# Patient Record
Sex: FEMALE | Race: WHITE | Employment: FULL TIME | URBAN - METROPOLITAN AREA
[De-identification: names, ages, dates, MRNs, and addresses within clinical notes are randomized per-mention and may not be internally consistent; named-entity substitution may affect disease eponyms.]

---

## 2021-12-01 DIAGNOSIS — M79.671 RIGHT FOOT PAIN: Primary | ICD-10-CM

## 2021-12-02 ENCOUNTER — OFFICE VISIT (OUTPATIENT)
Dept: ORTHOPEDIC SURGERY | Age: 46
End: 2021-12-02
Payer: COMMERCIAL

## 2021-12-02 VITALS — WEIGHT: 250 LBS | HEIGHT: 65 IN | BODY MASS INDEX: 41.65 KG/M2 | RESPIRATION RATE: 16 BRPM

## 2021-12-02 DIAGNOSIS — M19.079 ARTHRITIS OF MIDFOOT: ICD-10-CM

## 2021-12-02 DIAGNOSIS — M76.821 POSTERIOR TIBIAL TENDINITIS OF RIGHT LOWER EXTREMITY: Primary | ICD-10-CM

## 2021-12-02 DIAGNOSIS — M19.079 ARTHRITIS OF SUBTALAR JOINT: ICD-10-CM

## 2021-12-02 DIAGNOSIS — M24.573 EQUINUS CONTRACTURE OF ANKLE: ICD-10-CM

## 2021-12-02 DIAGNOSIS — M72.2 PLANTAR FASCIITIS: ICD-10-CM

## 2021-12-02 PROCEDURE — 99204 OFFICE O/P NEW MOD 45 MIN: CPT | Performed by: ORTHOPAEDIC SURGERY

## 2021-12-02 RX ORDER — NAPROXEN 500 MG/1
500 TABLET ORAL 2 TIMES DAILY PRN
Qty: 60 TABLET | Refills: 0 | Status: ON HOLD
Start: 2021-12-02 | End: 2022-03-18 | Stop reason: HOSPADM

## 2021-12-02 NOTE — PROGRESS NOTES
[facility] [address] AND SPORTS MEDICINE  [place] [address]  Dept: [phone]    Ambulatory Orthopedic Consult      CHIEF COMPLAINT:    Chief Complaint   Patient presents with    Foot Pain     Right       HISTORY OF PRESENT ILLNESS:      The patient is a 42 y.o. female who is being seen for evaluation of the above, which began around 7/2/21 atraumatically. At today's visit, she is using no brace/assistive device. History is obtained today from:   [x]  the patient     [x]  EMR     []  one family member/friend    []  multiple family members/friends    []  other:      She reports that she has previously seen a podiatrist for this problem Lissette Bowers). She localizes her pain to her right plantar heel and medial hindfoot. REVIEW OF SYSTEMS:  Musculoskeletal: See HPI for pertinent positives     Past Medical History:    She  has no past medical history on file. Past Surgical History:    She  has no past surgical history on file. Current Medications:   No current outpatient medications on file. Allergies:    Patient has no known allergies. Family History:  family history is not on file. Social History:   Social History     Occupational History    Not on file   Tobacco Use    Smoking status: Current Every Day Smoker    Smokeless tobacco: Never Used   Substance and Sexual Activity    Alcohol use: Not on file    Drug use: Not on file    Sexual activity: Not on file     Customer service full-time    OBJECTIVE:  Resp 16   Ht 5' 5\" (1.651 m)   Wt 250 lb (113.4 kg)   BMI 41.60 kg/m²    Psych: awake, alert  Cardio:  well perfused extremities, no cyanosis  Resp:  normal respiratory effort  Musculoskeletal:    RLE:  Vascular: Limb well perfused, compartments soft/compressible. Skin: No erythema/ulcers. Intact.    Neurovascular Status:  Grossly neurovascularly intact throughout   Tenderness to Palpation: Plantar heel, posterior tibial tendon greater than sinus Tarsi  -Pain with resisted inversion  -Pes planus, equinus      LLE:  Vascular: Limb well perfused, compartments soft/compressible. Skin: No erythema/ulcers. Intact. Neurovascular Status:  Grossly neurovascularly intact throughout   Tenderness to Palpation:   -Pes planus, equinus      RADIOLOGY:   12/2/2021 FINDINGS:  Three weightbearing views (AP, Mortise, and Lateral) of the right ankle and three weightbearing views (AP, Oblique, Lateral) of the right foot were obtained in the office today and reviewed, revealing no acute fracture, dislocation, or radioopaque foreign body/tumor. The ankle mortise is maintained with no widening of the clear spaces. Meary's angle is apex plantar, with widened talocalcaneal angle and talar head uncoverage. Degenerative changes diffusely throughout the midfoot, as well as at the subtalar joint, with joint space narrowing, sclerosis, and osteophytes. IMPRESSION: No acute fracture/dislocation. Pes planus. Electronically signed by Marcelino Ling MD    Relevant previous imaging reviewed, both imaging and report(s) as below:    No results found. ASSESSMENT AND PLAN:  Body mass index is 41.6 kg/m². She has right foot pain, secondary to posterior tibial tendinitis with pes planus and an equinus contracture, as well as a component of plantar fasciitis, along with degenerative changes of the subtalar joint and diffusely throughout the midfoot. Notably, she has the past medical history as above. She has a history of fibromyalgia, and tobacco use (reports she smokes 1/4 pack of cigarettes per day). We had a discussion today about the likely diagnosis and its natural history, physical exam and imaging findings, as well as various treatment options in detail. Surgically, we discussed a possible future flatfoot reconstruction, depending on the patient's clinical course.  At today's visit, we did decide to proceed with conservative management. Prior to being seen here today, she did receive an injection of her plantar heel (she reports that this helped for 2 days), and was in a cam boot, and doing physical therapy. She reports that she has not been able to work for the past 2 to 3 months, due to the pain. We did discuss her tobacco use in the context of a possible future surgery, I recommended tobacco cessation, and verbal understanding was expressed. Orders/referrals were placed as below at today's visit. The patient was provided information on an over the counter flatfoot style orthotic, including how to obtain it. I referred the patient to physical therapy for my flatfoot protocol as well as for her plantar fasciitis. At today's visit, she was ordered oral NSAIDs as below to be used as needed, and we discussed the appropriate risks. The patient was provided teaching material on this today, and will avoid using multiple NSAIDs at the same time. The patient was also provided a night splint, which she will use at night when sleeping for the next 12 weeks. All questions were answered and the above plan was agreed upon. The patient will return to clinic in 3 months PRN without x-rays. At her next visit, depending how she is doing, may consider a right subtalar joint injection and/or an [address], as well as topical anesthetics.             At the patient's next visit, depending on how the patient is doing and/or new imaging/labs results, we may consider the following options:    []  Lace up ankle     []  CAM boot         []  removable wrist brace     []  PT:        []  Wean out immobilization         []  Adv activity      []  Footmind        []  Spenco       []  Custom Orthotic:               []  AZ brace                    []  Rocker Bottom      []  Night splint    []  Heel cups        []  Strap        []  Toe gizmos    []  Topl        []  NSAIDs         []  Mary        []  Ref:         []  Stress Xray    []  CT []  MRI  []  Inj:          []  Consider OR      []  Pick OR date    No follow-ups on file. No orders of the defined types were placed in this encounter. Orders Placed This Encounter   Procedures    Ambulatory referral to Physical Therapy     Referral Priority:   Routine     Referral Type:   Eval and Treat     Referral Reason:   Specialty Services Required     Requested Specialty:   Physical Therapy     Number of Visits Requested:   1    Ambulatory referral to Physical Therapy     Referral Priority:   Routine     Referral Type:   Eval and Treat     Referral Reason:   Specialty Services Required     Requested Specialty:   Physical Therapy     Number of Visits Requested:   1         Yeison Emmanuel MD  Orthopedic Surgery        Please excuse any typos/errors, as this note was created with the assistance of voice recognition software. While intending to generate a document that actually reflects the content of the visit, the document can still have some errors including those of syntax and sound-a-like substitutions which may escape proof reading. In such instances, actual meaning can be extrapolated by context.

## 2021-12-02 NOTE — LETTER
[address] [place] [address] and Sports Medicine  [address] [address]  Phone: [phone]  Fax: [phone]    Michael Montiel MD    December 2, 2021     CAYETANO Ferrer - CNP  [facility] [address] [address]    Patient: Aylin Bueno   MR Number: [id number]   YOB: 1975   Date of Visit: 12/2/2021       Dear Araseli Valentin: Thank you for referring Aylin Bueno to me for evaluation/treatment. Below are the relevant portions of my assessment and plan of care. She has right foot pain, secondary to posterior tibial tendinitis with pes planus and an equinus contracture, as well as a component of plantar fasciitis, along with degenerative changes of the subtalar joint and diffusely throughout the midfoot. Notably, she has the past medical history as above. She has a history of fibromyalgia, and tobacco use (reports she smokes 1/4 pack of cigarettes per day). We had a discussion today about the likely diagnosis and its natural history, physical exam and imaging findings, as well as various treatment options in detail. Surgically, we discussed a possible future flatfoot reconstruction, depending on the patient's clinical course. At today's visit, we did decide to proceed with conservative management. Prior to being seen here today, she did receive an injection of her plantar heel (she reports that this helped for 2 days), and was in a cam boot, and doing physical therapy. She reports that she has not been able to work for the past 2 to 3 months, due to the pain. We did discuss her tobacco use in the context of a possible future surgery, I recommended tobacco cessation, and verbal understanding was expressed. Orders/referrals were placed as below at today's visit. The patient was provided information on an over the counter flatfoot style orthotic, including how to obtain it.  I referred the patient to

## 2021-12-13 ENCOUNTER — HOSPITAL ENCOUNTER (OUTPATIENT)
Dept: PHYSICAL THERAPY | Facility: CLINIC | Age: 46
Setting detail: THERAPIES SERIES
Discharge: HOME | End: 2021-12-13
Payer: COMMERCIAL

## 2021-12-13 PROCEDURE — 97161 PT EVAL LOW COMPLEX 20 MIN: CPT

## 2021-12-13 PROCEDURE — 97110 THERAPEUTIC EXERCISES: CPT

## 2021-12-13 NOTE — CONSULTS
[] [facility]) - [facility]  [address]  [phone]  F: [phone] [] [address]  Kl\[facility] [place] [facility]\"" [address]   [address]  P: [phone]  F: [phone] [] [address] &  Therapy  [address] [facility]  P: [phone]  F: [phone] [x] [address]  P: [phone]  F: [phone] [] [address]  [facility]   [address]   [place]: [phone]  F: [phone]      Physical Therapy Lower Extremity Evaluation    Date:  2021  Patient: Sondra Sanchez  : 1975  MRN: [id number]  Physician: Jm Brennan MD   Insurance: Alveta Fairchild  Medical Diagnosis:   E22.528 (ICD-10-CM) - Posterior tibial tendinitis of right lower [id number] (ICD-10-CM) - Equinus contracture of ankle  M19.079 (ICD-10-CM) - Arthritis of midfoot  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 571   Onset date: 2021    Next 's appt. : 3/3/2022    Subjective:     CC: Right foot pain    HPI: The patient states that she has been experiencing right foot pain since 2021. 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 The patient describes her pain as sharp and is located over the medial calcaneal tubercle. Her pain will occasionally travel proximally to the knee along the gastroc musculature. She currently wears hoka shoes with motion controled shoe inserts (medial arch support). She is wearing night splints that seem to slightly help but makes her foot stiff in the morning. The patient had been previously been completing physical therapy at [facility] that consisted of hip strengthening, gastroc strengthening and STM.  He last physical therapy attempt had not helped her pain but had actually made is worse. The patient is anticipating a return to [facility] date on January 2nd. PMHx: [x] Unremarkable [] Diabetes [] HTN  [] Pacemaker   [] MI/Heart Problems [] Cancer [] Arthritis [] Other:              [] Refer to full medical chart  In EPIC        Comorbidities:   [] Obesity [] Dialysis  [] N/A   [] Asthma/COPD [] Dementia [] Other:   [] Stroke [] Sleep apnea [] Other:   [] Vascular disease [] Rheumatic disease [] Other:     Tests: [x] X-Ray: 12/1/2021 [] MRI:  [] Other:  Impression    No acute fracture/dislocation. Pes planus.       Electronically signed by Kimberly Singh MD       Medications: [x] Refer to full medical record [] None [] Other:  Allergies:      [x] Refer to full medical record [] None [] Other:    Function:    Patient lives with: In what type of home []  One story   [] Two story   [] Split level   Number of stairs to enter 2   With handrail on the []  Right to enter   [] Left to enter   Bathroom has a []  Tub only  [] Tub/shower combo   [] Walk in shower    []  Grab bars   Washing machine is on []  Main level   [] Second level   [] Basement   Employer [employer]    Job Status []  Normal duty   [] Light duty   [x] Off due to condition    []  Retired   [] Not employed   [] Disability  [] Other:  []  Return to work:    Work activities Customer Service        ADL/IADL Previous level of function Current level of function Modifications made  Who currently assists the patient with task   Bathing  [x] Independent  [] Assist [x] Independent  [] Assist     Dress/grooming [x] Independent  [] Assist [x] Independent  [] Assist     Transfer/mobility [x] Independent  [] Assist [x] Independent  [] Assist     Feeding [x] Independent  [] Assist [x] Independent  [] Assist     Toileting [x] Independent  [] Assist [x] Independent  [] Assist     Driving [x] Independent  [] Assist [x] Independent  [] Assist     Housekeeping [x] Independent  [] Assist [x] Independent  [] Assist Hurts if on her feet for too long     Grocery shop/meal prep [x] Independent  [] Assist [x] Independent  [] Assist       Gait Prior level of function Current level of function    [x] Independent  [] Assist [x] Independent  [] Assist   Device: [] Independent [] Independent    [] Straight Cane [] Quad cane [] Straight Cane [] Quad cane    [] Standard walker [] Rolling walker   [] 4 wheeled walker [] Standard walker [] Rolling walker   [] 4 wheeled walker    [] Wheelchair [] Wheelchair     Pain:  [x] Yes  [] No Location: Right foot Pain Rating: (0-10 scale) 4/10; 10/10 standing in the kitchen   Pain altered Tx:  [] Yes  [] No  Action:    Symptoms:  [] Improving [] Worsening [x] Same  Better:  [] AM    [] PM    [x] Sit    [] Rise/Sit    []Stand    [] Walk    [] Lying    [] Other:  Worse: [] AM    [] PM    [] Sit    [] Rise/Sit    [x]Stand    [x] Walk    [] Lying    [] Bend                      [] Valsalva    [] Other:  Sleep: [] OK    [x] Disturbed: wakes her up around 2 - 3 o'clock in the morning     Objective:     OBSERVATION No Deficit Deficit Not Tested Comments   Posture       Lordosis [] [] [x]    Lateral Shift [] [] [x]    Scoliosis [] [] [x]    Iliac Crest [] [] [x]    PSIS [] [] [x]    ASIS [] [] [x]    Genu Valgus [] [] []    Genu Varus [] [] []    Genu Recurvatum [] [] []    Pronation [] [x] [] Over pronation with standing; positive navicular drop    Supination [] [] [x]    Leg Length Discrp [] [] [x]    Slumped Sitting [] [] [x]    Palpation [] [x] [] TTP over the posterior tibialis    Sensation [x] [] []    Edema [x] [] []    Neurological [x] [] []    Patellar Mobility [] [] [x]    Patellar Orientation [] [] [x] Rearfoot varus bilaterally    Joint mobility [] [x] [] Mid foot hypermobility    Gait [] [x] [] Analysis: mild right antalgic gait, stiff knee gait             ROM  ° A/P STRENGTH TESTS (+/-) Left Right Not Tested    Left Right Left Right    []   Hip     Anterior Drawer    [] treatments)  1. ? Pain: Patient will decrease right foot pain to < or = to 2/10 with weight bearing activity   2. ? ROM: Patient will increase right ankle ROM to > or = to 10 degrees. 3. ? Strength: Patient will demonstrate good muscle coordination during toe yoga exercises. 4. ? Function: Patient will be able to tolerate > 1 hour of standing. 5. Patient to be independent with home exercise program as demonstrated by performance with correct form without cues. 6. Demonstrate Knowledge of fall prevention  LTG: (to be met in 16 treatments)  1. Patient will increase bilateral hip strength to > or = to 4+/5.  2. Patient will be able to work 1 work shift without increased right foot pain. 3. Patient will decrease LEFS score to < or = to 25% impairment. Patient goals: Decrease pain and walk better     Rehab Potential:  [] Good  [x] Fair  [] Poor  Fair due to poor response to past therapy      Suggested Professional Referral:  [x] No  [] Yes:  Barriers to Goal Achievement:  [x] No  [] Yes:  Domestic Concerns:  [x] No  [] Yes:    Pt. Education:  [x] Plans/Goals, Risks/Benefits discussed  [x] Home exercise program    Method of Education: [x] Verbal  [x] Demo  [x] Written    [facility]  URL: [email or web address]  Date: 12/13/2021  Prepared by: Luis Fernando Mad    Exercises  Seated Great Toe Extension - 2 x daily - 7 x weekly - 3 sets - 10 reps  Seated Lesser Toes Extension - 2 x daily - 7 x weekly - 3 sets - 10 reps  Toe Spreading - 1 x daily - 7 x weekly - 3 sets - 10 reps  Arch Lifting - 1 x daily - 7 x weekly - 3 sets - 10 reps      Comprehension of Education:  [x] Verbalizes understanding. [x] Demonstrates understanding. [] Needs Review. [x] Demonstrates/verbalizes understanding of HEP/Ed previously given.     Treatment Plan:  [x] Therapeutic Exercise   44082  [] Iontophoresis: 4 mg/mL Dexamethasone Sodium Phosphate  mAmin  82874   [x] Therapeutic Activity  27509 [x] Vasopneumatic cold with compression  [id number]    [] Gait Training   [phone] [] Ultrasound   [id number]   [x] Neuromuscular Re-education  [phone] [] Electrical Stimulation Unattended  45185   [x] Manual Therapy  54125 [] Electrical Stimulation Attended  [id number]   [x] Instruction in HEP  [] Lumbar/Cervical Traction  [id number]   [] Aquatic Therapy   [id number] [] Cold/hotpack    [] Massage   42511      [] Dry Needling, 1 or 2 muscles  80706   [] Biofeedback, first 15 minutes   92527  [] Biofeedback, additional 15 minutes   72529 [] Dry Needling, 3 or more muscles  27468     []  Medication allergies reviewed for use of    Dexamethasone Sodium Phosphate 4mg/ml     with iontophoresis treatments. Pt is not allergic. Frequency:  2 x/week for 16 visits        Todays Treatment:  Modalities:   Precautions:  Exercises:  Exercise Reps/ Time Weight/ Level Comments   Seated Great Toe Extension      Seated Lesser Toes Extension        Toe Spreading      Arch Lifting      Patient education 5 min  Educated the patient on the anatomy and physiology associated with their dysfunction, reviewed plan of care, HEP and answered all the patient's questions. HEP:   Seated Great Toe Extension - 2 x daily - 7 x weekly - 3 sets - 10 reps  Seated Lesser Toes Extension - 2 x daily - 7 x weekly - 3 sets - 10 reps  Toe Spreading - 1 x daily - 7 x weekly - 3 sets - 10 reps  Arch Lifting - 1 x daily - 7 x weekly - 3 sets - 10 reps    Other:    Specific Instructions for next treatment: Foot intrinsic training (Great toe extension, Lesser toe extension, Toe splaying, foot doming), Posterior tibialis resisted TB exercises, TC joint mobilizations, hip strengthening. Progress to standing / WB foot intrinsic exercises.         Evaluation Complexity:  History (Personal factors, comorbidities) [] 0 [] 1-2 [x] 3+   Exam (limitations, restrictions) [] 1-2 [] 3 [x] 4+   Clinical presentation (progression) [x] Stable [] Evolving  [] Unstable   Decision Making [x] Low [] Moderate [] High    [x] Low Complexity [] Moderate Complexity [] High Complexity       Treatment Charges: Mins Units   [] Evaluation       [x]  Low       []  Moderate       []  High 35 1   []  Modalities     [x]  Ther Exercise 15 1   []  Manual Therapy     []  Ther Activities     []  Aquatics     []  Vasocompression     []  Other       TOTAL TREATMENT TIME: 50    Time in: 4:00 pm    Time Out: 4:50 pm    Electronically signed by: Davis Becker PT        Physician Signature:________________________________Date:__________________  By signing above or cosigning this note, I have reviewed this plan of care and certify a need for medically necessary rehabilitation services.      *PLEASE SIGN ABOVE AND FAX BACK ALL PAGES*

## 2021-12-17 ENCOUNTER — HOSPITAL ENCOUNTER (OUTPATIENT)
Dept: PHYSICAL THERAPY | Facility: CLINIC | Age: 46
Setting detail: THERAPIES SERIES
Discharge: HOME | End: 2021-12-17
Payer: COMMERCIAL

## 2021-12-17 PROCEDURE — 97016 VASOPNEUMATIC DEVICE THERAPY: CPT

## 2021-12-17 PROCEDURE — 97110 THERAPEUTIC EXERCISES: CPT

## 2021-12-17 PROCEDURE — 97140 MANUAL THERAPY 1/> REGIONS: CPT

## 2021-12-17 NOTE — FLOWSHEET NOTE
[] [facility]) - [facility] &  Therapy  [address]  [phone]  F: [phone] [] [address]  [employer] [employer] [address]   [address]  P: [phone]  F: [phone] [] [address] &  Therapy  [address]  P: [phone]  F: [phone] [x] [address]  P: [phone]  F: [phone] [] [address]  [facility]   [address]   [place]: [phone]  F: [phone]      Physical Therapy Daily Treatment Note    Date:  2021  Patient Name:  Aylin Bueno    :  1975  MRN: [id number]  Physician: Michael Montiel MD    Insurance: [facility]  Medical Diagnosis:   L19.435 (ICD-10-CM) - Posterior tibial tendinitis of right lower borpxksegW92.573 (ICD-10-CM) - Equinus contracture of ankle  M19.079 (ICD-10-CM) - Arthritis of midfoot  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 571   Onset date: 2021               Next 's appt. : 3/3/2022  Visit# / total visits:      Cancels/No Shows: 0/2    Subjective:    Pain:  [x] Yes  [] No Location: Right ankle / foot Pain Rating: (0-10 scale) 5/10  Pain altered Tx:  [] No  [] Yes  Action:  Comments: Patient states that she has increased soreness this date after a busy weekend of being on her feet / activity. The soreness is located over the posterior calf region and directly over the medial calcaneal tubercle. The patient states that she feels that the orthotics have not helped and actually put more pressure on the heel.       Objective:  Modalities:   Precautions:  Exercises:  Exercise Reps/ Time Weight/ Level Comments               Seated       Seated Great Toe Extension 2 x 10       Seated Lesser Toes Extension    2 x 10       Toe Spreading 2 x 10       Arch Lifting 2 x 10       Ankle plantar flexion 2 x 10     Ankle dorsiflexion 2 x 10     Ankle inversion 2 x 10     Ankle eversion 2 x 10  2 sets out and up  2 sets out and down             HEP:   Seated Great Toe Extension - 2 x daily - 7 x weekly - 3 sets - 10 reps  Seated Lesser Toes Extension - 2 x daily - 7 x weekly - 3 sets - 10 reps  Toe Spreading - 1 x daily - 7 x weekly - 3 sets - 10 reps  Arch Lifting - 1 x daily - 7 x weekly - 3 sets - 10 reps  Long Sitting Ankle Plantar Flexion with Resistance - 1 x daily - 7 x weekly - 3 sets - 10 reps  Long Sitting Ankle Dorsiflexion with Anchored Resistance - 1 x daily - 7 x weekly - 3 sets - 10 reps  Ankle Inversion with Resistance - 1 x daily - 7 x weekly - 3 sets - 10 reps  Ankle Eversion with Resistance - 1 x daily - 7 x weekly - 3 sets - 10 reps        Treatment Charges: Mins Units   []  Modalities     [x]  Ther Exercise 20 2   [x]  Manual Therapy 20 1   []  Ther Activities     []  Aquatics     [x]  Vasocompression 15 1   []  Other     Total Treatment time 55 4       Assessment: [x] Progressing toward goals. Initiated treatment with STM over the right gastroc and posterior tibialis. Provided STM via nestor  with greatest restrictions noted over the achillis tendon and posterior tibialis muscle belly. Completed foot intrinsic muscle exercises with good technique but fatigued through the second set. Initiated 4 way ankle exercises with emphasis on muscular control through the entire ROM. Will progress next session as tolerated with stabilization exercises. [] No change. [] Other:  [x] Patient would continue to benefit from skilled physical therapy services in order to: The patient is a 41 y/o female that presents with right foot pain. The patient presents with impairments of increased pain, decreased foot control, decreased DF ROM and poor tolerance to work related activities.  The patient signs and symptoms may be consistent with plantar fasciitis and posterior tibials insufficiency. The patient will benefit from therapy services to address the above impairments  to return to previous level of     STG: (to be met in 8 treatments)  1. ? Pain: Patient will decrease right foot pain to < or = to 2/10 with weight bearing activity   2. ? ROM: Patient will increase right ankle ROM to > or = to 10 degrees. 3. ? Strength: Patient will demonstrate good muscle coordination during toe yoga exercises. 4. ? Function: Patient will be able to tolerate > 1 hour of standing. 5. Patient to be independent with home exercise program as demonstrated by performance with correct form without cues. 6. Demonstrate Knowledge of fall prevention  LTG: (to be met in 16 treatments)  1. Patient will increase bilateral hip strength to > or = to 4+/5.  2. Patient will be able to work 1 work shift without increased right foot pain. 3. Patient will decrease LEFS score to < or = to 25% impairment. Pt. Education:  [x] Yes  [] No  [x] Reviewed Prior HEP/Ed  Method of Education: [x] Verbal  [] Demo  [x] Written    Access Code: [email or web address]  URL: [email or web address]  Date: 12/17/2021  Prepared by: Win Johns    Exercises  Long Sitting Ankle Plantar Flexion with Resistance - 1 x daily - 7 x weekly - 3 sets - 10 reps  Long Sitting Ankle Dorsiflexion with Anchored Resistance - 1 x daily - 7 x weekly - 3 sets - 10 reps  Ankle Inversion with Resistance - 1 x daily - 7 x weekly - 3 sets - 10 reps  Ankle Eversion with Resistance - 1 x daily - 7 x weekly - 3 sets - 10 reps      Comprehension of Education:  [x] Verbalizes understanding. [x] Demonstrates understanding. [] Needs review. [x] Demonstrates/verbalizes HEP/Ed previously given. Plan: [x] Continue current frequency toward long and short term goals.     [x] Specific Instructions for subsequent treatments: Specific Instructions for next treatment: Foot intrinsic training (Great toe extension, Lesser toe extension, Toe splaying, foot doming), Posterior tibialis resisted TB exercises, TC joint mobilizations, hip strengthening. Progress to standing / WB foot intrinsic exercises. Time In: 9:30 am            Time Out: 10:32 am    Electronically signed by:   Akila Monique PT

## 2021-12-21 ENCOUNTER — HOSPITAL ENCOUNTER (OUTPATIENT)
Dept: PHYSICAL THERAPY | Facility: CLINIC | Age: 46
Setting detail: THERAPIES SERIES
Discharge: HOME | End: 2021-12-21
Payer: COMMERCIAL

## 2021-12-21 PROCEDURE — 97110 THERAPEUTIC EXERCISES: CPT

## 2021-12-21 PROCEDURE — 97016 VASOPNEUMATIC DEVICE THERAPY: CPT

## 2021-12-21 PROCEDURE — 97140 MANUAL THERAPY 1/> REGIONS: CPT

## 2021-12-21 NOTE — FLOWSHEET NOTE
[] [phone]  - [facility] TWELVEST[facility] &  Therapy  [address]  [phone]  F: [phone] [] [address]  [facility]   [address]  P: [phone]  F: [phone] [] [address] &  Therapy  [address]  P: [phone]  F: [phone] [x] [address]  P: [phone]  F: [phone] [] [address]  [facility]   [address]   [place]: [phone]  F: [phone]      Physical Therapy Daily Treatment Note    Date:  2021  Patient Name:  Rosanna Sahu    :  1975  MRN: [id number]  Physician: Pamela Teixeira MD    Insurance: [facility] [employer]  Medical Diagnosis:   S16.616 (ICD-10-CM) - Posterior tibial tendinitis of right lower endjxqfveL43.573 (ICD-10-CM) - Equinus contracture of ankle  M19.079 (ICD-10-CM) - Arthritis of midfoot  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 571   Onset date: 2021               Next 's appt. : 3/3/2022  Visit# / total visits: 3/16     Cancels/No Shows: 0/2    Subjective:    Pain:  [x] Yes  [] No Location: Right ankle / foot Pain Rating: (0-10 scale) 7/10  Pain altered Tx:  [] No  [] Yes  Action:  Comments: Patient states that she is experiencing increased pain this date. Reports that the most of her pain is in the top of the foot, states she was wearing her night brace which made her pain worse.       Objective:  Modalities:   Precautions:  Exercises:  Exercise Reps/ Time Weight/ Level Comments               Seated       Seated Great Toe Extension 2 x 10       Seated Lesser Toes Extension    2 x 10       Toe Spreading 2 x 10       Arch Lifting 2 x 10       Ankle plantar flexion 2 x 10     Ankle dorsiflexion 2 x 10     Ankle inversion 2 x 10     Ankle eversion 2 x 10  2 sets out and up  2 sets out and down    Bosu circles  2 x 10           Standing      Tandem stance on foam pad 3 x 30\"              HEP:   Seated Great Toe Extension - 2 x daily - 7 x weekly - 3 sets - 10 reps  Seated Lesser Toes Extension - 2 x daily - 7 x weekly - 3 sets - 10 reps  Toe Spreading - 1 x daily - 7 x weekly - 3 sets - 10 reps  Arch Lifting - 1 x daily - 7 x weekly - 3 sets - 10 reps  Long Sitting Ankle Plantar Flexion with Resistance - 1 x daily - 7 x weekly - 3 sets - 10 reps  Long Sitting Ankle Dorsiflexion with Anchored Resistance - 1 x daily - 7 x weekly - 3 sets - 10 reps  Ankle Inversion with Resistance - 1 x daily - 7 x weekly - 3 sets - 10 reps  Ankle Eversion with Resistance - 1 x daily - 7 x weekly - 3 sets - 10 reps        Treatment Charges: Mins Units   []  Modalities     [x]  Ther Exercise 25 2   [x]  Manual Therapy 20 1   []  Ther Activities     []  Aquatics     [x]  Vasocompression 10 1   []  Other     Total Treatment time 55 4       Assessment: [x] Progressing toward goals. Initiated treatment with STM over the right gastroc and posterior tibialis. Provided STM via nestor  with greatest restrictions noted over the achillis tendon and posterior tibialis muscle belly. Completed 4 way ankle exercises with emphasis on muscular control through the entire ROM. Initiated standing ankle / foot stabilization with tandem stance on foam pad without shoe support. Will consider taping to provide additional stability next treatment with exercise progressions. [] No change. [] Other:  [x] Patient would continue to benefit from skilled physical therapy services in order to: The patient is a 41 y/o female that presents with right foot pain. The patient presents with impairments of increased pain, decreased foot control, decreased DF ROM and poor tolerance to work related activities. The patient signs and symptoms may be consistent with plantar fasciitis and posterior tibials insufficiency.  The patient will benefit from therapy services to address the above impairments  to return to previous level of     STG: (to be met in 8 treatments)  1. ? Pain: Patient will decrease right foot pain to < or = to 2/10 with weight bearing activity   2. ? ROM: Patient will increase right ankle ROM to > or = to 10 degrees. 3. ? Strength: Patient will demonstrate good muscle coordination during toe yoga exercises. 4. ? Function: Patient will be able to tolerate > 1 hour of standing. 5. Patient to be independent with home exercise program as demonstrated by performance with correct form without cues. 6. Demonstrate Knowledge of fall prevention  LTG: (to be met in 16 treatments)  1. Patient will increase bilateral hip strength to > or = to 4+/5.  2. Patient will be able to work 1 work shift without increased right foot pain. 3. Patient will decrease LEFS score to < or = to 25% impairment. Pt. Education:  [x] Yes  [] No  [x] Reviewed Prior HEP/Ed  Method of Education: [x] Verbal  [] Demo  [x] Written    Access Code: [email or web address]  URL: [email or web address]  Date: 12/17/2021  Prepared by: Chino Serum    Exercises  Long Sitting Ankle Plantar Flexion with Resistance - 1 x daily - 7 x weekly - 3 sets - 10 reps  Long Sitting Ankle Dorsiflexion with Anchored Resistance - 1 x daily - 7 x weekly - 3 sets - 10 reps  Ankle Inversion with Resistance - 1 x daily - 7 x weekly - 3 sets - 10 reps  Ankle Eversion with Resistance - 1 x daily - 7 x weekly - 3 sets - 10 reps      Comprehension of Education:  [x] Verbalizes understanding. [x] Demonstrates understanding. [] Needs review. [x] Demonstrates/verbalizes HEP/Ed previously given. Plan: [x] Continue current frequency toward long and short term goals.     [x] Specific Instructions for subsequent treatments: Specific Instructions for next treatment: Foot intrinsic training (Great toe extension, Lesser toe extension, Toe splaying, foot doming), Posterior tibialis resisted TB exercises, TC joint mobilizations, hip strengthening. Progress to standing / WB foot intrinsic exercises. Time In: 2:00 pm            Time Out: 3:10 pm    Electronically signed by:   Misbah Lora PT

## 2021-12-23 ENCOUNTER — HOSPITAL ENCOUNTER (OUTPATIENT)
Dept: PHYSICAL THERAPY | Facility: CLINIC | Age: 46
Setting detail: THERAPIES SERIES
Discharge: HOME | End: 2021-12-23
Payer: COMMERCIAL

## 2021-12-23 PROCEDURE — 97110 THERAPEUTIC EXERCISES: CPT

## 2021-12-23 PROCEDURE — 97140 MANUAL THERAPY 1/> REGIONS: CPT

## 2021-12-23 NOTE — FLOWSHEET NOTE
Standing      Tandem stance on foam pad 3 x 30\"              HEP:   Seated Great Toe Extension - 2 x daily - 7 x weekly - 3 sets - 10 reps  Seated Lesser Toes Extension - 2 x daily - 7 x weekly - 3 sets - 10 reps  Toe Spreading - 1 x daily - 7 x weekly - 3 sets - 10 reps  Arch Lifting - 1 x daily - 7 x weekly - 3 sets - 10 reps  Long Sitting Ankle Plantar Flexion with Resistance - 1 x daily - 7 x weekly - 3 sets - 10 reps  Long Sitting Ankle Dorsiflexion with Anchored Resistance - 1 x daily - 7 x weekly - 3 sets - 10 reps  Ankle Inversion with Resistance - 1 x daily - 7 x weekly - 3 sets - 10 reps  Ankle Eversion with Resistance - 1 x daily - 7 x weekly - 3 sets - 10 reps    Other:   The patient was educated in the benefits of dry needling treatment and potential risks, including but not limited to drowsiness, vasovagal responses, pain / soreness, minor bruising or bleeding and pneumothorax. Following patient education of the risks and benefits, the patient has supplied both verbal and written consent of treatment. Prior to needle placement the patients chart was thoroughly reviewed examining for potential precautions and contraindications. After reviewing the patients chart and obtaining written consent to treat the patient was considered a good candidate for dry needling services. Dry needling was performed in conjunction with Manual Therapy to promote soft tissue extensibility, improve tissue quality, improve ROM, normalize joint biomechanics and reduce pain symptoms. No charge submitted for the time the needles were inserted.    Dry Needle Dosage    Body region  (Homeostatic region, Symptomatic region, Paraspinal region)  Size and Number of Needles Technique  (Static, Tenting, Pistoning)   Homeostatic: Calf, Fibular   Symptomatic: Medial / lateral gastroc head  Paraspinal: Size: 3\"  Number: 6 Mild tenting in the calf homeostatic point and medial gastroc head       Treatment Charges: Mins Units to 25% impairment. Pt. Education:  [x] Yes  [] No  [x] Reviewed Prior HEP/Ed  Method of Education: [x] Verbal  [] Demo  [x] Written    Access Code: [email or web address]  URL: [email or web address]  Date: 12/17/2021  Prepared by: Georgie Cifuentes    Exercises  Long Sitting Ankle Plantar Flexion with Resistance - 1 x daily - 7 x weekly - 3 sets - 10 reps  Long Sitting Ankle Dorsiflexion with Anchored Resistance - 1 x daily - 7 x weekly - 3 sets - 10 reps  Ankle Inversion with Resistance - 1 x daily - 7 x weekly - 3 sets - 10 reps  Ankle Eversion with Resistance - 1 x daily - 7 x weekly - 3 sets - 10 reps      Comprehension of Education:  [x] Verbalizes understanding. [x] Demonstrates understanding. [] Needs review. [x] Demonstrates/verbalizes HEP/Ed previously given. Plan: [x] Continue current frequency toward long and short term goals. [x] Specific Instructions for subsequent treatments: Specific Instructions for next treatment: Foot intrinsic training (Great toe extension, Lesser toe extension, Toe splaying, foot doming), Posterior tibialis resisted TB exercises, TC joint mobilizations, hip strengthening. Progress to standing / WB foot intrinsic exercises. Time In: 10:30 pm            Time Out: 11:40 pm    Electronically signed by:   Georgie Cifuentes, PT

## 2021-12-28 ENCOUNTER — OFFICE VISIT (OUTPATIENT)
Dept: ORTHOPEDIC SURGERY | Age: 46
End: 2021-12-28
Payer: COMMERCIAL

## 2021-12-28 VITALS — WEIGHT: 250 LBS | HEIGHT: 65 IN | BODY MASS INDEX: 41.65 KG/M2 | RESPIRATION RATE: 12 BRPM

## 2021-12-28 DIAGNOSIS — M19.079 ARTHRITIS OF MIDFOOT: ICD-10-CM

## 2021-12-28 DIAGNOSIS — M24.573 EQUINUS CONTRACTURE OF ANKLE: ICD-10-CM

## 2021-12-28 DIAGNOSIS — M72.2 PLANTAR FASCIITIS: ICD-10-CM

## 2021-12-28 DIAGNOSIS — M19.079 ARTHRITIS OF SUBTALAR JOINT: ICD-10-CM

## 2021-12-28 DIAGNOSIS — M76.821 POSTERIOR TIBIAL TENDINITIS OF RIGHT LOWER EXTREMITY: Primary | ICD-10-CM

## 2021-12-28 PROCEDURE — 99212 OFFICE O/P EST SF 10 MIN: CPT | Performed by: ORTHOPAEDIC SURGERY

## 2021-12-28 PROCEDURE — 20605 DRAIN/INJ JOINT/BURSA W/O US: CPT | Performed by: ORTHOPAEDIC SURGERY

## 2021-12-28 RX ORDER — TRIAMCINOLONE ACETONIDE 40 MG/ML
40 INJECTION, SUSPENSION INTRA-ARTICULAR; INTRAMUSCULAR ONCE
Status: COMPLETED | OUTPATIENT
Start: 2021-12-28 | End: 2021-12-28

## 2021-12-28 RX ORDER — LIDOCAINE HYDROCHLORIDE 10 MG/ML
2 INJECTION, SOLUTION INFILTRATION; PERINEURAL ONCE
Status: COMPLETED | OUTPATIENT
Start: 2021-12-28 | End: 2021-12-28

## 2021-12-28 RX ADMIN — TRIAMCINOLONE ACETONIDE 40 MG: 40 INJECTION, SUSPENSION INTRA-ARTICULAR; INTRAMUSCULAR at 13:39

## 2021-12-28 RX ADMIN — LIDOCAINE HYDROCHLORIDE 2 ML: 10 INJECTION, SOLUTION INFILTRATION; PERINEURAL at 13:39

## 2021-12-28 NOTE — PROGRESS NOTES
 Drug use: Not on file    Sexual activity: Not on file     Customer service full-time    OBJECTIVE:  Resp 12   Ht 5' 5\" (1.651 m)   Wt 250 lb (113.4 kg)   BMI 41.60 kg/m²    Psych: awake, alert  Cardio:  well perfused extremities, no cyanosis  Resp:  normal respiratory effort  Musculoskeletal:    RLE:  Vascular: Limb well perfused, compartments soft/compressible. Skin: No erythema/ulcers. Intact. Neurovascular Status:  Grossly neurovascularly intact throughout   Tenderness to Palpation: Plantar heel, posterior tibial tendon greater than sinus Tarsi  -Pain with resisted inversion  -Pes planus, equinus      LLE:  Vascular: Limb well perfused, compartments soft/compressible. Skin: No erythema/ulcers. Intact. Neurovascular Status:  Grossly neurovascularly intact throughout   Tenderness to Palpation:   -Pes planus, equinus      RADIOLOGY:   12/28/2021 No new radiology images today. Prior images reviewed for reference. FINDINGS:  Three weightbearing views (AP, Mortise, and Lateral) of the right ankle and three weightbearing views (AP, Oblique, Lateral) of the right foot were obtained in the office today and reviewed, revealing no acute fracture, dislocation, or radioopaque foreign body/tumor. The ankle mortise is maintained with no widening of the clear spaces. Meary's angle is apex plantar, with widened talocalcaneal angle and talar head uncoverage. Degenerative changes diffusely throughout the midfoot, as well as at the subtalar joint, with joint space narrowing, sclerosis, and osteophytes. IMPRESSION: No acute fracture/dislocation. Pes planus. Electronically signed by Isael Herrera MD    Relevant previous imaging reviewed, both imaging and report(s) as below:    No results found. ASSESSMENT AND PLAN:  Body mass index is 41.6 kg/m².        She has right foot pain, secondary to posterior tibial tendinitis with pes planus and an equinus contracture, as well as a component of plantar fasciitis, along with degenerative changes of the subtalar joint and diffusely throughout the midfoot. Notably, she has the past medical history as above. She has a history of fibromyalgia, and tobacco use (reports she smokes 1/4 pack of cigarettes per day). We had a discussion today about the likely diagnosis and its natural history, physical exam and imaging findings, as well as various treatment options in detail. Surgically, we have discussed a possible future flatfoot reconstruction, depending on the patient's clinical course. We have decided to continue with conservative management. Prior to being seen here today, she did receive an injection of her plantar heel (she reports that this helped for 2 days), and was in a cam boot, and doing physical therapy. She reports that she has not been able to work for the past 2 to 3 months, due to the pain. We did discuss her tobacco use in the context of a possible future surgery, I recommended tobacco cessation, and verbal understanding was expressed. Orders/referrals were placed as below at today's visit. She will continue to use the over-the-counter flatfoot style orthotic, continue to use her night splint, and continue physical therapy for her posterior tibial tendinitis as well as for her plantar fasciitis. She may continue to take her oral NSAIDs as needed. We discussed an [place] brace, however she is not interested in pursuing this at this time.    -After discussing her treatment options, she wished to proceed with a right subtalar joint injection as below    All questions were answered and the above plan was agreed upon. The patient will return to clinic in the future as needed without x-rays. At her next visit, depending on how she is doing, we may consider an [place] brace, topical anesthetics and/or repeat right subtalar joint injection.           SUBTALAR INJECTION PROCEDURE NOTE: After discussing the risks/benefits/alternatives to injection, an informed consent was obtained. The right subtalar joint was verified as the correct location and allergies were reviewed. The skin overlying the injection site was cleaned with an alcohol swab followed by a local sterile prep. A 25 gauge needle was introduced into the above location under sterile conditions. A mixture of 40 mg of Kenalog and 2 mL of 1% Lidocaine without epinephrine was injected. The patient was noted to tolerate the procedure well without immediate complication. A dressing was applied and verbal instruction/education was provided. At the patient's next visit, depending on how the patient is doing and/or new imaging/labs results, we may consider the following options:    []  Lace up ankle     []  CAM boot         []  removable wrist brace     []  PT:        []  Wean out immobilization         []  Adv activity      []  Footmind        []  Spenco       []  Custom Orthotic:               []  AZ brace                    []  Rocker Bottom      []  Night splint    []  Heel cups        []  Strap        []  Toe gizmos    []  Topl        []  NSAIDs         []  Mary        []  Ref:         []  Stress Xray    []  CT        []  MRI  []  Inj:          []  Consider OR      []  Pick OR date    No follow-ups on file. No orders of the defined types were placed in this encounter. No orders of the defined types were placed in this encounter. Norris Kerr MD  Orthopedic Surgery        Please excuse any typos/errors, as this note was created with the assistance of voice recognition software. While intending to generate a document that actually reflects the content of the visit, the document can still have some errors including those of syntax and sound-a-like substitutions which may escape proof reading. In such instances, actual meaning can be extrapolated by context.

## 2021-12-29 ENCOUNTER — HOSPITAL ENCOUNTER (OUTPATIENT)
Dept: PHYSICAL THERAPY | Facility: CLINIC | Age: 46
Setting detail: THERAPIES SERIES
Discharge: HOME | End: 2021-12-29
Payer: COMMERCIAL

## 2021-12-29 PROCEDURE — 97140 MANUAL THERAPY 1/> REGIONS: CPT

## 2021-12-29 PROCEDURE — 97110 THERAPEUTIC EXERCISES: CPT

## 2021-12-29 NOTE — FLOWSHEET NOTE
[] [facility]) - [facility] &  Therapy  [address]  [phone]  F: [phone] [] [address]  [address]   [address]  P: [phone]  F: [phone] [] [address] &  Therapy  [address] [facility]  P: [phone]  F: [phone] [x] [address]  P: [phone]  F: [phone] [] [address]  [facility]   [address]   [place]: [phone]  F: [phone]      Physical Therapy Daily Treatment Note    Date:  2021  Patient Name:  Jesse Cooks    :  1975  MRN: [id number]  Physician: Suzie Valdes MD    Insurance: [employer]  Medical Diagnosis:   E92.690 (ICD-10-CM) - Posterior tibial tendinitis of right lower ewmxtfsqlV22.573 (ICD-10-CM) - Equinus contracture of ankle  M19.079 (ICD-10-CM) - Arthritis of midfoot  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 571   Onset date: 2021               Next 's appt. : 3/3/2022  Visit# / total visits:      Cancels/No Shows: 0/2    Subjective:    Pain:  [x] Yes  [] No Location: Right ankle / foot Pain Rating: (0-10 scale) 0/10  Pain altered Tx:  [] No  [] Yes  Action:  Comments: Patient states that she felt relief after IDN last visit. The patient had received an injection into the TC joint which has provided her much relief. Patient reports that Dr. Amanda Sutton will not sign off on her short term disability and will be seeing podiatric associates today to discuss short term disability. Reports [employer] will not allow her to return to work with limitations.       Objective:  Modalities:   Precautions:  Exercises:  Exercise Reps/ Time Weight/ Level Comments         Side lying       Clamshell 2 x 10 lime    Hip abduction 2 x 10                 Seated       Seated Great Toe Extension 2 x 10       Seated Lesser Toes Extension    2 x 10       Toe Spreading 2 x 10       Arch Lifting 2 x 10       Ankle plantar flexion 2 x 15 Blue    Ankle dorsiflexion 2 x 15 Blue    Ankle inversion 2 x 15 Blue    Ankle eversion 2 x 15 Blue 2 sets out and up  2 sets out and down    Baps circles 2 x 10 L2          Standing      Tandem stance on foam pad 3 x 30\"     SLS 1 x 15\"  Pain   Heel raises  2 x 10  With ball between heels       HEP:   Long Sitting Ankle Plantar Flexion with Resistance - 1 x daily - 7 x weekly - 3 sets - 10 reps  Long Sitting Ankle Dorsiflexion with Anchored Resistance - 1 x daily - 7 x weekly - 3 sets - 10 reps  Ankle Inversion with Resistance - 1 x daily - 7 x weekly - 3 sets - 10 reps  Ankle Eversion with Resistance - 1 x daily - 7 x weekly - 3 sets - 10 reps  Clamshell with Resistance - 1 x daily - 7 x weekly - 3 sets - 10 reps  Sidelying Hip Abduction - 1 x daily - 7 x weekly - 3 sets - 10 reps    Other:  Dry needling was performed in conjunction with Manual Therapy to promote soft tissue extensibility, improve tissue quality, improve ROM, normalize joint biomechanics and reduce pain symptoms. No charge submitted for the time the needles were inserted. Dry Needle Dosage    Body region  (Homeostatic region, Symptomatic region, Paraspinal region)  Size and Number of Needles Technique  (Static, Tenting, Pistoning)   Homeostatic: Calf, Fibular, Saphenous    Symptomatic: Medial / lateral gastroc head  Paraspinal: Size: 3\"  Number: 7 Mild tenting in the calf homeostatic point and medial gastroc head, tenting in gastroc        Treatment Charges: Mins Units   [x]  Modalities: HP 10 -   [x]  Ther Exercise 25 2   [x]  Manual Therapy 15 1   []  Ther Activities     []  Aquatics     [x]  Vasocompression     [x]  Other: IDN 5 -   Total Treatment time 50 3       Assessment: [x] Progressing toward goals.  Due to patient positive response to IDN last treatment continued with IDN at the start of session with the addition of one homeostatic insertion (Saphenous); Followed IDn with manual via hypervolt. Continued with exercises as listed above with increased resistance during 4 way ankle exercises. Applied motion control taping of the subtalar joint into inversion to minimize subtalar eversion compensation. Completed all standing exercises with tape applied with decreased symptoms. Patient reports minor pain symptoms over site of the steroid injection. [] No change. [] Other:  [x] Patient would continue to benefit from skilled physical therapy services in order to: The patient is a 43 y/o female that presents with right foot pain. The patient presents with impairments of increased pain, decreased foot control, decreased DF ROM and poor tolerance to work related activities. The patient signs and symptoms may be consistent with plantar fasciitis and posterior tibials insufficiency. The patient will benefit from therapy services to address the above impairments  to return to previous level of     STG: (to be met in 8 treatments)  1. ? Pain: Patient will decrease right foot pain to < or = to 2/10 with weight bearing activity   2. ? ROM: Patient will increase right ankle ROM to > or = to 10 degrees. 3. ? Strength: Patient will demonstrate good muscle coordination during toe yoga exercises. 4. ? Function: Patient will be able to tolerate > 1 hour of standing. 5. Patient to be independent with home exercise program as demonstrated by performance with correct form without cues. 6. Demonstrate Knowledge of fall prevention  LTG: (to be met in 16 treatments)  1. Patient will increase bilateral hip strength to > or = to 4+/5.  2. Patient will be able to work 1 work shift without increased right foot pain. 3. Patient will decrease LEFS score to < or = to 25% impairment.        Pt. Education:  [x] Yes  [] No  [x] Reviewed Prior HEP/Ed  Method of Education: [x] Verbal  [] Demo  [x] Written    Access Code: [email or web address]  URL: [email or web address]  Date: 12/17/2021  Prepared by: Levi Mishra    Exercises  Long Sitting Ankle Plantar Flexion with Resistance - 1 x daily - 7 x weekly - 3 sets - 10 reps  Long Sitting Ankle Dorsiflexion with Anchored Resistance - 1 x daily - 7 x weekly - 3 sets - 10 reps  Ankle Inversion with Resistance - 1 x daily - 7 x weekly - 3 sets - 10 reps  Ankle Eversion with Resistance - 1 x daily - 7 x weekly - 3 sets - 10 reps    Access Code: [email or web address]  URL: [email or web address]  Date: 12/29/2021  Prepared by: Levi Mishra    Exercises  Clamshell with Resistance - 1 x daily - 7 x weekly - 3 sets - 10 reps  Sidelying Hip Abduction - 1 x daily - 7 x weekly - 3 sets - 10 reps        Comprehension of Education:  [x] Verbalizes understanding. [x] Demonstrates understanding. [] Needs review. [x] Demonstrates/verbalizes HEP/Ed previously given. Plan: [x] Continue current frequency toward long and short term goals. [x] Specific Instructions for subsequent treatments: Specific Instructions for next treatment: Foot intrinsic training (Great toe extension, Lesser toe extension, Toe splaying, foot doming), Posterior tibialis resisted TB exercises, TC joint mobilizations, hip strengthening. Progress to standing / WB foot intrinsic exercises. Time In: 9:30 pm            Time Out: 10:30 pm    Electronically signed by:   Levi Mishra PT

## 2021-12-30 ENCOUNTER — TELEPHONE (OUTPATIENT)
Dept: ORTHOPEDIC SURGERY | Age: 46
End: 2021-12-30

## 2021-12-30 NOTE — TELEPHONE ENCOUNTER
Spoke with patient regarding Tuesday's visit. Patient is not ready to go back to work and her work will not take restrictions. After she talked to her STD, if our office types up the restrictions as discussed, her short term disability will continue if her work cannot accommodate. On Tuesday we had discussed restrictions for 6 weeks to ease her back to working full time and to allow her to continue PT. I typed up a letter for seated work for the next 6 weeks then to progress activity at that time. Patient has a follow up appointment on 3/3/2022 where work status will be reevaluated with goal of RTW without restrictions.

## 2022-01-03 ENCOUNTER — APPOINTMENT (OUTPATIENT)
Dept: PHYSICAL THERAPY | Facility: CLINIC | Age: 47
End: 2022-01-03
Payer: COMMERCIAL

## 2022-01-05 ENCOUNTER — HOSPITAL ENCOUNTER (OUTPATIENT)
Dept: PHYSICAL THERAPY | Facility: CLINIC | Age: 47
Setting detail: THERAPIES SERIES
Discharge: HOME | End: 2022-01-05
Payer: COMMERCIAL

## 2022-01-05 PROCEDURE — 97140 MANUAL THERAPY 1/> REGIONS: CPT

## 2022-01-05 PROCEDURE — 97110 THERAPEUTIC EXERCISES: CPT

## 2022-01-05 NOTE — FLOWSHEET NOTE
[] [facility]) - [facility] &  Therapy  [address]  [phone]  F: [phone] [] [address]  [employer] [address]   [address]  P: [phone]  F: [phone] [] [address] &  Therapy  [address]  P: [phone]  F: [phone] [x] [address]  P: [phone]  F: [phone] [] [address]  [facility]   [address]   [place]: [phone]  F: [phone]      Physical Therapy Daily Treatment Note    Date:  2022  Patient Name:  Juline Ganser    :  1975  MRN: [id number]  Physician: Dorie Morales MD    Insurance: [employer]  Medical Diagnosis:   B65.813 (ICD-10-CM) - Posterior tibial tendinitis of right lower gbxtbjncoP79.573 (ICD-10-CM) - Equinus contracture of ankle  M19.079 (ICD-10-CM) - Arthritis of midfoot  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 571   Onset date: 2021               Next 's appt. : 3/3/2022  Visit# / total visits:      Cancels/No Shows: 0/2    Subjective:    Pain:  [x] Yes  [] No Location: Right ankle / foot Pain Rating: (0-10 scale) 5/10  Pain altered Tx:  [] No  [] Yes  Action:  Comments: Patient states that after last session she had relief of symptoms until Saturday (4 days). States that she is going to be on medical leave until March.       Objective:  Modalities:   Precautions:  Exercises:  Exercise Reps/ Time Weight/ Level Comments Completed           Side lying        Clamshell 2 x 15 lime  x   Hip abduction 2 x 15   x                 Seated        Seated Great Toe Extension 2 x 10     -   Seated Lesser Toes Extension    2 x 10     -   Toe Spreading 2 x 10     -   Arch Lifting 2 x 10     -   Ankle plantar flexion 2 x 15 Blue  -   Ankle dorsiflexion 2 x 15 Blue -   Ankle inversion 2 x 15 Blue  -   Ankle eversion 2 x 15 Blue 2 sets out and up  2 sets out and down  -   Baps circles 2 x 10 L2  -          Standing       Gastroc and soleus stretch 3 x 30\"  On wedge  x   Tandem stance on foam pad 3 x 30\"   x   SLS 2 x 30\"   x   Heel raises  2 x 10  With ball between heels and 3\" eccentric x   Lateral heel taps    Increased pain x   Star drill   Increased pain x                    HEP:   Long Sitting Ankle Plantar Flexion with Resistance - 1 x daily - 7 x weekly - 3 sets - 10 reps  Long Sitting Ankle Dorsiflexion with Anchored Resistance - 1 x daily - 7 x weekly - 3 sets - 10 reps  Ankle Inversion with Resistance - 1 x daily - 7 x weekly - 3 sets - 10 reps  Ankle Eversion with Resistance - 1 x daily - 7 x weekly - 3 sets - 10 reps  Clamshell with Resistance - 1 x daily - 7 x weekly - 3 sets - 20 reps  Sidelying Hip Abduction - 1 x daily - 7 x weekly - 3 sets - 10 reps  Single leg stance x 30 sec    Other:  Dry needling was performed in conjunction with Manual Therapy to promote soft tissue extensibility, improve tissue quality, improve ROM, normalize joint biomechanics and reduce pain symptoms. No charge submitted for the time the needles were inserted. Dry Needle Dosage    Body region  (Homeostatic region, Symptomatic region, Paraspinal region)  Size and Number of Needles Technique  (Static, Tenting, Pistoning)   Homeostatic: Calf, Fibular, Saphenous    Symptomatic: Medial / lateral gastroc head  Paraspinal: Size: 3\"  Number: 7 Mild tenting in the calf homeostatic point and medial gastroc head, tenting in gastroc        Treatment Charges: Mins Units   [x]  Modalities: HP     [x]  Ther Exercise 30 2   [x]  Manual Therapy 15 1   []  Ther Activities     []  Aquatics     [x]  Vasocompression     [x]  Other: IDN 5 -   Total Treatment time 50 3       Assessment: [x] Progressing toward goals.  Due to patient positive response to IDN last treatment continued with IDN at the start of 3. Patient will decrease LEFS score to < or = to 25% impairment. Pt. Education:  [x] Yes  [] No  [x] Reviewed Prior HEP/Ed  Method of Education: [x] Verbal  [] Demo  [x] Written    Access Code: [email or web address]  URL: [email or web address]  Date: 12/17/2021  Prepared by: Malia Foy    Exercises  Long Sitting Ankle Plantar Flexion with Resistance - 1 x daily - 7 x weekly - 3 sets - 10 reps  Long Sitting Ankle Dorsiflexion with Anchored Resistance - 1 x daily - 7 x weekly - 3 sets - 10 reps  Ankle Inversion with Resistance - 1 x daily - 7 x weekly - 3 sets - 10 reps  Ankle Eversion with Resistance - 1 x daily - 7 x weekly - 3 sets - 10 reps    Access Code: [email or web address]  URL: [email or web address]  Date: 12/29/2021  Prepared by: Malia Foy    Exercises  Clamshell with Resistance - 1 x daily - 7 x weekly - 3 sets - 10 reps  Sidelying Hip Abduction - 1 x daily - 7 x weekly - 3 sets - 10 reps        Comprehension of Education:  [x] Verbalizes understanding. [x] Demonstrates understanding. [] Needs review. [x] Demonstrates/verbalizes HEP/Ed previously given. Plan: [x] Continue current frequency toward long and short term goals. [x] Specific Instructions for subsequent treatments: Specific Instructions for next treatment: Foot intrinsic training (Great toe extension, Lesser toe extension, Toe splaying, foot doming), Posterior tibialis resisted TB exercises, TC joint mobilizations, hip strengthening. Progress to standing / WB foot intrinsic exercises. Time In: 9:30 pm            Time Out: 10:30 pm    Electronically signed by:   Malia Foy, PT

## 2022-01-07 ENCOUNTER — HOSPITAL ENCOUNTER (OUTPATIENT)
Dept: PHYSICAL THERAPY | Facility: CLINIC | Age: 47
Setting detail: THERAPIES SERIES
Discharge: HOME | End: 2022-01-07
Payer: COMMERCIAL

## 2022-01-07 PROCEDURE — 97016 VASOPNEUMATIC DEVICE THERAPY: CPT

## 2022-01-07 PROCEDURE — 97110 THERAPEUTIC EXERCISES: CPT

## 2022-01-07 NOTE — FLOWSHEET NOTE
[] [facility]) - [facility] &  Therapy  [address]  [phone]  F: [phone] [] [address]  [address]   [address]  P: [phone]  F: [phone] [] [address] &  Therapy  [address]  P: [phone]  F: [phone] [x] [address]  P: [phone]  F: [phone] [] [address]  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 571   Onset date: 2021               Next 's appt. : 3/3/2022  Visit# / total visits:      Cancels/No Shows: 0/2    Subjective:    Pain:  [x] Yes  [] No Location: Right ankle / foot Pain Rating: (0-10 scale) 5/10  Pain altered Tx:  [] No  [] Yes  Action:  Comments: Patient states that she did not have much relief from last session due to \"running around\". States she did have improvement in the fact that she was able to complete 2 hours of running before having symptoms.       Objective:  Modalities:   Precautions:  Exercises:  Exercise Reps/ Time Weight/ Level Comments Completed    Nu-Step 10' 5  x   Side lying        Clamshell 2 x 15 lime  x   Hip abduction 2 x 15   x                 Seated        Seated Great Toe Extension 2 x 10    standing x   Seated Lesser Toes Extension    2 x 10    standing x   Toe Spreading 2 x 10    standing x   Arch Lifting 2 x 10    standing w/ 5 sec hold x   Ankle plantar flexion 2 x 15 Purple  x   Ankle dorsiflexion 2 x 15 Purple  x   Ankle inversion 2 x 15 Purple  x   Ankle eversion 2 x 15 Purple 2 sets out and up  2 sets out and down  x   Baps circles 2 x 10 L2  -          Standing       Gastroc and soleus stretch 3 x 30\"  On wedge  x   Tandem stance on foam pad 3 x 30\"   x   SLS 2 x 30\"   x   Heel raises  2 x 15  With ball between heels and 3\" eccentric x   Lateral heel taps    Increased pain x   Star drill   Increased pain -                    HEP:   Long Sitting Ankle Plantar Flexion with Resistance - 1 x daily - 7 x weekly - 3 sets - 10 reps  Long Sitting Ankle Dorsiflexion with Anchored Resistance - 1 x daily - 7 x weekly - 3 sets - 10 reps  Ankle Inversion with Resistance - 1 x daily - 7 x weekly - 3 sets - 10 reps  Ankle Eversion with Resistance - 1 x daily - 7 x weekly - 3 sets - 10 reps  Clamshell with Resistance - 1 x daily - 7 x weekly - 3 sets - 20 reps  Sidelying Hip Abduction - 1 x daily - 7 x weekly - 3 sets - 10 reps  Single leg stance x 30 sec    Other:  Dry needling was performed in conjunction with Manual Therapy to promote soft tissue extensibility, improve tissue quality, improve ROM, normalize joint biomechanics and reduce pain symptoms. No charge submitted for the time the needles were inserted.    Dry Needle Dosage    Body region  (Homeostatic region, Symptomatic region, Paraspinal region)  Size and Number of Needles Technique  (Static, Tenting, Pistoning)   Homeostatic: Calf, Fibular, Saphenous    Symptomatic: Medial / lateral gastroc head  Paraspinal: Size: 3\"  Number: 7 Mild tenting in the calf homeostatic point and medial gastroc head, tenting in gastroc        Treatment Charges: Mins Units   [x]  Modalities: HP     [x]  Ther Exercise 40 3   [x]  Manual Therapy     []  Ther Activities     []  Aquatics     [x]  Vasocompression 15 1   [x]  Other: IDN     Total Treatment time 55 3 Assessment: [x] Progressing toward goals. Patient reports increased soreness this date therefore did not complete IDN or manual this date. Completed exercises as listed above with progression in foot intrinsic exercises into standing. Patient able to tolerate anterior and lateral step ups this date requiring mod verbal cues for hip flexion during lateral step downs; fair carryover. Will continue manual therapy as needed and progress foot intrinsic and LE strengthening. [] No change. [] Other:  [x] Patient would continue to benefit from skilled physical therapy services in order to: The patient is a 41 y/o female that presents with right foot pain. The patient presents with impairments of increased pain, decreased foot control, decreased DF ROM and poor tolerance to work related activities. The patient signs and symptoms may be consistent with plantar fasciitis and posterior tibials insufficiency. The patient will benefit from therapy services to address the above impairments  to return to previous level of     STG: (to be met in 8 treatments)  1. ? Pain: Patient will decrease right foot pain to < or = to 2/10 with weight bearing activity   2. ? ROM: Patient will increase right ankle ROM to > or = to 10 degrees. 3. ? Strength: Patient will demonstrate good muscle coordination during toe yoga exercises. 4. ? Function: Patient will be able to tolerate > 1 hour of standing. 5. Patient to be independent with home exercise program as demonstrated by performance with correct form without cues. 6. Demonstrate Knowledge of fall prevention  LTG: (to be met in 16 treatments)  1. Patient will increase bilateral hip strength to > or = to 4+/5.  2. Patient will be able to work 1 work shift without increased right foot pain. 3. Patient will decrease LEFS score to < or = to 25% impairment.        Pt. Education:  [x] Yes  [] No  [x] Reviewed Prior HEP/Ed  Method of Education: [x] Verbal  [] Demo  [x] Written    Access Code: [email or web address]  URL: [email or web address]  Date: 12/17/2021  Prepared by: Mei Marr    Exercises  Long Sitting Ankle Plantar Flexion with Resistance - 1 x daily - 7 x weekly - 3 sets - 10 reps  Long Sitting Ankle Dorsiflexion with Anchored Resistance - 1 x daily - 7 x weekly - 3 sets - 10 reps  Ankle Inversion with Resistance - 1 x daily - 7 x weekly - 3 sets - 10 reps  Ankle Eversion with Resistance - 1 x daily - 7 x weekly - 3 sets - 10 reps    Access Code: [email or web address]  URL: [email or web address]  Date: 12/29/2021  Prepared by: Mei Marr    Exercises  Clamshell with Resistance - 1 x daily - 7 x weekly - 3 sets - 10 reps  Sidelying Hip Abduction - 1 x daily - 7 x weekly - 3 sets - 10 reps        Comprehension of Education:  [x] Verbalizes understanding. [x] Demonstrates understanding. [] Needs review. [x] Demonstrates/verbalizes HEP/Ed previously given. Plan: [x] Continue current frequency toward long and short term goals. [x] Specific Instructions for subsequent treatments: Specific Instructions for next treatment: Foot intrinsic training (Great toe extension, Lesser toe extension, Toe splaying, foot doming), Posterior tibialis resisted TB exercises, TC joint mobilizations, hip strengthening. Progress to standing / WB foot intrinsic exercises. Time In: 1:55 pm            Time Out: 3:00 pm    Electronically signed by:   Mei Marr PT

## 2022-01-10 ENCOUNTER — HOSPITAL ENCOUNTER (OUTPATIENT)
Dept: PHYSICAL THERAPY | Facility: CLINIC | Age: 47
Setting detail: THERAPIES SERIES
Discharge: HOME | End: 2022-01-10
Payer: COMMERCIAL

## 2022-01-10 PROCEDURE — 97140 MANUAL THERAPY 1/> REGIONS: CPT

## 2022-01-10 PROCEDURE — 97110 THERAPEUTIC EXERCISES: CPT

## 2022-01-10 NOTE — FLOWSHEET NOTE
[] [facility]) - [facility] &  Therapy  [address]  [phone]  F: [phone] [] [address]  [facility]   [address]  P: [phone]  F: [phone] [] [address] &  Therapy  [address]  P: [phone]  F: [phone] [x] [address]  P: [phone]  F: [phone] [] [address]  [facility]   [address]   [place]: [phone]  F: [phone]      Physical Therapy Daily Treatment Note    Date:  1/10/2022  Patient Name:  Conor Herrera    :  1975  MRN: [id number]  Physician: Josue Hathaway MD    Insurance: [employer]  Medical Diagnosis:   L87.109 (ICD-10-CM) - Posterior tibial tendinitis of right lower gjfreoiavK57.573 (ICD-10-CM) - Equinus contracture of ankle  M19.079 (ICD-10-CM) - Arthritis of midfoot  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 571   Onset date: 2021               Next 's appt. : 3/3/2022  Visit# / total visits:      Cancels/No Shows: 0/2    Subjective:    Pain:  [x] Yes  [] No Location: Right ankle / foot Pain Rating: (0-10 scale) 5/10  Pain altered Tx:  [] No  [] Yes  Action:  Comments: Patient states that pain remains consistent. No changes in symptoms.      Objective:  Modalities:   Precautions:  Exercises:  Exercise Reps/ Time Weight/ Level Comments Completed    Nu-Step 10' 5  x   Side lying        Clamshell 2 x 15 lime     Hip abduction 2 x 15                    Seated        Seated Great Toe Extension 2 x 10    standing x   Seated Lesser Toes Extension    2 x 10    standing x   Toe Spreading 2 x 10    standing x   Arch Lifting 2 x 10    standing w/ 5 sec hold x   Ankle plantar flexion 2 x 15 Purple  -   Ankle dorsiflexion 2 x 15 Purple  -   Ankle inversion 2 x orthotic to minimize rearfoot valgus and over pronation. Patient able to complete exercise progressions of star drill and SLS on foam pad with new posterior post without increased pain symptoms. No changes to HEP this date in order to assess response to new post next session. [] No change. [] Other:  [x] Patient would continue to benefit from skilled physical therapy services in order to: The patient is a 43 y/o female that presents with right foot pain. The patient presents with impairments of increased pain, decreased foot control, decreased DF ROM and poor tolerance to work related activities. The patient signs and symptoms may be consistent with plantar fasciitis and posterior tibials insufficiency. The patient will benefit from therapy services to address the above impairments  to return to previous level of     STG: (to be met in 8 treatments)  1. ? Pain: Patient will decrease right foot pain to < or = to 2/10 with weight bearing activity   2. ? ROM: Patient will increase right ankle ROM to > or = to 10 degrees. 3. ? Strength: Patient will demonstrate good muscle coordination during toe yoga exercises. 4. ? Function: Patient will be able to tolerate > 1 hour of standing. 5. Patient to be independent with home exercise program as demonstrated by performance with correct form without cues. 6. Demonstrate Knowledge of fall prevention  LTG: (to be met in 16 treatments)  1. Patient will increase bilateral hip strength to > or = to 4+/5.  2. Patient will be able to work 1 work shift without increased right foot pain. 3. Patient will decrease LEFS score to < or = to 25% impairment. Pt. Education:  [x] Yes  [] No  [x] Reviewed Prior HEP/Ed  Method of Education: [x] Verbal  [] Demo  [x] Written    Access Code: [email or web address]  URL: [email or web address]  Date: 12/17/2021  Prepared by:  Shi Suárez    Exercises  Long Sitting Ankle Plantar Flexion with Resistance - 1 x daily - 7 x weekly - 3 sets - 10 reps  Long Sitting Ankle Dorsiflexion with Anchored Resistance - 1 x daily - 7 x weekly - 3 sets - 10 reps  Ankle Inversion with Resistance - 1 x daily - 7 x weekly - 3 sets - 10 reps  Ankle Eversion with Resistance - 1 x daily - 7 x weekly - 3 sets - 10 reps    Access Code: [email or web address]  URL: [email or web address]  Date: 12/29/2021  Prepared by: Brannon Velazquez    Exercises  Clamshell with Resistance - 1 x daily - 7 x weekly - 3 sets - 10 reps  Sidelying Hip Abduction - 1 x daily - 7 x weekly - 3 sets - 10 reps        Comprehension of Education:  [x] Verbalizes understanding. [x] Demonstrates understanding. [] Needs review. [x] Demonstrates/verbalizes HEP/Ed previously given. Plan: [x] Continue current frequency toward long and short term goals. [x] Specific Instructions for subsequent treatments: Specific Instructions for next treatment: Foot intrinsic training (Great toe extension, Lesser toe extension, Toe splaying, foot doming), Posterior tibialis resisted TB exercises, TC joint mobilizations, hip strengthening. Progress to standing / WB foot intrinsic exercises. Time In: 3:00 pm            Time Out: 3:57 pm    Electronically signed by:   Brannon Velazquez PT

## 2022-01-12 ENCOUNTER — HOSPITAL ENCOUNTER (OUTPATIENT)
Dept: PHYSICAL THERAPY | Facility: CLINIC | Age: 47
Setting detail: THERAPIES SERIES
Discharge: HOME | End: 2022-01-12
Payer: COMMERCIAL

## 2022-01-12 PROCEDURE — 97016 VASOPNEUMATIC DEVICE THERAPY: CPT

## 2022-01-12 PROCEDURE — 97110 THERAPEUTIC EXERCISES: CPT

## 2022-01-12 NOTE — FLOWSHEET NOTE
[] [facility]) - [facility] &  Therapy  [address]  [phone]  F: [phone] [] [address]  [place] [address]   [address]  P: [phone]  F: [phone] [] [address] &  Therapy  [address]  P: [phone]  F: [phone] [x] [address]  P: [phone]  F: [phone] [] [address]  [facility]   [address]   [place]: [phone]  F: [phone]      Physical Therapy Daily Treatment Note    Date:  2022  Patient Name:  Veleta Brittle    :  1975  MRN: [id number]  Physician: Keely Hannon MD    Insurance: [facility] Diagnosis:   N85.398 (ICD-10-CM) - Posterior tibial tendinitis of right lower hbymoxulyE83.573 (ICD-10-CM) - Equinus contracture of ankle  M19.079 (ICD-10-CM) - Arthritis of midfoot  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 571   Onset date: 2021               Next 's appt. : 3/3/2022  Visit# / total visits:      Cancels/No Shows: 0/2    Subjective:    Pain:  [x] Yes  [] No Location: Right ankle / foot Pain Rating: (0-10 scale) 6/10  Pain altered Tx:  [] No  [] Yes  Action:  Comments: Patient states that she is having increased pain this date. Reports that she did not do much walking, mostly relaxed during the day.      Objective:  Modalities:   Precautions:  Exercises:  Exercise Reps/ Time Weight/ Level Comments Completed    Nu-Step 10' 5  x          Supine       SLR 2 x 15  bilateral x          Prone       Hip extension 2 x 15  bilateral x          Side lying        Clamshell 2 x 15 lime  x   Hip abduction 2 x 15  bilateral x                 Seated        Great Toe Extension 2 x 15    standing x   Lesser Toes Extension    2 x 15    standing x   Toe Spreading 2 x 15    standing x   Arch Lifting 2 x 15    standing w/ 5 sec hold x   Ankle plantar flexion 2 x 15 Purple  -   Ankle dorsiflexion 2 x 15 Purple  -   Ankle inversion 2 x 15 Purple  -   Ankle eversion 2 x 15 Purple 2 sets out and up  2 sets out and down  -   Baps circles 2 x 10 L2  -          Standing       Gastroc and soleus stretch 3 x 30\"  On wedge  x   Tandem stance on foam pad 3 x 30\"   -   SLS 3 x 30\"  Foam pad  x   Heel raises  2 x 10  Up with two weight shift over to one x   Lateral heel taps    Increased pain -   Star drill 1 x 5   x   Rocker board 2 x 30\" L2  x             HEP:   Single Leg Stance on Foam Pad - 1 x daily - 7 x weekly - 3 sets - 1 reps - 30 sec hold  Standing Heel Raise with Support - 1 x daily - 7 x weekly - 2 sets - 10 reps  Arch Lifting - 1 x daily - 7 x weekly - 3 sets - 10 reps  Toe Spreading - 1 x daily - 7 x weekly - 3 sets - 10 reps  Seated Self Great Toe Stretch - 1 x daily - 7 x weekly - 3 sets - 1 reps - 30 sec hold      Other:  Dry needling was performed in conjunction with Manual Therapy to promote soft tissue extensibility, improve tissue quality, improve ROM, normalize joint biomechanics and reduce pain symptoms. No charge submitted for the time the needles were inserted. Dry Needle Dosage    Body region  (Homeostatic region, Symptomatic region, Paraspinal region)  Size and Number of Needles Technique  (Static, Tenting, Pistoning)   Homeostatic: Calf, Fibular, Saphenous    Symptomatic: Medial / lateral gastroc head  Paraspinal: Size: 3\"  Number: 7 Mild tenting in the calf homeostatic point and medial gastroc head, tenting in gastroc        Treatment Charges: Mins Units   []  Modalities:      [x]  Ther Exercise 45 3   []  Manual Therapy     []  Ther Activities     []  Aquatics     [x]  Vasocompression 15 1   []  Other: IDN     Total Treatment time 60 4       Assessment: [x] Progressing toward goals.  Do to minimal improvement in pain symptoms following IDN and manual the past two treatment sessions IDN and manual therapy was not completed. Treatment focused on LE strengthening and foot intrinsic strengthening. The patient demonstrates greatest difficulty with standing foot doming and toe splaying therefore continued these in the HEP. Added Resistance to heel raises by having the patient transfer weight to the affected LE during the eccentric phase. Will continue to progress LE strengthening and foot intrinsic strengthening as tolerated. Will complete a progress report next visit to document patient progress. [] No change. [] Other:  [x] Patient would continue to benefit from skilled physical therapy services in order to: The patient is a 43 y/o female that presents with right foot pain. The patient presents with impairments of increased pain, decreased foot control, decreased DF ROM and poor tolerance to work related activities. The patient signs and symptoms may be consistent with plantar fasciitis and posterior tibials insufficiency. The patient will benefit from therapy services to address the above impairments  to return to previous level of     STG: (to be met in 8 treatments)  1. ? Pain: Patient will decrease right foot pain to < or = to 2/10 with weight bearing activity   2. ? ROM: Patient will increase right ankle ROM to > or = to 10 degrees. 3. ? Strength: Patient will demonstrate good muscle coordination during toe yoga exercises. 4. ? Function: Patient will be able to tolerate > 1 hour of standing. 5. Patient to be independent with home exercise program as demonstrated by performance with correct form without cues. 6. Demonstrate Knowledge of fall prevention  LTG: (to be met in 16 treatments)  1. Patient will increase bilateral hip strength to > or = to 4+/5.  2. Patient will be able to work 1 work shift without increased right foot pain. 3. Patient will decrease LEFS score to < or = to 25% impairment.        Pt. Education:  [x] Yes  [] No  [x] Reviewed Prior HEP/Ed  Method of Education: [x] Verbal  [] Demo  [x] Written    Access Code: [email or web address]  URL: [email or web address]  Date: 01/12/2022  Prepared by: Darrell Hunter    Exercises  Single Leg Stance on Foam Pad - 1 x daily - 7 x weekly - 3 sets - 1 reps - 30 sec hold  Standing Heel Raise with Support - 1 x daily - 7 x weekly - 2 sets - 10 reps  Arch Lifting - 1 x daily - 7 x weekly - 3 sets - 10 reps  Toe Spreading - 1 x daily - 7 x weekly - 3 sets - 10 reps  Seated Self Great Toe Stretch - 1 x daily - 7 x weekly - 3 sets - 1 reps - 30 sec hold          Comprehension of Education:  [x] Verbalizes understanding. [x] Demonstrates understanding. [] Needs review. [x] Demonstrates/verbalizes HEP/Ed previously given. Plan: [x] Continue current frequency toward long and short term goals. [x] Specific Instructions for subsequent treatments: Specific Instructions for next treatment: Foot intrinsic training (Great toe extension, Lesser toe extension, Toe splaying, foot doming), Posterior tibialis resisted TB exercises, TC joint mobilizations, hip strengthening. Progress to standing / WB foot intrinsic exercises. Time In: 9:25 am            Time Out: 10:40 am    Electronically signed by:   Darrell Hunter PT

## 2022-01-17 ENCOUNTER — HOSPITAL ENCOUNTER (OUTPATIENT)
Dept: PHYSICAL THERAPY | Facility: CLINIC | Age: 47
Setting detail: THERAPIES SERIES
Discharge: HOME | End: 2022-01-17
Payer: COMMERCIAL

## 2022-01-17 PROCEDURE — 97530 THERAPEUTIC ACTIVITIES: CPT

## 2022-01-17 PROCEDURE — 97110 THERAPEUTIC EXERCISES: CPT

## 2022-01-17 PROCEDURE — 97016 VASOPNEUMATIC DEVICE THERAPY: CPT

## 2022-01-17 NOTE — PROGRESS NOTES
[] [facility]) - [facility] &  Therapy  [address]  [phone]  F: [phone] [] [address]  [place] [address]   [address]  P: [phone]  F: [phone] [] [address] &  Therapy  [address]  P: [phone]  F: [phone] [x] [address]  P: [phone]  F: [phone] [] [address]  [facility]   [address]   [place]: [phone]  F: [phone]      Physical Therapy Progress Note    Date: 2022      Patient: Ember Hunt  : 1975  MRN: [id number]    Physician: Justen Pollard MD    Insurance: [facility]  Medical Diagnosis:   V46.267 (ICD-10-CM) - Posterior tibial tendinitis of right lower [id number] (ICD-10-CM) - Equinus contracture of ankle  M19.079 (ICD-10-CM) - Arthritis of midfoot  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 571   Onset date: 2021               Next 's appt. : 3/3/2022  Visit# / total visits:                                   Cancels/No Shows: 0/2      Subjective:  Pain:  [x] Yes  [] No  Location: Right foot / ankle Pain Rating: (0-10 scale) 4-5/10  Pain altered Tx:  [] No  [] Yes  Action:  Comments: Patient states that she was walking in [employer] for 30 minutes this past weekend. She reports most of the pain is located in the front of the ankle, near the calcaneal tubercle. In the bottom of the foot feels like a stretch.      Objective:   ROM  ° A/P STRENGTH     Left Right Left Right   Hip           Flexion     4 4+   Extension     4 4   Abduction     4 4   IR           ER           Knee           Flexion     5 5   Extension     5 5   Ankle           Plantarflexion 45 45 5 5   Dorsiflexion 5 10 5 5   Inversion 30 40 5 5   Eversion 25 25 5 5 Assessment:  The patient is a 43 y/o female that has been seen by physical therapy staff two times per week for four weeks. The patient presents with improvements in foot intrinsic muscle coordination, hip strength and tolerance to standing / ambulation. Although the patient demonstrates improvements in standing tolerance she is unable to stand for > 30 minutes without increased symptoms. The patient still demonstrates impairments of pain, decreased standing / ambulation tolerance and hip strength. The patient will benefit from continued therapy services to address the above impairments to return to previous level of activity. STG: (to be met in 8 treatments)  1. ? Pain: Patient will decrease right foot pain to < or = to 2/10 with weight bearing activity    Not Met: continued 4-5/10  2. ? ROM: Patient will increase right ankle ROM to > or = to 10 degrees.    Not Met: ~5 degrees   3. ? Strength: Patient will demonstrate good muscle coordination during toe yoga exercises.    Met  4. ? Function: Patient will be able to tolerate > 1 hour of standing.    Not Met: reports can stand for ~ 20 minutes to 30 minutes before pain starts   5. Patient to be independent with home exercise program as demonstrated by performance with correct form without cues. Met  6. Demonstrate Knowledge of fall prevention   Met  LTG: (to be met in 16 treatments)  1.  Patient will increase bilateral hip strength to > or = to 4+/5.  2. Patient will be able to work 1 work shift without increased right foot pain.   3. Patient will decrease LEFS score to < or = to 25% impairment.        Treatment:   Modalities:   Precautions:  Exercises:  Exercise Reps/ Time Weight/ Level Comments Completed    Nu-Step 10' 5   x               Supine           SLR 2 x 15   bilateral x               Prone           Hip extension 2 x 15   bilateral x               Side lying            Clamshell 2 x 15 lime      Hip abduction 2 x 15   bilateral x                         Seated            Great Toe Extension 2 x 15    standing -   Lesser Toes Extension    2 x 15    standing -   Toe Spreading 2 x 15    standing x   Arch Lifting 2 x 15    standing w/ 5 sec hold x   Ankle plantar flexion 2 x 15 Purple   -   Ankle dorsiflexion 2 x 15 Purple   -   Ankle inversion 2 x 15 Purple   -   Ankle eversion 2 x 15 Purple 2 sets out and up  2 sets out and down  -   Baps circles 2 x 10 L2   -               Standing           Sit to stand  2 x 10  With arch lifting  x   Gastroc and soleus stretch 3 x 30\"   On wedge  x   Tandem stance on foam pad 3 x 30\"     -   SLS 3 x 30\"   Foam pad  x   Heel raises  2 x 15   Up with two weight shift over to one x   Lateral heel taps      Increased pain -   Star drill 1 x 5     -   Rocker board 2 x 30\" L2   -                  HEP:   Single Leg Stance on Foam Pad - 1 x daily - 7 x weekly - 3 sets - 1 reps - 30 sec hold  Standing Heel Raise with Support - 1 x daily - 7 x weekly - 2 sets - 10 reps  Arch Lifting - 1 x daily - 7 x weekly - 3 sets - 10 reps  Toe Spreading - 1 x daily - 7 x weekly - 3 sets - 10 reps  Seated Self Great Toe Stretch - 1 x daily - 7 x weekly - 3 sets - 1 reps - 30 sec hold      Other:       Treatment Charges: Mins Units   []  Modalities     [x]  Ther Exercise 25 2   []  Manual Therapy     [x]  Ther Activities 20 1   []  Aquatics     [x]  Vasocompression 15 1   []  Other     Total Treatment time 60 4      Pt. Education:  [x] Yes  [] No  [x] Reviewed Prior HEP/Ed  Method of Education: [x] Verbal  [x] Demo  [x] Written     Access Code: [email or web address]  URL: [email or web address]  Date: 01/17/2022  Prepared by:  Marco Antonio Lynn    Exercises  Sit to Stand without Arm Support - 1 x daily - 7 x weekly - 3 sets - 10 reps  Sidelying Hip Abduction - 1 x daily - 7 x weekly - 3 sets - 15 reps  Prone Hip Extension - 1 x daily - 7 x weekly - 3 sets - 15 reps  Gastroc Stretch on Wall - 2 x daily - 7 x weekly - 3 sets - 1 reps - 30 sec hold  Soleus Stretch on Wall - 2 x daily - 7 x weekly - 3 sets - 1 reps - 30 sec hold  Standing Heel Raise - 1 x daily - 7 x weekly - 3 sets - 15 reps      Comprehension of Education:  [x] Verbalizes understanding. [x] Demonstrates understanding. [] Needs review. [x] Demonstrates/verbalizes HEP/Ed previously given. Plan: [x] Continue current frequency toward long and short term goals. [x] Specific Instructions for subsequent treatments: Progress foot intrinsic strengthening to functional activities. Progress hip abduction and extension strengthening. Focus on single leg stance / ankle stability. Time In: 1:00 pm            Time Out: 2:05 pm      Treatment Plan:  [x] Therapeutic Exercise   15370  [] Iontophoresis: 4 mg/mL Dexamethasone Sodium Phosphate  mAmin  00795   [x] Therapeutic Activity  85380 [x] Vasopneumatic cold with compression  30986    [] Gait Training   55012 [] Ultrasound   55470   [x] Neuromuscular Re-education  58967 [] Electrical Stimulation Unattended  48859   [x] Manual Therapy  16050 [] Electrical Stimulation Attended  04787   [x] Instruction in HEP  [] Lumbar/Cervical Traction  68168   [] Aquatic Therapy   52804 [] Cold/hotpack    [] Massage   60855      [] Dry Needling, 1 or 2 muscles  25546   [] Biofeedback, first 15 minutes   87510  [] Biofeedback, additional 15 minutes   95960 [] Dry Needling, 3 or more muscles  87053       Patient Status:     [x] Continue per initial plan of care. [] Additional visits necessary. [] Other:     Requested Frequency/Duration: 2 times per week for 16 treatments. Electronically signed by Jer Mayes PT on 1/17/2022 at 1:00 PM      If you have any questions or concerns, please don't hesitate to call.   Thank you for your referral.    Physician Signature:________________________________Date:__________________  By signing above or cosigning this note, I have reviewed this plan of care and certify a need for medically necessary rehabilitation services.      *PLEASE SIGN ABOVE AND FAX BACK ALL PAGES*

## 2022-01-18 ENCOUNTER — TELEPHONE (OUTPATIENT)
Dept: ORTHOPEDIC SURGERY | Age: 47
End: 2022-01-18

## 2022-01-19 ENCOUNTER — HOSPITAL ENCOUNTER (OUTPATIENT)
Dept: PHYSICAL THERAPY | Facility: CLINIC | Age: 47
Setting detail: THERAPIES SERIES
Discharge: HOME | End: 2022-01-19
Payer: COMMERCIAL

## 2022-01-19 PROCEDURE — 97110 THERAPEUTIC EXERCISES: CPT

## 2022-01-19 NOTE — FLOWSHEET NOTE
[] [facility] &  Therapy  [address]  [phone]  F: [phone] [] [address]  [facility]   [address]  P: [phone]  F: [phone] [] 96 [facility] &  Therapy  [address] [facility]  P: [phone]  F: [phone] [x] [address]  P: [phone]  F: [phone] [] [address]  [facility]   [address]   [place]ri Melony: [phone]  F: [phone]      Physical Therapy Daily Treatment Note    Date:  2022  Patient Name:  Chandrika Li    :  1975  MRN: [id number]  Physician: Mau Kuo MD    Insurance: [facility] Diagnosis:   X84.986 (ICD-10-CM) - Posterior tibial tendinitis of right lower ltbqlwfcxC91.573 (ICD-10-CM) - Equinus contracture of ankle  M19.079 (ICD-10-CM) - Arthritis of midfoot  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 571   Onset date: 2021               Next 's appt. : 3/3/2022  Visit# / total visits:      Cancels/No Shows: 0/2    Subjective:    Pain:  [x] Yes  [] No Location: Right ankle / foot Pain Rating: (0-10 scale) 3/10  Pain altered Tx:  [] No  [] Yes  Action:  Comments: Patient states that she is having less pain this date as she has not been walking much. She completed laundry yesterday which increased her pain.       Objective:  Modalities:   Precautions:  Exercises:  Exercise Reps/ Time Weight/ Level Comments Completed    Nu-Step 10' 6  x          Supine       SLR 2 x 15  bilateral x          Prone       Hip extension 2 x 15  bilateral x          Side lying        Clamshell 2 x 15 blue  x   Hip abduction 2 x 15  bilateral x                 Seated        Great Toe Extension 2 x 15    standing -   Lesser Toes Extension    2 x 15    standing - Toe Spreading 2 x 15    standing -   Arch Lifting 2 x 15    standing w/ 5 sec hold -   Ankle plantar flexion 2 x 15 Purple  -   Ankle dorsiflexion 2 x 15 Purple  -   Ankle inversion 2 x 15 Purple  -   Ankle eversion 2 x 15 Purple 2 sets out and up  2 sets out and down  -   Baps circles 2 x 10 L2  -          Standing       Sit to stand with arch lifting 3 x 10   x   Gastroc and soleus stretch 3 x 30\"  On wedge  x   Lateral band walk 2 x 25 ft peach  x   Tandem stance on foam pad 3 x 30\"   -   SLS 3 x 30\"  Foam pad  x   Heel raises  2 x 10  Up with two weight shift over to one x   Lateral heel taps    Increased pain -   Star drill 1 x 5      Rocker board 2 x 30\" L2               HEP:   Sit to Stand without Arm Support w/ arch lifitng - 1 x daily - 7 x weekly - 3 sets - 10 reps  Sidelying Hip Abduction - 1 x daily - 7 x weekly - 3 sets - 15 reps  Prone Hip Extension - 1 x daily - 7 x weekly - 3 sets - 15 reps  Gastroc Stretch on Wall - 2 x daily - 7 x weekly - 3 sets - 1 reps - 30 sec hold  Soleus Stretch on Wall - 2 x daily - 7 x weekly - 3 sets - 1 reps - 30 sec hold  Standing Heel Raise - 1 x daily - 7 x weekly - 3 sets - 15 reps      Other:  Dry needling was performed in conjunction with Manual Therapy to promote soft tissue extensibility, improve tissue quality, improve ROM, normalize joint biomechanics and reduce pain symptoms. No charge submitted for the time the needles were inserted.    Dry Needle Dosage    Body region  (Homeostatic region, Symptomatic region, Paraspinal region)  Size and Number of Needles Technique  (Static, Tenting, Pistoning)   Homeostatic: Calf, Fibular, Saphenous    Symptomatic: Medial / lateral gastroc head  Paraspinal: Size: 3\"  Number: 7 Mild tenting in the calf homeostatic point and medial gastroc head, tenting in gastroc        Treatment Charges: Mins Units   []  Modalities:      [x]  Ther Exercise 50 3   []  Manual Therapy     []  Ther Activities     []  Aquatics     [] Vasocompression     []  Other: IDN     Total Treatment time 50 3       Assessment: [x] Progressing toward goals. Initiated treatment on the nu-step with increased resistance. Continued with exercises as listed above with good tolerance. Noted improvement in ability to maintain medial longitudinal arch during functional activities. Patient's pain continues to remain constant. Will progress as tolerated. [] No change. [] Other:  [x] Patient would continue to benefit from skilled physical therapy services in order to: The patient is a 43 y/o female that presents with right foot pain. The patient presents with impairments of increased pain, decreased foot control, decreased DF ROM and poor tolerance to work related activities. The patient signs and symptoms may be consistent with plantar fasciitis and posterior tibials insufficiency. The patient will benefit from therapy services to address the above impairments  to return to previous level of     STG: (to be met in 8 treatments)  1. ? Pain: Patient will decrease right foot pain to < or = to 2/10 with weight bearing activity   2. ? ROM: Patient will increase right ankle ROM to > or = to 10 degrees. 3. ? Strength: Patient will demonstrate good muscle coordination during toe yoga exercises. 4. ? Function: Patient will be able to tolerate > 1 hour of standing. 5. Patient to be independent with home exercise program as demonstrated by performance with correct form without cues. 6. Demonstrate Knowledge of fall prevention  LTG: (to be met in 16 treatments)  1. Patient will increase bilateral hip strength to > or = to 4+/5.  2. Patient will be able to work 1 work shift without increased right foot pain. 3. Patient will decrease LEFS score to < or = to 25% impairment. Pt. Education:  [x] Yes  [] No  [x] Reviewed Prior HEP/Ed  Method of Education: [x] Verbal  [] Demo  [x] Written    Access Code: [email or web address]  URL: [email or web address]  Date: 01/12/2022  Prepared by: Darrell Hunter    Exercises  Single Leg Stance on Foam Pad - 1 x daily - 7 x weekly - 3 sets - 1 reps - 30 sec hold  Standing Heel Raise with Support - 1 x daily - 7 x weekly - 2 sets - 10 reps  Arch Lifting - 1 x daily - 7 x weekly - 3 sets - 10 reps  Toe Spreading - 1 x daily - 7 x weekly - 3 sets - 10 reps  Seated Self Great Toe Stretch - 1 x daily - 7 x weekly - 3 sets - 1 reps - 30 sec hold          Comprehension of Education:  [x] Verbalizes understanding. [x] Demonstrates understanding. [] Needs review. [x] Demonstrates/verbalizes HEP/Ed previously given. Plan: [x] Continue current frequency toward long and short term goals. [x] Specific Instructions for subsequent treatments: Specific Instructions for next treatment: Foot intrinsic training (Great toe extension, Lesser toe extension, Toe splaying, foot doming), Posterior tibialis resisted TB exercises, TC joint mobilizations, hip strengthening. Progress to standing / WB foot intrinsic exercises. Time In: 12:05 pm            Time Out: 12:55 pm    Electronically signed by:   Darrell Hunter, PT

## 2022-01-24 ENCOUNTER — HOSPITAL ENCOUNTER (OUTPATIENT)
Dept: PHYSICAL THERAPY | Facility: CLINIC | Age: 47
Setting detail: THERAPIES SERIES
Discharge: HOME | End: 2022-01-24
Payer: COMMERCIAL

## 2022-01-24 PROCEDURE — 97110 THERAPEUTIC EXERCISES: CPT

## 2022-01-24 NOTE — FLOWSHEET NOTE
[] [facility]) - [facility] &  Therapy  [address]  [phone]  F: [phone] [] [address]  [facility] [address]   [address]  P: [phone]  F: [phone] [] [address] &  Therapy  [address] [facility]  P: [phone]  F: [phone] [x] [address]  P: [phone]  F: [phone] [] [address]  [facility]   [address]   [place]: [phone]  F: [phone]      Physical Therapy Daily Treatment Note    Date:  2022  Patient Name:  Dimitry Black    :  1975  MRN: [id number]  Physician: Gabby Jefferson MD    Insurance: [facility] [place] [address]  Medical Diagnosis:   J07.801 (ICD-10-CM) - Posterior tibial tendinitis of right lower [id number] (ICD-10-CM) - Equinus contracture of ankle  M19.079 (ICD-10-CM) - Arthritis of midfoot  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 571   Onset date: 2021               Next 's appt. : 3/3/2022  Visit# / total visits: 10/16     Cancels/No Shows: 0/2    Subjective:    Pain:  [x] Yes  [] No Location: Right ankle / foot Pain Rating: (0-10 scale) 5/10  Pain altered Tx:  [] No  [] Yes  Action:  Comments: Patient states that she has not seen an improvement in pain symptoms. Had increased activity this weekend, reports only able to walk 10 minutes without a rest. The patient reports that she is not ready to return to work on 2022.      Objective:  Modalities:   Precautions:  Exercises:  Exercise Reps/ Time Weight/ Level Comments Completed    Nu-Step 5' 6  x   Treadmill  7'   x   Supine       SLR 2 x 15 2#  x          Prone       Hip extension 2 x 15 2#  x          Side lying        Clamshell 2 x 20 blue  x   Hip abduction 2 x 15 2#  x                 Seated        Great Toe Extension 2 x 15    standing -   Lesser Toes Extension    2 x 15    standing -   Toe Spreading 2 x 15    standing -   Arch Lifting 2 x 15    standing w/ 5 sec hold -   Ankle plantar flexion 2 x 15 Purple  -   Ankle dorsiflexion 2 x 15 Purple  -   Ankle inversion 2 x 15 Purple  -   Ankle eversion 2 x 15 Purple 2 sets out and up  2 sets out and down  -   Baps circles 2 x 10 L2  -          Standing       Sit to stand with arch lifting 2 x 15   x   Gastroc and soleus stretch 3 x 30\"  On wedge  x   Marching  3 x 5   W/ arch lift x   Lateral band walk 2 x 25 ft peach  x   Tandem stance on foam pad 3 x 30\"   -   SLS 3 x 30\"  Foam pad  x   Heel raises  2 x 10  Up with two down with one  x   Lateral heel taps    Increased pain -   Star drill 1 x 5      Rocker board 2 x 30\" L2               HEP:   Clamshell with Resistance - 1 x daily - 7 x weekly - 2 sets - 20 reps  Sidelying Hip Abduction - 1 x daily - 7 x weekly - 2 sets - 20 reps  Gastroc Stretch on Wall - 1 x daily - 7 x weekly - 1 sets - 3 reps - 30 sec hold  Soleus Stretch on Wall - 1 x daily - 7 x weekly - 1 sets - 3 reps - 30 sec hold  Standing Marching - 1 x daily - 7 x weekly - 3 sets - 10 reps  Single Leg Heel Raise - 1 x daily - 7 x weekly - 3 sets - 10 reps        Other:    Patient able to tolerate 7.5 minutes of walking on a treadmill before experiencing increased symptoms     Dry needling was performed in conjunction with Manual Therapy to promote soft tissue extensibility, improve tissue quality, improve ROM, normalize joint biomechanics and reduce pain symptoms. No charge submitted for the time the needles were inserted.    Dry Needle Dosage    Body region  (Homeostatic region, Symptomatic region, Paraspinal region)  Size and Number of Needles Technique  (Static, Tenting, Pistoning)   Homeostatic: Calf, Fibular, Saphenous    Symptomatic: Medial / lateral gastroc head  Paraspinal: Size: 3\"  Number: 7 Mild tenting in the calf homeostatic point and medial gastroc head, tenting in gastroc        Treatment Charges: Mins Units   []  Modalities:      [x]  Ther Exercise 50 3   []  Manual Therapy     []  Ther Activities     []  Aquatics     []  Vasocompression     []  Other: IDN     Total Treatment time 50 3       Assessment: [] Progressing toward goals. [] No change. [x] Other: Patient continues to report no improvement in symptoms. Assessed tolerance to walking activities with the patient able to tolerate 7.5 minutes of walking without increased symptoms. Instructed to walk 7 minutes uninterrupted in order to build tolerance to ambulatory activities. Increased weight during SLR, prone hip extension and hip abduction. Added reps to clamshell and progressed heel raise to up with the eccentric phase being completed with single limb. Initiated SLS with arch lift during marching activities. Updated HEP as listed above. Will progress as tolerated and will re-assess at 12th treatment. [x] Patient would continue to benefit from skilled physical therapy services in order to: The patient is a 43 y/o female that presents with right foot pain. The patient presents with impairments of increased pain, decreased foot control, decreased DF ROM and poor tolerance to work related activities. The patient signs and symptoms may be consistent with plantar fasciitis and posterior tibials insufficiency. The patient will benefit from therapy services to address the above impairments  to return to previous level of     STG: (to be met in 8 treatments)  1. ? Pain: Patient will decrease right foot pain to < or = to 2/10 with weight bearing activity   2. ? ROM: Patient will increase right ankle ROM to > or = to 10 degrees. 3. ? Strength: Patient will demonstrate good muscle coordination during toe yoga exercises. 4. ? Function: Patient will be able to tolerate > 1 hour of standing.    5. Patient to be independent with home exercise program as demonstrated by performance with correct form without cues. 6. Demonstrate Knowledge of fall prevention  LTG: (to be met in 16 treatments)  1. Patient will increase bilateral hip strength to > or = to 4+/5.  2. Patient will be able to work 1 work shift without increased right foot pain. 3. Patient will decrease LEFS score to < or = to 25% impairment. Pt. Education:  [x] Yes  [] No  [x] Reviewed Prior HEP/Ed  Method of Education: [x] Verbal  [] Demo  [x] Written  Access Code: [email or web address]  URL: [email or web address]  Date: 01/24/2022  Prepared by: Swathi Chamberlain    Exercises  Clamshell with Resistance - 1 x daily - 7 x weekly - 2 sets - 20 reps  Sidelying Hip Abduction - 1 x daily - 7 x weekly - 2 sets - 20 reps  Gastroc Stretch on Wall - 1 x daily - 7 x weekly - 1 sets - 3 reps - 30 sec hold  Soleus Stretch on Wall - 1 x daily - 7 x weekly - 1 sets - 3 reps - 30 sec hold  Standing Marching - 1 x daily - 7 x weekly - 3 sets - 10 reps  Single Leg Heel Raise - 1 x daily - 7 x weekly - 3 sets - 10 reps            Comprehension of Education:  [x] Verbalizes understanding. [x] Demonstrates understanding. [] Needs review. [x] Demonstrates/verbalizes HEP/Ed previously given. Plan: [x] Continue current frequency toward long and short term goals. [x] Specific Instructions for subsequent treatments: Specific Instructions for next treatment: Foot intrinsic training (Great toe extension, Lesser toe extension, Toe splaying, foot doming), Posterior tibialis resisted TB exercises, TC joint mobilizations, hip strengthening. Progress to standing / WB foot intrinsic exercises. Time In: 2:01 pm            Time Out: 2:56 pm    Electronically signed by:   Swathi Chamberlain, PT

## 2022-01-26 ENCOUNTER — HOSPITAL ENCOUNTER (OUTPATIENT)
Dept: PHYSICAL THERAPY | Facility: CLINIC | Age: 47
Setting detail: THERAPIES SERIES
Discharge: HOME | End: 2022-01-26
Payer: COMMERCIAL

## 2022-01-26 PROCEDURE — 97140 MANUAL THERAPY 1/> REGIONS: CPT

## 2022-01-26 PROCEDURE — 97110 THERAPEUTIC EXERCISES: CPT

## 2022-01-26 PROCEDURE — 97530 THERAPEUTIC ACTIVITIES: CPT

## 2022-01-26 NOTE — PROGRESS NOTES
[] [facility]) - [facility] &  Therapy  [address]  [phone]  F: [phone] [] [address]  [place] [address]   [address]  P: [phone]  F: [phone] [] [address] &  Therapy  [address] [facility]  P: [phone]  F: [phone] [x] [address]  P: [phone]  F: [phone] [] [address]  [facility]   [address]   [place]: [phone]  F: [phone]      Physical Therapy Progress Note    Date: 2022      Patient: Inocencio Doan  : 1975  MRN: [id number]    Physician: Justen Pollard MD    Insurance: [facility]  Medical Diagnosis:   W25.411 (ICD-10-CM) - Posterior tibial tendinitis of right lower [id number] (ICD-10-CM) - Equinus contracture of ankle  M19.079 (ICD-10-CM) - Arthritis of midfoot  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 571   Onset date: 2021               Next 's appt. : 3/3/2022  Visit# / total visits:                                   Cancels/No Shows: 0/2      Subjective:  Pain:  [x] Yes  [] No  Location: Right foot / ankle Pain Rating: (0-10 scale) 6/10  Pain altered Tx:  [] No  [] Yes  Action:  Comments: The patient states that her pain remains consistent and constant. Reports that any pressure onto the foot increases her pain. States that in the shower she has trouble standing and needs to hold onto the wall for support. The patient reports adherence to HEP with no change in symptoms.       Objective:   ROM  ° A/P STRENGTH     Left Right Left Right   Hip           Flexion     4+ 4+   Extension     4+ 4+   Abduction     4 4   IR           ER           Knee           Flexion     5 5   Extension     5 5   Ankle           Plantarflexion 45 45 5 5   Dorsiflexion 5 10 5 5   Inversion 30 40 5 5   Eversion 25 25 5 5         Assessment:  The patient is a 41 y/o female that has been seen by physical therapy staff two times per week for six weeks. The first four weeks of treatment focused on manual therapy to improve tissue quality if the gastoc, soleus and intrinsic foot musculature. During this time treatment emphasized intrinsic foot muscle coordination / strength as well as posterior tibialis and gastroc strengthening. At four weeks a progress report was completed with the patient demonstrating improvements in muscle strength and coordination of the listed musculature, however no improvements in pain. The next two weeks of treatment focused on proximal muscle strengthening with emphasis on the hip abductors and extensors. The patient demonstrates improved hip strength but again no improvement in pain symptoms. Due to the patient demonstrating objective improvements and no changes in pain and standing / ambulatory tolerance, the patient will be placed on hold and referred back to referring physician (Dr. Gaurang Fisher) for further assessment. STG: (to be met in 8 treatments)  1. ? Pain: Patient will decrease right foot pain to < or = to 2/10 with weight bearing activity    Not Met: continued 4-6/10  2. ? ROM: Patient will increase right ankle ROM to > or = to 10 degrees.    Not Met: ~5 degrees   3. ? Strength: Patient will demonstrate good muscle coordination during toe yoga exercises.    Met  4. ? Function: Patient will be able to tolerate > 1 hour of standing.    Not Met: reports can stand for ~ 20 minutes to 30 minutes before pain starts and walk    7 minutes. 5. Patient to be independent with home exercise program as demonstrated by performance with correct form without cues. Met  6. Demonstrate Knowledge of fall prevention   Met  LTG: (to be met in 16 treatments)  1. Patient will increase bilateral hip strength to > or = to 4+/5.    Not Met: Decreased bilateral hip abduction strength   2. Patient will be able to work 1 work shift without increased right foot pain.    Not Met: Patient has not returned to work this date   3.  Patient will decrease LEFS score to < or = to 25% impairment.     Not Met: 27% impairment     Treatment:   Modalities:   Precautions:  Exercises:  Exercise Reps/ Time Weight/ Level Comments Completed    Nu-Step 5' 6   -   Treadmill  7'     x   Supine           SLR 2 x 15 2#   x               Prone           Hip extension 2 x 15 2#   x               Side lying            Clamshell 2 x 20 blue   x   Hip abduction 2 x 15 2#   x                           Seated            Great Toe Extension 2 x 15    standing -   Lesser Toes Extension    2 x 15    standing -   Toe Spreading 2 x 15    standing -   Arch Lifting 2 x 15    standing w/ 5 sec hold -   Ankle plantar flexion 2 x 15 Purple   -   Ankle dorsiflexion 2 x 15 Purple   -   Ankle inversion 2 x 15 Purple   -   Ankle eversion 2 x 15 Purple 2 sets out and up  2 sets out and down  -   Baps circles 2 x 10 L2   -               Standing           Sit to stand with arch lifting 2 x 15     -   Gastroc and soleus stretch 3 x 30\"   On wedge  -   Marching  3 x 5    W/ arch lift x   Lateral band walk 2 x 25 ft peach   -   Tandem stance on foam pad 3 x 30\"     -   SLS 3 x 30\"   Foam pad  x   Heel raises  2 x 10   Up with two down with one  x   Lateral heel taps      Increased pain -   Star drill 1 x 5         Rocker board 2 x 30\" L2                      HEP:   Clamshell with Resistance - 1 x daily - 7 x weekly - 2 sets - 20 reps  Sidelying Hip Abduction - 1 x daily - 7 x weekly - 2 sets - 20 reps  Gastroc Stretch on Wall - 1 x daily - 7 x weekly - 1 sets - 3 reps - 30 sec hold  Soleus Stretch on Wall - 1 x daily - 7 x weekly - 1 sets - 3 reps - 30 sec hold  Standing Marching - 1 x daily - 7 x weekly - 3 sets - 10 reps  Single Leg Heel Raise - 1 x daily - 7 x weekly - 3 sets - 10 reps    Other:       Treatment Charges: Mins Units   [] Modalities     [x]  Ther Exercise 15 1   []  Manual Therapy 15 1   [x]  Ther Activities 20 1   []  Aquatics     []  Vasocompression     []  Other     Total Treatment time 50 3      Pt. Education:  [x] Yes  [] No  [x] Reviewed Prior HEP/Ed  Method of Education: [x] Verbal  [x] Demo  [x] Written   Comprehension of Education:  [x] Verbalizes understanding. [x] Demonstrates understanding. [] Needs review. [x] Demonstrates/verbalizes HEP/Ed previously given. Plan: [] Continue current frequency toward long and short term goals. [x] Specific Instructions for subsequent treatments: Referral back to Dr. Tammie Rae       Time In: 2:00 pm            Time Out: 2:50 pm      Treatment Plan:  [] Therapeutic Exercise   77845  [] Iontophoresis: 4 mg/mL Dexamethasone Sodium Phosphate  mAmin  73780   [] Therapeutic Activity  95102 [] Vasopneumatic cold with compression  98426    [] Gait Training   74073 [] Ultrasound   04241   [] Neuromuscular Re-education  64122 [] Electrical Stimulation Unattended  93149   [] Manual Therapy  03195 [] Electrical Stimulation Attended  31045   [] Instruction in HEP  [] Lumbar/Cervical Traction  75973   [] Aquatic Therapy   09982 [] Cold/hotpack    [] Massage   87661      [] Dry Needling, 1 or 2 muscles  22130   [] Biofeedback, first 15 minutes   25397  [] Biofeedback, additional 15 minutes   96805 [] Dry Needling, 3 or more muscles  56655       Patient Status:     [] Continue per initial plan of care. [] Additional visits necessary. [x] Other: Patient will be placed on hold and referred back to Dr. Tammie Rae. Requested Frequency/Duration: 2 times per week for 16 treatments. Electronically signed by Darrell Hunter PT on 1/26/2022 at 2:06 PM      If you have any questions or concerns, please don't hesitate to call.   Thank you for your referral.    Physician Signature:________________________________Date:__________________  By signing above or cosigning this note, I have reviewed this plan of care and certify a need for medically necessary rehabilitation services.      *PLEASE SIGN ABOVE AND FAX BACK ALL PAGES*

## 2022-02-08 ENCOUNTER — OFFICE VISIT (OUTPATIENT)
Dept: ORTHOPEDIC SURGERY | Age: 47
End: 2022-02-08
Payer: COMMERCIAL

## 2022-02-08 VITALS — BODY MASS INDEX: 41.65 KG/M2 | WEIGHT: 250 LBS | HEIGHT: 65 IN | RESPIRATION RATE: 12 BRPM

## 2022-02-08 DIAGNOSIS — M72.2 PLANTAR FASCIITIS: Primary | ICD-10-CM

## 2022-02-08 DIAGNOSIS — M19.079 ARTHRITIS OF MIDFOOT: ICD-10-CM

## 2022-02-08 DIAGNOSIS — M76.821 POSTERIOR TIBIAL TENDINITIS OF RIGHT LOWER EXTREMITY: ICD-10-CM

## 2022-02-08 DIAGNOSIS — M24.573 EQUINUS CONTRACTURE OF ANKLE: ICD-10-CM

## 2022-02-08 DIAGNOSIS — M19.079 ARTHRITIS OF SUBTALAR JOINT: ICD-10-CM

## 2022-02-08 DIAGNOSIS — M25.371 INSTABILITY OF RIGHT ANKLE JOINT: ICD-10-CM

## 2022-02-08 PROCEDURE — 99213 OFFICE O/P EST LOW 20 MIN: CPT | Performed by: ORTHOPAEDIC SURGERY

## 2022-02-08 NOTE — PROGRESS NOTES
[address] AND SPORTS MEDICINE  [facility] Colleen Jimenez  [address] [facility] [address]  Dept: [phone]    Ambulatory Orthopedic Consult      CHIEF COMPLAINT:    Chief Complaint   Patient presents with    Foot Pain     right       HISTORY OF PRESENT ILLNESS:      The patient is a 42 y.o. female who is being seen for evaluation of the above, which began around 7/2/21 atraumatically. At today's visit, she is using no brace/assistive device. History is obtained today from:   [x]  the patient     [x]  EMR     []  one family member/friend    []  multiple family members/friends    []  other:      She reports that she has previously seen a podiatrist for this problem Willow Heredia). She localizes her pain to her right plantar heel and medial hindfoot. INTERVAL HISTORY 12/28/2021:  She is seen again today in the office for follow up of a previous issue (as above). Since being seen last, the patient is doing about the same overall. At today's visit, she is not using a brace or assistive device. History is obtained today from:   [x]  the patient     []  EMR     []  one family member/friend    []  multiple family members/friends    []  other:      INTERVAL HISTORY 2/8/2022:  She is seen again today in the office for follow up of a previous issue (as above). Since being seen last, the patient is doing worse. At today's visit, she is not using a brace or assistive device. History is obtained today from:   [x]  the patient     []  EMR     []  one family member/friend    []  multiple family members/friends    []  other:      She is referred here today from physical therapy, as she reports a painful \"popping\" in her subfibular region. She localizes most of her pain to her plantar heel. REVIEW OF SYSTEMS:  Musculoskeletal: See HPI for pertinent positives     Past Medical History:    She  has no past medical history on file.      Past Surgical History:    She  has no past surgical history on file. Current Medications:     Current Outpatient Medications:     naproxen (EC NAPROSYN) 500 MG EC tablet, Take 1 tablet by mouth 2 times daily as needed for Pain, Disp: 60 tablet, Rfl: 0     Allergies:    Patient has no known allergies. Family History:  family history is not on file. Social History:   Social History     Occupational History    Not on file   Tobacco Use    Smoking status: Current Every Day Smoker    Smokeless tobacco: Never Used   Substance and Sexual Activity    Alcohol use: Not on file    Drug use: Not on file    Sexual activity: Not on file     Customer service full-time    OBJECTIVE:  Resp 12   Ht 5' 5\" (1.651 m)   Wt 250 lb (113.4 kg)   BMI 41.60 kg/m²    Psych: awake, alert  Cardio:  well perfused extremities, no cyanosis  Resp:  normal respiratory effort  Musculoskeletal:    RLE:  Vascular: Limb well perfused, compartments soft/compressible. Skin: No erythema/ulcers. Intact. Neurovascular Status:  Grossly neurovascularly intact throughout   Tenderness to Palpation: Plantar heel, lateral hindfoot/subfibular region, minimal tenderness along the posterior tibial tendon  -Pain with resisted inversion  -Pes planus, equinus  -Instability:  Talar tilt: Positive; Anterior drawer: Equivocal  -No peroneal subluxation/dislocation with dorsiflexion + eversion or with circumduction  -Negative squeeze test of the calcaneus      LLE:  Vascular: Limb well perfused, compartments soft/compressible. Skin: No erythema/ulcers. Intact. Neurovascular Status:  Grossly neurovascularly intact throughout   Tenderness to Palpation:   -Pes planus, equinus      RADIOLOGY:   2/8/2022 No new radiology images today. Prior images reviewed for reference.         FINDINGS:  Three weightbearing views (AP, Mortise, and Lateral) of the right ankle and three weightbearing views (AP, Oblique, Lateral) of the right foot were obtained in the office today and reviewed, revealing no acute fracture, dislocation, or radioopaque foreign body/tumor. The ankle mortise is maintained with no widening of the clear spaces. Meary's angle is apex plantar, with widened talocalcaneal angle and talar head uncoverage. Degenerative changes diffusely throughout the midfoot, as well as at the subtalar joint, with joint space narrowing, sclerosis, and osteophytes. IMPRESSION: No acute fracture/dislocation. Pes planus. Electronically signed by Macy Chawla MD    Relevant previous imaging reviewed, both imaging and report(s) as below:    No results found. ASSESSMENT AND PLAN:  Body mass index is 41.6 kg/m². She has right foot pain, likely secondary to multiple issues. At her initial visit, it seems most of her pain was secondary to posterior tibial tendinitis along with pes planus and an equinus contracture, with a component of plantar fasciitis as well as some degenerative changes of the subtalar joint (a right subtalar joint injection on 12/20/2021 helped her pain approximately 50% for about 3 days) and diffusely throughout the midfoot. At today's visit, she reports that most of her pain is in her plantar heel as well as in the subfibular region, and she also reports a painful popping in the lateral hindfoot/ankle (this is also associated with a history of recurrent inversion injuries, as well as a positive talar tilt and equivocal anterior drawer test). Despite conservative management, her pain has been getting worse. Notably, she has the past medical history as above. She has a history of fibromyalgia, and tobacco use (reports she smokes 1/4 pack of cigarettes per day). We had a discussion today about the likely diagnosis and its natural history, physical exam and imaging findings, as well as various treatment options in detail. Surgically, we discussed the possible right gastroc recession with lateral ankle ligamentous stabilization. Prior to proceeding with anything surgical, I did first recommend an MRI as below. Surgically, we had previously discussed a possible future right flatfoot reconstruction, depending on her clinical course; however, given her history of tobacco use, I did recommend nonsurgical management. We have discussed her tobacco use in the context of a possible future surgery, I recommended tobacco cessation, and verbal understanding was expressed. Orders/referrals were placed as below at today's visit. She will continue to use her over-the-counter flatfoot style orthotic, the night splint, and she may continue physical therapy/home exercises for her posterior tibial tendinitis as well as her plantar fasciitis. She may continue to take her oral NSAIDs as prescribed as needed. In order to know exactly how to proceed with treatment, an MRI was ordered today to evaluate the plantar fascia and lateral ankle ligaments. This is medically necessary to evaluate the structures in this area, for both diagnosis and treatment. All questions were answered and the above plan was agreed upon. The patient will return to clinic after the MRI has been obtained without x-rays. At her next visit, we will review her MRI, and possibly consider a plantar fascia injection (she has previously had a plantar fascia injection prior to her initial office visit which she reports only helped for 2 days), a possible [place] brace versus lace up ankle brace, or possibly surgery as above.                    At the patient's next visit, depending on how the patient is doing and/or new imaging/labs results, we may consider the following options:    []  Lace up ankle     []  CAM boot         []  removable wrist brace     []  PT:        []  Wean out immobilization         []  Adv activity      []  Footmind        []  Spenco       []  Custom Orthotic:               []  [place] brace                    []  Rocker Bottom      []  Night splint    []  Heel cups        []  Strap        []  Toe gizmos    []  Topl        []  NSAIDs         []  Mary        []  Ref:         []  Stress Xray    []  CT        []  MRI  []  Inj:          []  Consider OR      []  Pick OR date    No follow-ups on file. No orders of the defined types were placed in this encounter. No orders of the defined types were placed in this encounter. Olvin Garibay MD  Orthopedic Surgery        Please excuse any typos/errors, as this note was created with the assistance of voice recognition software. While intending to generate a document that actually reflects the content of the visit, the document can still have some errors including those of syntax and sound-a-like substitutions which may escape proof reading. In such instances, actual meaning can be extrapolated by context.

## 2022-02-21 ENCOUNTER — HOSPITAL ENCOUNTER (OUTPATIENT)
Dept: MRI IMAGING | Age: 47
Discharge: HOME | End: 2022-02-23
Payer: COMMERCIAL

## 2022-02-21 DIAGNOSIS — M19.079 ARTHRITIS OF MIDFOOT: ICD-10-CM

## 2022-02-21 DIAGNOSIS — M76.821 POSTERIOR TIBIAL TENDINITIS OF RIGHT LOWER EXTREMITY: ICD-10-CM

## 2022-02-21 DIAGNOSIS — M72.2 PLANTAR FASCIITIS: ICD-10-CM

## 2022-02-21 DIAGNOSIS — M24.573 EQUINUS CONTRACTURE OF ANKLE: ICD-10-CM

## 2022-02-21 DIAGNOSIS — M19.079 ARTHRITIS OF SUBTALAR JOINT: ICD-10-CM

## 2022-02-21 DIAGNOSIS — M25.371 INSTABILITY OF RIGHT ANKLE JOINT: ICD-10-CM

## 2022-02-21 PROCEDURE — 73721 MRI JNT OF LWR EXTRE W/O DYE: CPT

## 2022-02-24 ENCOUNTER — HOSPITAL ENCOUNTER (OUTPATIENT)
Dept: PHYSICAL THERAPY | Facility: CLINIC | Age: 47
Setting detail: THERAPIES SERIES
Discharge: HOME | End: 2022-02-24
Payer: COMMERCIAL

## 2022-02-24 ENCOUNTER — OFFICE VISIT (OUTPATIENT)
Dept: ORTHOPEDIC SURGERY | Age: 47
End: 2022-02-24
Payer: COMMERCIAL

## 2022-02-24 VITALS — BODY MASS INDEX: 41.65 KG/M2 | HEIGHT: 65 IN | WEIGHT: 250 LBS | RESPIRATION RATE: 12 BRPM

## 2022-02-24 DIAGNOSIS — M65.9 SYNOVITIS OF ANKLE: ICD-10-CM

## 2022-02-24 DIAGNOSIS — M24.573 EQUINUS CONTRACTURE OF ANKLE: ICD-10-CM

## 2022-02-24 DIAGNOSIS — M72.2 PLANTAR FASCIITIS: ICD-10-CM

## 2022-02-24 DIAGNOSIS — M25.371 INSTABILITY OF RIGHT ANKLE JOINT: Primary | ICD-10-CM

## 2022-02-24 PROCEDURE — 99214 OFFICE O/P EST MOD 30 MIN: CPT | Performed by: ORTHOPAEDIC SURGERY

## 2022-02-24 PROCEDURE — 97161 PT EVAL LOW COMPLEX 20 MIN: CPT

## 2022-02-24 NOTE — CONSULTS
THE [facility] &  Therapy  [facility] [address]   [place]: [phone]  F: [phone]       Physical Therapy Evaluation    Date:  2022   Patient: Domingo Salgado  : 1975  MRN: [id number]  Physician: Dr. Maria De Jesus Betts: Cleve Pearson Diagnosis: Plantar Fasciitis, Equinus contracture of ankle, Instability of R ankle joint  Rehab Codes: M72.2 (Plantar Fasciitis), M24.573 (Equinus Contracture of ankle), M25.371 (Instability of right ankle joint)  Onset date: 2021   Next 's appt. : 3/3/22    Subjective:   CC/HPI: Patient is a 42year old female who presents to the clinic today with a chief complaint of R ankle pain. Patient states that she saw the doctor today for discussion of next steps of the current presentation of her R ankle. States that she will be getting surgery to \"lengthen the muscle in her foot\" and states she has a PMH of chronic ankle sprains. Patient arrives on this date for a DME evaluation to safely use various assistive devices. PMHx: Fibromyalgia, tobacco use, chronic ankle sprains    Tests: [] X-Ray:    [x] MRI: 22: Impression:   1. Moderate plantar calcaneal spur.  Moderate central cord plantar fasciitis   with low-grade interstitial partial-thickness tearing at the origin central   cord plantar fascia. 2. Mild-to-moderate diffuse degenerative changes as above. 3. Small posterior subtalar and dorsal talonavicular joint effusions.  Mild   edema in the subcutaneous fat about the ankle. 4. No acute ligamentous injury.       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 yes   Location R ankle foot   Pain Rating currently 5/10   Pain at worse 10/10   Pain at best 3/10   Description of pain Numbness and tingling, burning  Throbbing   What makes it worse Laying down in bed after a long day   What makes it better Not being on it       Objective:    ROM: WFL bilaterally    Strength: WFL Bilaterally       Educated pt on use of DME, including: (Check box of device used)     [x] Knee Scooter: Instructed pt on appropriate height being even with contralateral knee. Reviewed safety concerns such as not gliding on turns and using breaks appropriately. [] Rolling Walker: Instructed pt on appropriate height of even with wrists with arms at resting at side. Educated pt on safe gait pattern while using walker. Explained to pt the difference between a 4 wheeled walker and rolling walker and how a rolling walker is most appropriate for a NWB pt. [x] Axillary Crutches: Instructed pt on appropriate height of two finger width between crutch and axilla, as well as elbow flexion of approximately 20deg. Educated pt on how to adjust both crutch and handle height. [x] Stairs: Patient declined stair negotiation with crutches on this date due to increased anxiousness regarding stair ambulation with crutches. Patient demonstrated use of bilateral handrails for a/descending stairs with increased safety. Comments: Pt with good understanding of all techniques/uses and expressed no safety concerns. At this time pt will most benefit from use of use of crutches for home based community and a knee scooter for community ambulation and prolonged distances. Assessment:  STG: (to be met in 1 treatments)  1. Educate patient on proper use of DME MET  2. Patient to perform transfers and weight bearing status independent without assistance MET  3.  Demonstrate Knowledge of fall prevention MET Patient goals: safely ambulate after surgery    Rehab Potential:  [x] Good  [] Fair  [] Poor   Suggested Professional Referral:  [x] No  [] Yes:  Barriers to Goal Achievement[de-identified]  [x] No  [] Yes:  Domestic Concerns:  [x] No  [] Yes:    Pt. Education:  [x] Plans/Goals, Risks/Benefits discussed  [] Home exercise program: Educated patient on safe transfer techniques in order to maintain NWB status for initial weeks following surgery. Educated and demonstrated on use of axillary crutches and knee scooter for safe ambulation. Method of Education: [x] Verbal  [x] Demo  [] Written  Comprehension of Education:  [x] Verbalizes understanding. [x] Demonstrates understanding. [] Needs Review. [] Demonstrates/verbalizes understanding of HEP/Ed previously given. Treatment Plan:  [] Therapeutic Exercise      [] Manual Therapy       [] Instruction in HEP        [] Neuromuscular Re-education     [] Vasocompression Tammy Diesel)        [x] Gait Training                      []  Medication allergies reviewed for use of    Dexamethasone Sodium Phosphate 4mg/ml     with iontophoresis treatments. Pt is not allergic. Frequency:  1 x/week for 1 visit      Evaluation Complexity:  History (Personal factors, comorbidities) [] 0 [x] 1-2 [] 3+   Exam (limitations, restrictions) [x] 1-2 [] 3 [] 4+   Clinical presentation (progression) [x] Stable [] Evolving  [] Unstable   Decision Making [x] Low [] Moderate [] High    [x] Low Complexity [] Moderate Complexity [] High Complexity       The patient has been evaluated by Physical Therapy:     [x] He/She was able to demonstrate compliance with the relevant weight bearing restriction, and safe discharge to home is anticipated after surgery. []  He/She was unable to demonstrate compliance with the relevant weight bearing restriction, and further sessions with PT are unlikely to help this; discharge to a SNF is anticipated after surgery.     The following pieces of DME are mandatory for safe discharge to home:    [] rolling walker (2-wheel)    [x] crutches   [x] rolling knee scooter      [] wheelchair    [] other: ________________    The following pieces of DME are recommended as helpful but are optional:    [] rolling walker (2-wheel)   [] crutches    [] rolling knee scooter      [] wheelchair     [] other: ________________        Treatment Charges: Mins Units   [x] Evaluation       [x]  Low       []  Moderate       []  High 16 1   []  Modalities     []  Ther Exercise     []  Manual Therapy     []  Ther Activities     []  Aquatics     []  Vasocompression     []  Gait       TOTAL TREATMENT TIME: 16 minutes     Time in: 2:20   Time Out:2:36 pm    Electronically signed by: Calli Scheuermann, SPT. This document has been reviewed and approved by Nina N Renaldo Pagan PT        Physician Signature:________________________________Date:__________________  By signing above or cosigning this note, I have reviewed this plan of care and certify a need for medically necessary rehabilitation services.      *PLEASE SIGN ABOVE AND FAX BACK ALL PAGES*

## 2022-02-24 NOTE — PROGRESS NOTES
[address] [place] [address] AND SPORTS MEDICINE  [facility] Tammy Martin  [address] [address]  Dept: [phone]    Ambulatory Orthopedic Consult      CHIEF COMPLAINT:    Chief Complaint   Patient presents with    Ankle Pain     right       HISTORY OF PRESENT ILLNESS:      The patient is a 42 y.o. female who is being seen for evaluation of the above, which began around 7/2/21 atraumatically. At today's visit, she is using no brace/assistive device. History is obtained today from:   [x]  the patient     [x]  EMR     []  one family member/friend    []  multiple family members/friends    []  other:      She reports that she has previously seen a podiatrist for this problem Kirit Pacheco). She localizes her pain to her right plantar heel and medial hindfoot. INTERVAL HISTORY 12/28/2021:  She is seen again today in the office for follow up of a previous issue (as above). Since being seen last, the patient is doing about the same overall. At today's visit, she is not using a brace or assistive device. History is obtained today from:   [x]  the patient     []  EMR     []  one family member/friend    []  multiple family members/friends    []  other:      INTERVAL HISTORY 2/8/2022:  She is seen again today in the office for follow up of a previous issue (as above). Since being seen last, the patient is doing worse. At today's visit, she is not using a brace or assistive device. History is obtained today from:   [x]  the patient     []  EMR     []  one family member/friend    []  multiple family members/friends    []  other:      She is referred here today from physical therapy, as she reports a painful \"popping\" in her subfibular region. She localizes most of her pain to her plantar heel. INTERVAL HISTORY 2/24/2022:  She is seen again today in the office for follow up of imaging as below. Since being seen last, the patient is doing worse. At today's visit, she is using no brace/assistive device. History is obtained today from:   [x]  the patient     [x]  EMR     [x]  one family member/friend    []  multiple family members/friends    []  other:      She reports that she rolls her ankle on a daily basis. REVIEW OF SYSTEMS:  Musculoskeletal: See HPI for pertinent positives     Past Medical History:    She  has no past medical history on file. Past Surgical History:    She  has no past surgical history on file. Current Medications:     Current Outpatient Medications:     naproxen (EC NAPROSYN) 500 MG EC tablet, Take 1 tablet by mouth 2 times daily as needed for Pain, Disp: 60 tablet, Rfl: 0     Allergies:    Patient has no known allergies. Family History:  family history is not on file. Social History:   Social History     Occupational History    Not on file   Tobacco Use    Smoking status: Current Every Day Smoker    Smokeless tobacco: Never Used   Substance and Sexual Activity    Alcohol use: Not on file    Drug use: Not on file    Sexual activity: Not on file     Customer service full-time    OBJECTIVE:  Resp 12   Ht 5' 5\" (1.651 m)   Wt 250 lb (113.4 kg)   BMI 41.60 kg/m²    Psych: awake, alert  Cardio:  well perfused extremities, no cyanosis  Resp:  normal respiratory effort  Musculoskeletal:    RLE:  Vascular: Limb well perfused, compartments soft/compressible. Skin: No erythema/ulcers. Intact. Neurovascular Status:  Grossly neurovascularly intact throughout   Tenderness to Palpation: Plantar heel, lateral hindfoot/subfibular region, minimal tenderness along the posterior tibial tendon  -Pain with resisted inversion  -Pes planus   -Significant gastroc equinus  -Instability:  Talar tilt: Mildly positive (varus hindfoot axis);  Anterior drawer: Equivocal  -No peroneal subluxation/dislocation with dorsiflexion + eversion or with circumduction  -Negative squeeze test of the calcaneus      LLE:  Vascular: Limb well perfused, compartments soft/compressible. Skin: No erythema/ulcers. Intact. Neurovascular Status:  Grossly neurovascularly intact throughout   Tenderness to Palpation:   -Pes planus, equinus      RADIOLOGY:   2/24/2022 Prior images reviewed for reference. MRI images and radiology report reviewed, as below:    1. Moderate plantar calcaneal spur.  Moderate central cord plantar fasciitis   with low-grade interstitial partial-thickness tearing at the origin central   cord plantar fascia. 2. Mild-to-moderate diffuse degenerative changes as above. 3. Small posterior subtalar and dorsal talonavicular joint effusions.  Mild   edema in the subcutaneous fat about the ankle. 4. No acute ligamentous injury. FINDINGS:  Three weightbearing views (AP, Mortise, and Lateral) of the right ankle and three weightbearing views (AP, Oblique, Lateral) of the right foot were obtained in the office today and reviewed, revealing no acute fracture, dislocation, or radioopaque foreign body/tumor. The ankle mortise is maintained with no widening of the clear spaces. Meary's angle is apex plantar, with widened talocalcaneal angle and talar head uncoverage. Degenerative changes diffusely throughout the midfoot, as well as at the subtalar joint, with joint space narrowing, sclerosis, and osteophytes. IMPRESSION: No acute fracture/dislocation. Pes planus. Electronically signed by Aisha Vance MD    Relevant previous imaging reviewed, both imaging and report(s) as below:    No results found. ASSESSMENT AND PLAN:  Body mass index is 41.6 kg/m². She has right ankle pain with a history of ankle instability (mildly positive talar tilt, equivocal anterior drawer, varus hindfoot axis; reports a history of rolling her ankle daily), with likely underlying ankle synovitis, as well as a component of plantar fasciitis with a gastroc equinus contracture.   She also has a history of painful popping in the lateral hindfoot/ankle. She also has a history of right foot pain, secondary to posterior tibial tendinitis along with pes planus, as well as some degenerative changes of the subtalar joint (a right subtalar joint injection on 12/20/2021 helped her pain approximately 50% for about 3 days) and diffusely throughout the midfoot. Notably, she has the past medical history as above. She has a history of fibromyalgia, and tobacco use (reports she smokes 1/4 pack of cigarettes per day). We had a discussion today about the likely diagnosis and its natural history, physical exam and imaging findings, as well as various treatment options in detail. Surgically, we discussed a possible right lateral ankle ligamentous stabilization, with gastroc recession, and possible ankle arthroscopy. We discussed the expected postoperative course, including the relevant weightbearing restrictions/immobilization. After discussing both nonsurgical and surgical options, she reports that she is interested in proceeding with surgery in the near future, and does not wish to continue with any further conservative management at this time. We have discussed the patient's history of tobacco use, and that tobacco use puts her at higher risk for complications. We have discussed the risks of wound issues/infection, delayed healing/nonunion, and DVT/pulmonary embolus in the setting of tobacco use. I did recommend tobacco cessation, and verbal understanding was expressed. Surgically, we have previously discussed a possible future right flatfoot reconstruction, depending on her clinical course; however, given her history of tobacco use, I did recommend nonsurgical management. We have discussed her tobacco use in the context of a possible future surgery, I recommended tobacco cessation, and verbal understanding was expressed. Orders/referrals were placed as below at today's visit.      At today's visit, the patient was ordered DME as below (crutches/walker/rolling knee scooter), which are medically necessary items postoperatively. I also ordered physical therapy for the patient to help reinforce/teach the relevant weight bearing precautions, to help allow for safe transfers and early mobilization, as well as effectively utilize DME. Surgical booking paperwork was completed and submitted. She May continue to use her over-the-counter flatfoot style orthotic, the night splint, and she may continue physical therapy/home exercises for her posterior tibial tendinitis as well as her plantar fasciitis. She may continue to take her oral NSAIDs as prescribed as needed. All questions were answered and the above plan was agreed upon. The patient will return to clinic in the future for surgical discussion (right ankle lateral ligamentous stabilization, gastroc recession, and possible ankle arthroscopy with possible debridement). At the patient's next visit, depending on how the patient is doing and/or new imaging/labs results, we may consider the following options:    []  Lace up ankle     []  CAM boot         []  removable wrist brace     []  PT:        []  Wean out immobilization         []  Adv activity      []  Footmind        []  Spenco       []  Custom Orthotic:               []  AZ brace                    []  Rocker Bottom      []  Night splint    []  Heel cups        []  Strap        []  Toe gizmos    []  Topl        []  NSAIDs         []  Mary        []  Ref:         []  Stress Xray    []  CT        []  MRI  []  Inj:          []  Consider OR      []  Pick OR date    No follow-ups on file. No orders of the defined types were placed in this encounter. No orders of the defined types were placed in this encounter. Senora Bosworth, MD  Orthopedic Surgery        Please excuse any typos/errors, as this note was created with the assistance of voice recognition software.  While intending to generate a document that actually reflects the content of the visit, the document can still have some errors including those of syntax and sound-a-like substitutions which may escape proof reading. In such instances, actual meaning can be extrapolated by context.

## 2022-02-24 NOTE — LETTER
[address] [place] [address] and Sports Medicine  [place] [address]  Phone: [phone]  Fax: [phone]    Georgina Brower MD    February 28, 2022     Billy Dhaliwal APRLYUDMILA [address] [address]    Patient: Lisbet Aguilar   MR Number: [id number]   YOB: 1975   Date of Visit: 2/24/2022       Dear Billy Dhaliwal: Thank you for referring Inocencio Doan to me for evaluation/treatment. Below are the relevant portions of my assessment and plan of care. She has right ankle pain with a history of ankle instability (mildly positive talar tilt, equivocal anterior drawer, varus hindfoot axis; reports a history of rolling her ankle daily), with likely underlying ankle synovitis, as well as a component of plantar fasciitis with a gastroc equinus contracture. She also has a history of painful popping in the lateral hindfoot/ankle. She also has a history of right foot pain, secondary to posterior tibial tendinitis along with pes planus, as well as some degenerative changes of the subtalar joint (a right subtalar joint injection on 12/20/2021 helped her pain approximately 50% for about 3 days) and diffusely throughout the midfoot. Notably, she has the past medical history as above. She has a history of fibromyalgia, and tobacco use (reports she smokes 1/4 pack of cigarettes per day). We had a discussion today about the likely diagnosis and its natural history, physical exam and imaging findings, as well as various treatment options in detail. Surgically, we discussed a possible right lateral ankle ligamentous stabilization, with gastroc recession, and possible ankle arthroscopy. We discussed the expected postoperative course, including the relevant weightbearing restrictions/immobilization.   After discussing both nonsurgical and surgical options, she reports that she is interested in proceeding with surgery in the near future, and does not wish to continue with any further conservative management at this time. We have discussed the patient's history of tobacco use, and that tobacco use puts her at higher risk for complications. We have discussed the risks of wound issues/infection, delayed healing/nonunion, and DVT/pulmonary embolus in the setting of tobacco use. I did recommend tobacco cessation, and verbal understanding was expressed. Surgically, we have previously discussed a possible future right flatfoot reconstruction, depending on her clinical course; however, given her history of tobacco use, I did recommend nonsurgical management. We have discussed her tobacco use in the context of a possible future surgery, I recommended tobacco cessation, and verbal understanding was expressed. Orders/referrals were placed as below at today's visit. At today's visit, the patient was ordered DME as below (crutches/walker/rolling knee scooter), which are medically necessary items postoperatively. I also ordered physical therapy for the patient to help reinforce/teach the relevant weight bearing precautions, to help allow for safe transfers and early mobilization, as well as effectively utilize DME. Surgical booking paperwork was completed and submitted. She May continue to use her over-the-counter flatfoot style orthotic, the night splint, and she may continue physical therapy/home exercises for her posterior tibial tendinitis as well as her plantar fasciitis. She may continue to take her oral NSAIDs as prescribed as needed. All questions were answered and the above plan was agreed upon. The patient will return to clinic in the future for surgical discussion (right ankle lateral ligamentous stabilization, gastroc recession, and possible ankle arthroscopy with possible debridement). If you have questions, please do not hesitate to call me.  I look forward to following Georgina Wilkes along with you.     Sincerely,      Aisha Vance MD

## 2022-02-28 ENCOUNTER — ANESTHESIA EVENT (OUTPATIENT)
Dept: OPERATING ROOM | Age: 47
End: 2022-02-28
Payer: COMMERCIAL

## 2022-02-28 ENCOUNTER — TELEPHONE (OUTPATIENT)
Dept: ORTHOPEDIC SURGERY | Age: 47
End: 2022-02-28

## 2022-02-28 NOTE — TELEPHONE ENCOUNTER
Nakita Salinas from [address] [place] [address] is requesting the last two office notes for Pedro Pablo Denise to be faxed to her as soon as possible. She states she requested this on 2/14 and the confirmation was confirmed. Nakita Salinas states patients claim has been closed due to not receiving the information requested. Please fax to [phone]. Thank you.

## 2022-03-04 ENCOUNTER — ANESTHESIA (OUTPATIENT)
Dept: OPERATING ROOM | Age: 47
End: 2022-03-04
Payer: COMMERCIAL

## 2022-03-08 ENCOUNTER — HOSPITAL ENCOUNTER (OUTPATIENT)
Dept: PREADMISSION TESTING | Age: 47
Discharge: HOME | End: 2022-03-12
Payer: COMMERCIAL

## 2022-03-08 VITALS
BODY MASS INDEX: 43.12 KG/M2 | OXYGEN SATURATION: 98 % | HEART RATE: 89 BPM | SYSTOLIC BLOOD PRESSURE: 149 MMHG | DIASTOLIC BLOOD PRESSURE: 72 MMHG | HEIGHT: 65 IN | WEIGHT: 258.82 LBS | RESPIRATION RATE: 18 BRPM

## 2022-03-08 LAB
ABSOLUTE EOS #: 0.49 K/UL (ref 0–0.44)
ABSOLUTE IMMATURE GRANULOCYTE: 0.02 K/UL (ref 0–0.3)
ABSOLUTE LYMPH #: 2.13 K/UL (ref 1.1–3.7)
ABSOLUTE MONO #: 0.27 K/UL (ref 0.1–1.2)
BASOPHILS # BLD: 1 % (ref 0–2)
BASOPHILS ABSOLUTE: 0.05 K/UL (ref 0–0.2)
EOSINOPHILS RELATIVE PERCENT: 7 % (ref 1–4)
HCT VFR BLD CALC: 44 % (ref 36.3–47.1)
HEMOGLOBIN: 14.5 G/DL (ref 11.9–15.1)
IMMATURE GRANULOCYTES: 0 %
LYMPHOCYTES # BLD: 32 % (ref 24–43)
MCH RBC QN AUTO: 30.5 PG (ref 25.2–33.5)
MCHC RBC AUTO-ENTMCNC: 33 G/DL (ref 28.4–34.8)
MCV RBC AUTO: 92.6 FL (ref 82.6–102.9)
MONOCYTES # BLD: 4 % (ref 3–12)
NRBC AUTOMATED: 0 PER 100 WBC
PDW BLD-RTO: 13.3 % (ref 11.8–14.4)
PLATELET # BLD: 200 K/UL (ref 138–453)
PMV BLD AUTO: 8.7 FL (ref 8.1–13.5)
RBC # BLD: 4.75 M/UL (ref 3.95–5.11)
SEG NEUTROPHILS: 56 % (ref 36–65)
SEGMENTED NEUTROPHILS ABSOLUTE COUNT: 3.81 K/UL (ref 1.5–8.1)
WBC # BLD: 6.8 K/UL (ref 3.5–11.3)

## 2022-03-08 PROCEDURE — 93005 ELECTROCARDIOGRAM TRACING: CPT | Performed by: ANESTHESIOLOGY

## 2022-03-08 PROCEDURE — 85025 COMPLETE CBC W/AUTO DIFF WBC: CPT

## 2022-03-08 PROCEDURE — 36415 COLL VENOUS BLD VENIPUNCTURE: CPT

## 2022-03-08 RX ORDER — ACETAMINOPHEN 500 MG
1000 TABLET ORAL ONCE
Status: CANCELLED | OUTPATIENT
Start: 2022-03-18

## 2022-03-08 ASSESSMENT — PAIN DESCRIPTION - LOCATION: LOCATION: ANKLE

## 2022-03-08 ASSESSMENT — PAIN DESCRIPTION - PAIN TYPE: TYPE: CHRONIC PAIN

## 2022-03-08 ASSESSMENT — PAIN DESCRIPTION - DESCRIPTORS: DESCRIPTORS: CONSTANT;RADIATING;ACHING

## 2022-03-08 ASSESSMENT — PAIN DESCRIPTION - PROGRESSION: CLINICAL_PROGRESSION: GRADUALLY WORSENING

## 2022-03-08 ASSESSMENT — PAIN DESCRIPTION - ORIENTATION: ORIENTATION: RIGHT

## 2022-03-08 ASSESSMENT — PAIN - FUNCTIONAL ASSESSMENT: PAIN_FUNCTIONAL_ASSESSMENT: PREVENTS OR INTERFERES SOME ACTIVE ACTIVITIES AND ADLS

## 2022-03-08 ASSESSMENT — PAIN SCALES - GENERAL: PAINLEVEL_OUTOF10: 5

## 2022-03-08 ASSESSMENT — PAIN DESCRIPTION - ONSET: ONSET: AWAKENED FROM SLEEP

## 2022-03-08 ASSESSMENT — PAIN DESCRIPTION - FREQUENCY: FREQUENCY: CONTINUOUS

## 2022-03-08 NOTE — PRE-PROCEDURE INSTRUCTIONS
[facility] ON Friday, MARCH 18TH at 06:50 AM    Once you enter the hospital lobby, after your temperature is checked, take the elevators to the second floor. Check-In is at the surgery registration desk. One person age 25 or older may remain in the waiting area while you are in surgery. If you have been given a blood band, you must bring it with you the day of surgery. Continue to take your home medications as you normally do up to and including the night before surgery with the exception of any blood thinning medications. Please stop any blood thinning medications as directed by your surgeon or prescribing physician. Failure to stop certain medications may interfere with your scheduled surgery. These may include:  Aspirin, Warfarin (Coumadin), Clopidogrel (Plavix), Ibuprofen (Motrin, Advil), Naproxen (Aleve), Meloxicam (Mobic), Celecoxib (Celebrex), Eliquis, Pradaxa, Xarelto, Effient, Fish Oil, Herbal supplements. STOP NAPROXEN ONE WEEK PRIOR TO SURGERY, TYLENOL IS OKAY TO TAKE    If you are diabetic, do not take any of your diabetic medications by mouth the morning of surgery. If you are taking insulin contact the doctor that manages your diabetes for instructions about any changes to your insulin dosages the day before surgery. Do not inject insulin or other injectable diabetic medications the morning of surgery unless otherwise instructed by the doctor who manages your diabetes. Please take the following medication(s) the day of surgery with a small sip of water:    Please use your inhalers at home the day of surgery. PREPARING FOR YOUR SURGERY:     Before surgery, you can play an important role in your own health. Because skin is not sterile, we need to be sure that your skin is as free of germs as possible before surgery by carefully washing before surgery. Preparing or prepping skin before surgery can reduce the risk of a surgical site infection.   Do not shave the area of your body where your surgery will be performed unless you received specific permission from your physician. You will need to shower at home the night before surgery and the morning of surgery with a special soap called chlorhexidine gluconate (CHG*). *Not to be used by people allergic to Chlorhexidine Gluconate (CHG). Following these instructions will help you be sure that your skin is clean before surgery. Instructions on cleaning your skin before surgery: The night before your surgery:      You will need to shower with warm water (not hot) and the CHG soap.  Use a clean wash cloth and a clean towel. Have clean clothes available to put on after the shower.    First wash your hair with regular shampoo. Rinse your hair and body thoroughly to remove the shampoo. Reece Rajput Wash your face with your regular soap or water only. Thoroughly rinse your body with warm water from the neck down.  Turn water off to prevent rinsing the soap off too soon.  With a clean wet washcloth and half of the CHG soap in the bottle, lather your entire body from the neck down. Do not use CHG soap near your eyes or ears to avoid injury to those areas.  Wash thoroughly, paying special attention to the area where your surgery will be performed.  Wash your body gently for five (5) minutes. Avoid scrubbing your skin too hard.  Turn the water back on and rinse your body thoroughly.  Pat yourself dry with a clean, soft towel. Do not apply lotion, cream or powder.  Dress with clean freshly washed clothes. The morning of surgery:     Repeat shower following steps above - using remaining half of CHG soap in bottle. Patient Instructions:    Reece Rajput If you are having any type of anesthesia you are to have nothing to eat or drink after midnight the night before your surgery.   This includes gum, mints, water or smoking or chewing tobacco.  The only exception to this is a small sip of water to take with any morning dose of heart, blood pressure, or seizure medications. No alcoholic beverages for 24 hours prior to surgery.  Bring your eyeglasses and case with you. No contacts are to be worn the day of surgery. You also may bring your hearing aids. Most surgical procedures involving anesthesia will require that you remove your dentures prior to surgery.  If you are on C-PAP or Bi-PAP at home and plan on staying in the hospital overnight for your surgery please bring the machine with you. · Do not wear any jewelry or body piercings day of surgery. Also, NO lotion, perfume or deodorant to be used the day of surgery. No nail polish on the operative extremity (arm/leg surgeries)    ·   If you are staying overnight with us, please bring a small bag of personal items.  Please wear loose, comfortable clothing. If you are potentially going to have a cast or brace bring clothing that will fit over them.  In case of illness - If you have cold or flu like symptoms (high fever, runny nose, sore throat, cough, etc.) rash, nausea, vomiting, loose stools, and/or recent contact with someone who has a contagious disease (chicken pox, measles, etc.) Please call your doctor before coming to the hospital.     If your child is having surgery please make arrangements for any other children to be cared for at home on the day of surgery. Other children are not permitted in recovery room and we want you to be able to spend time with the patient. If other arrangements are not available then we suggest that you have a second adult to stay in the waiting room. Day of Surgery/Procedure:    As a patient at [place] [address] you can expect quality medical and nursing care that is centered on your individual needs.   Our goal is to make your surgical experience as comfortable as possible    . Transportation After Your Surgery/Procedure: You will need a friend or family member to drive you home after your procedure. Your  must be 25years of age or older and able to sign off on your discharge instructions. A taxi cab or any other form of public transportation is not acceptable. Your friend or family member must stay at the hospital throughout your procedure. Someone must remain with you for the first 24 hours after your surgery if you receive anesthesia or medication. If you do not have someone to stay with you, your procedure may be cancelled.       If you have any other questions regarding your procedure or the day of surgery, please call [phone]      _________________________  ____________________________  Signature (Patient)              Signature (Provider) & date

## 2022-03-09 DIAGNOSIS — M25.371 INSTABILITY OF RIGHT ANKLE JOINT: Primary | ICD-10-CM

## 2022-03-09 LAB
EKG ATRIAL RATE: 61 BPM
EKG P AXIS: 47 DEGREES
EKG P-R INTERVAL: 156 MS
EKG Q-T INTERVAL: 414 MS
EKG QRS DURATION: 98 MS
EKG QTC CALCULATION (BAZETT): 416 MS
EKG R AXIS: 21 DEGREES
EKG T AXIS: 28 DEGREES
EKG VENTRICULAR RATE: 61 BPM

## 2022-03-17 ENCOUNTER — OFFICE VISIT (OUTPATIENT)
Dept: ORTHOPEDIC SURGERY | Age: 47
End: 2022-03-17
Payer: COMMERCIAL

## 2022-03-17 VITALS — HEIGHT: 65 IN | BODY MASS INDEX: 42.99 KG/M2 | WEIGHT: 258 LBS | RESPIRATION RATE: 16 BRPM

## 2022-03-17 DIAGNOSIS — M25.371 INSTABILITY OF RIGHT ANKLE JOINT: Primary | ICD-10-CM

## 2022-03-17 DIAGNOSIS — M72.2 PLANTAR FASCIITIS: ICD-10-CM

## 2022-03-17 DIAGNOSIS — M65.9 SYNOVITIS OF ANKLE: ICD-10-CM

## 2022-03-17 DIAGNOSIS — M24.573 EQUINUS CONTRACTURE OF ANKLE: ICD-10-CM

## 2022-03-17 PROCEDURE — 99213 OFFICE O/P EST LOW 20 MIN: CPT | Performed by: ORTHOPAEDIC SURGERY

## 2022-03-17 NOTE — PROGRESS NOTES
[address] AND SPORTS MEDICINE  [facility] Mike Laughlin  [address] [address]  Dept: [phone]    Ambulatory Orthopedic Consult      CHIEF COMPLAINT:    Chief Complaint   Patient presents with    Ankle Pain     Right       HISTORY OF PRESENT ILLNESS:      The patient is a 42 y.o. female who is being seen for evaluation of the above, which began around 7/2/21 atraumatically. At today's visit, she is using no brace/assistive device. History is obtained today from:   [x]  the patient     [x]  EMR     []  one family member/friend    []  multiple family members/friends    []  other:      She reports that she has previously seen a podiatrist for this problem Yuliya Silver). She localizes her pain to her right plantar heel and medial hindfoot. INTERVAL HISTORY 12/28/2021:  She is seen again today in the office for follow up of a previous issue (as above). Since being seen last, the patient is doing about the same overall. At today's visit, she is not using a brace or assistive device. History is obtained today from:   [x]  the patient     []  EMR     []  one family member/friend    []  multiple family members/friends    []  other:      INTERVAL HISTORY 2/8/2022:  She is seen again today in the office for follow up of a previous issue (as above). Since being seen last, the patient is doing worse. At today's visit, she is not using a brace or assistive device. History is obtained today from:   [x]  the patient     []  EMR     []  one family member/friend    []  multiple family members/friends    []  other:      She is referred here today from physical therapy, as she reports a painful \"popping\" in her subfibular region. She localizes most of her pain to her plantar heel. INTERVAL HISTORY 2/24/2022:  She is seen again today in the office for follow up of imaging as below. Since being seen last, the patient is doing worse. At today's visit, she is using no brace/assistive device. History is obtained today from:   [x]  the patient     [x]  EMR     [x]  one family member/friend    []  multiple family members/friends    []  other:      She reports that she rolls her ankle on a daily basis. INTERVAL HISTORY 3/17/2022:  She is seen again today in the office for follow up of a previous issue (as above). Since being seen last, she reports the problem has not significantly improved, and she reports that she is doing worse overall. At today's visit, she is using no brace/assistive device. She is here today to discuss surgery, and reports that she wishes to proceed with surgery in the near future. She denies any other clinically significant changes in history. History is obtained today from:   [x]  the patient     []  EMR     []  one family member/friend    []  multiple family members/friends    []  other:          REVIEW OF SYSTEMS:  Musculoskeletal: See HPI for pertinent positives     Past Medical History:    She  has a past medical history of Accessory ureter, Arthritis, and Kidney stone. Past Surgical History:    She  has a past surgical history that includes Lithotripsy; Hysterectomy; and  section. Current Medications:     Current Outpatient Medications:     Handicap Placard MISC, by Does not apply route 3/10/2022-2022, Disp: 1 each, Rfl: 0    naproxen (EC NAPROSYN) 500 MG EC tablet, Take 1 tablet by mouth 2 times daily as needed for Pain, Disp: 60 tablet, Rfl: 0     Allergies:    Patient has no known allergies. Family History:  family history is not on file. Social History:   Social History     Occupational History    Not on file   Tobacco Use    Smoking status: Former Smoker     Packs/day: 0.50     Quit date: 3/4/2022     Years since quittin.0    Smokeless tobacco: Never Used   Vaping Use    Vaping Use: Never used   Substance and Sexual Activity    Alcohol use:  Yes     Alcohol/week: 10.0 standard drinks     Types: 10 Cans of beer per week    Drug use: Never    Sexual activity: Not on file     Customer service full-time    OBJECTIVE:  Resp 16   Ht 5' 5\" (1.651 m)   Wt 258 lb (117 kg)   BMI 42.93 kg/m²    Psych: awake, alert  Cardio:  well perfused extremities, no cyanosis  Resp:  normal respiratory effort  Musculoskeletal:    RLE:  Vascular: Limb well perfused, compartments soft/compressible. Skin: No erythema/ulcers. Intact. Neurovascular Status:  Grossly neurovascularly intact throughout   Tenderness to Palpation: Plantar heel, lateral hindfoot/subfibular region, minimal tenderness along the posterior tibial tendon  -Pain with resisted inversion  -Pes planus   -Significant gastroc equinus  -Instability:  Talar tilt: Mildly positive (varus hindfoot axis); Anterior drawer: Equivocal  -No peroneal subluxation/dislocation with dorsiflexion + eversion or with circumduction  -Negative squeeze test of the calcaneus      LLE:  Vascular: Limb well perfused, compartments soft/compressible. Skin: No erythema/ulcers. Intact. Neurovascular Status:  Grossly neurovascularly intact throughout   Tenderness to Palpation:   -Pes planus, equinus      RADIOLOGY:   3/17/2022 No new radiology images today. Prior images reviewed for reference. MRI images and radiology report reviewed, as below:    1. Moderate plantar calcaneal spur.  Moderate central cord plantar fasciitis   with low-grade interstitial partial-thickness tearing at the origin central   cord plantar fascia. 2. Mild-to-moderate diffuse degenerative changes as above. 3. Small posterior subtalar and dorsal talonavicular joint effusions.  Mild   edema in the subcutaneous fat about the ankle. 4. No acute ligamentous injury.            FINDINGS:  Three weightbearing views (AP, Mortise, and Lateral) of the right ankle and three weightbearing views (AP, Oblique, Lateral) of the right foot were obtained in the office today and reviewed, reports that she wishes to proceed with surgery in the near future. The following outside medical records/clearances were reviewed at this visit:  Physical therapy DME eval, PCP Medical clearance and preop labs. We have discussed the patient's history of tobacco use, and that tobacco use puts her at higher risk for complications. We have discussed the risks of wound issues/infection, delayed healing/nonunion, and DVT/pulmonary embolus in the setting of tobacco use. I did recommend tobacco cessation, and verbal understanding was expressed. Orders/referrals were placed as below at today's visit. We spoke about the risks/benefits/alternatives to a surgical intervention. They understand that the risks of surgery may include but are not limited to pain, infection, bleeding, blood clot, damage to soft tissue/vessel/nerve, future surgery, scarring/stiffness, decreased strength/weakness, cosmetic deformity, neuroma/neuritis/phantom pains, delayed soft tissue/bone healing, nonunion, malunion, failure of hardware/fixation/surgery, iatrogenic fracture/dislocation, damage to bone/joint(s), worsening of condition, recurrence, limb length discrepancy, avascular necrosis of bone, neurovascular compromise/compartment syndrome, tourniquet complications, failure of surgery, dissatisfaction with outcome, loss of limb, stroke, heart attack, pulmonary embolus, mental status change, anesthesia risks/reaction, and even death. They expressed verbal understanding of the risks and wish to proceed with surgical intervention. All questions were answered. No guarantees were made or implied. Informed consent was obtained. We also had a discussion about the risk of blood clot and thromboembolic events.  The patient understands that there is an increased risk with surgery/immobilization, and understands nothing will completely eliminate the risk of DVT/PE's, and that any prophylactic medication does not substitute for early mobilization. Given her risk profile, I have recommended the following strategy to decrease the risk of blood clots, and the patient agrees and wishes to proceed:  Eliquis 2.5 mg PO q BID. All questions were answered and the patient agrees with the above plan. The patient will return to clinic postoperatively. At the patient's next visit, depending on how the patient is doing and/or new imaging/labs results, we may consider the following options:    []  Lace up ankle     []  CAM boot         []  removable wrist brace     []  PT:        []  Wean out immobilization         []  Adv activity      []  Footmind        []  Spenco       []  Custom Orthotic:               []  AZ brace                    []  Rocker Bottom      []  Night splint    []  Heel cups        []  Strap        []  Toe gizmos    []  Topl        []  NSAIDs         []  Mary        []  Ref:         []  Stress Xray    []  CT        []  MRI  []  Inj:          []  Consider OR      []  Pick OR date    No follow-ups on file. No orders of the defined types were placed in this encounter. No orders of the defined types were placed in this encounter. Mariluz Tang MD  Orthopedic Surgery        Please excuse any typos/errors, as this note was created with the assistance of voice recognition software. While intending to generate a document that actually reflects the content of the visit, the document can still have some errors including those of syntax and sound-a-like substitutions which may escape proof reading. In such instances, actual meaning can be extrapolated by context.

## 2022-03-18 ENCOUNTER — HOSPITAL ENCOUNTER (OUTPATIENT)
Age: 47
Setting detail: OUTPATIENT SURGERY
Discharge: HOME | End: 2022-03-18
Attending: ORTHOPAEDIC SURGERY | Admitting: ORTHOPAEDIC SURGERY
Payer: COMMERCIAL

## 2022-03-18 ENCOUNTER — APPOINTMENT (OUTPATIENT)
Dept: GENERAL RADIOLOGY | Age: 47
End: 2022-03-18
Attending: ORTHOPAEDIC SURGERY
Payer: COMMERCIAL

## 2022-03-18 VITALS
SYSTOLIC BLOOD PRESSURE: 112 MMHG | WEIGHT: 258.82 LBS | DIASTOLIC BLOOD PRESSURE: 79 MMHG | HEIGHT: 65 IN | HEART RATE: 53 BPM | OXYGEN SATURATION: 92 % | RESPIRATION RATE: 17 BRPM | TEMPERATURE: 97.2 F | BODY MASS INDEX: 43.12 KG/M2

## 2022-03-18 VITALS — DIASTOLIC BLOOD PRESSURE: 59 MMHG | TEMPERATURE: 95.9 F | SYSTOLIC BLOOD PRESSURE: 108 MMHG | OXYGEN SATURATION: 96 %

## 2022-03-18 DIAGNOSIS — M25.371 INSTABILITY OF RIGHT ANKLE JOINT: Primary | ICD-10-CM

## 2022-03-18 PROCEDURE — 64445 NJX AA&/STRD SCIATIC NRV IMG: CPT | Performed by: ANESTHESIOLOGY

## 2022-03-18 PROCEDURE — 6370000000 HC RX 637 (ALT 250 FOR IP): Performed by: ANESTHESIOLOGY

## 2022-03-18 PROCEDURE — 3600000003 HC SURGERY LEVEL 3 BASE: Performed by: ORTHOPAEDIC SURGERY

## 2022-03-18 PROCEDURE — 7100000000 HC PACU RECOVERY - FIRST 15 MIN: Performed by: ORTHOPAEDIC SURGERY

## 2022-03-18 PROCEDURE — 2709999900 HC NON-CHARGEABLE SUPPLY: Performed by: ORTHOPAEDIC SURGERY

## 2022-03-18 PROCEDURE — 27698 REPAIR OF ANKLE LIGAMENT: CPT | Performed by: ORTHOPAEDIC SURGERY

## 2022-03-18 PROCEDURE — 6360000002 HC RX W HCPCS: Performed by: ORTHOPAEDIC SURGERY

## 2022-03-18 PROCEDURE — 6360000002 HC RX W HCPCS: Performed by: ANESTHESIOLOGY

## 2022-03-18 PROCEDURE — 7100000001 HC PACU RECOVERY - ADDTL 15 MIN: Performed by: ORTHOPAEDIC SURGERY

## 2022-03-18 PROCEDURE — 3700000001 HC ADD 15 MINUTES (ANESTHESIA): Performed by: ORTHOPAEDIC SURGERY

## 2022-03-18 PROCEDURE — 7100000010 HC PHASE II RECOVERY - FIRST 15 MIN: Performed by: ORTHOPAEDIC SURGERY

## 2022-03-18 PROCEDURE — 2580000003 HC RX 258: Performed by: ORTHOPAEDIC SURGERY

## 2022-03-18 PROCEDURE — 6370000000 HC RX 637 (ALT 250 FOR IP): Performed by: ORTHOPAEDIC SURGERY

## 2022-03-18 PROCEDURE — 2580000003 HC RX 258: Performed by: ANESTHESIOLOGY

## 2022-03-18 PROCEDURE — 6360000002 HC RX W HCPCS: Performed by: NURSE ANESTHETIST, CERTIFIED REGISTERED

## 2022-03-18 PROCEDURE — 3700000000 HC ANESTHESIA ATTENDED CARE: Performed by: ORTHOPAEDIC SURGERY

## 2022-03-18 PROCEDURE — 3600000013 HC SURGERY LEVEL 3 ADDTL 15MIN: Performed by: ORTHOPAEDIC SURGERY

## 2022-03-18 PROCEDURE — 2500000003 HC RX 250 WO HCPCS: Performed by: ANESTHESIOLOGY

## 2022-03-18 PROCEDURE — 7100000011 HC PHASE II RECOVERY - ADDTL 15 MIN: Performed by: ORTHOPAEDIC SURGERY

## 2022-03-18 PROCEDURE — 2500000003 HC RX 250 WO HCPCS: Performed by: NURSE ANESTHETIST, CERTIFIED REGISTERED

## 2022-03-18 PROCEDURE — 29898 ANKLE ARTHROSCOPY/SURGERY: CPT | Performed by: ORTHOPAEDIC SURGERY

## 2022-03-18 PROCEDURE — 2720000010 HC SURG SUPPLY STERILE: Performed by: ORTHOPAEDIC SURGERY

## 2022-03-18 PROCEDURE — 27687 REVISION OF CALF TENDON: CPT | Performed by: ORTHOPAEDIC SURGERY

## 2022-03-18 PROCEDURE — 2500000003 HC RX 250 WO HCPCS: Performed by: ORTHOPAEDIC SURGERY

## 2022-03-18 PROCEDURE — C1713 ANCHOR/SCREW BN/BN,TIS/BN: HCPCS | Performed by: ORTHOPAEDIC SURGERY

## 2022-03-18 DEVICE — ANCHOR SUT NDL DX FIBERTAK: Type: IMPLANTABLE DEVICE | Site: ANKLE | Status: FUNCTIONAL

## 2022-03-18 DEVICE — DEVICE GRFT FIX FOR LIGMNT AUG ANCHR REP PEEK INT BRAC: Type: IMPLANTABLE DEVICE | Site: ANKLE | Status: FUNCTIONAL

## 2022-03-18 RX ORDER — PROPOFOL 10 MG/ML
INJECTION, EMULSION INTRAVENOUS PRN
Status: DISCONTINUED | OUTPATIENT
Start: 2022-03-18 | End: 2022-03-18 | Stop reason: SDUPTHER

## 2022-03-18 RX ORDER — OXYCODONE HYDROCHLORIDE AND ACETAMINOPHEN 5; 325 MG/1; MG/1
1 TABLET ORAL EVERY 6 HOURS PRN
Qty: 20 TABLET | Refills: 0 | Status: SHIPPED | OUTPATIENT
Start: 2022-03-18 | End: 2022-03-25

## 2022-03-18 RX ORDER — DIPHENHYDRAMINE HYDROCHLORIDE 50 MG/ML
12.5 INJECTION INTRAMUSCULAR; INTRAVENOUS EVERY 6 HOURS PRN
Status: DISCONTINUED | OUTPATIENT
Start: 2022-03-18 | End: 2022-03-18 | Stop reason: HOSPADM

## 2022-03-18 RX ORDER — SODIUM CHLORIDE, SODIUM LACTATE, POTASSIUM CHLORIDE, CALCIUM CHLORIDE 600; 310; 30; 20 MG/100ML; MG/100ML; MG/100ML; MG/100ML
INJECTION, SOLUTION INTRAVENOUS CONTINUOUS
Status: DISCONTINUED | OUTPATIENT
Start: 2022-03-19 | End: 2022-03-18 | Stop reason: HOSPADM

## 2022-03-18 RX ORDER — FENTANYL CITRATE 50 UG/ML
25 INJECTION, SOLUTION INTRAMUSCULAR; INTRAVENOUS EVERY 5 MIN PRN
Status: DISCONTINUED | OUTPATIENT
Start: 2022-03-18 | End: 2022-03-18 | Stop reason: HOSPADM

## 2022-03-18 RX ORDER — ONDANSETRON 2 MG/ML
4 INJECTION INTRAMUSCULAR; INTRAVENOUS
Status: COMPLETED | OUTPATIENT
Start: 2022-03-18 | End: 2022-03-18

## 2022-03-18 RX ORDER — DEXAMETHASONE SODIUM PHOSPHATE 10 MG/ML
INJECTION INTRAMUSCULAR; INTRAVENOUS PRN
Status: DISCONTINUED | OUTPATIENT
Start: 2022-03-18 | End: 2022-03-18 | Stop reason: SDUPTHER

## 2022-03-18 RX ORDER — DOCUSATE SODIUM 100 MG/1
100 CAPSULE, LIQUID FILLED ORAL 2 TIMES DAILY PRN
Qty: 20 CAPSULE | Refills: 0 | Status: SHIPPED | OUTPATIENT
Start: 2022-03-18 | End: 2022-03-25

## 2022-03-18 RX ORDER — FENTANYL CITRATE 50 UG/ML
INJECTION, SOLUTION INTRAMUSCULAR; INTRAVENOUS PRN
Status: DISCONTINUED | OUTPATIENT
Start: 2022-03-18 | End: 2022-03-18 | Stop reason: SDUPTHER

## 2022-03-18 RX ORDER — BUPIVACAINE HYDROCHLORIDE 5 MG/ML
INJECTION, SOLUTION EPIDURAL; INTRACAUDAL
Status: COMPLETED | OUTPATIENT
Start: 2022-03-18 | End: 2022-03-18

## 2022-03-18 RX ORDER — MIDAZOLAM HYDROCHLORIDE 1 MG/ML
2 INJECTION INTRAMUSCULAR; INTRAVENOUS ONCE
Status: COMPLETED | OUTPATIENT
Start: 2022-03-18 | End: 2022-03-18

## 2022-03-18 RX ORDER — GINSENG 100 MG
CAPSULE ORAL PRN
Status: DISCONTINUED | OUTPATIENT
Start: 2022-03-18 | End: 2022-03-18 | Stop reason: ALTCHOICE

## 2022-03-18 RX ORDER — KETOROLAC TROMETHAMINE 30 MG/ML
INJECTION, SOLUTION INTRAMUSCULAR; INTRAVENOUS PRN
Status: DISCONTINUED | OUTPATIENT
Start: 2022-03-18 | End: 2022-03-18 | Stop reason: SDUPTHER

## 2022-03-18 RX ORDER — LIDOCAINE HYDROCHLORIDE 20 MG/ML
INJECTION, SOLUTION EPIDURAL; INFILTRATION; INTRACAUDAL; PERINEURAL PRN
Status: DISCONTINUED | OUTPATIENT
Start: 2022-03-18 | End: 2022-03-18 | Stop reason: SDUPTHER

## 2022-03-18 RX ORDER — LIDOCAINE HYDROCHLORIDE 10 MG/ML
1 INJECTION, SOLUTION EPIDURAL; INFILTRATION; INTRACAUDAL; PERINEURAL
Status: DISCONTINUED | OUTPATIENT
Start: 2022-03-19 | End: 2022-03-18 | Stop reason: HOSPADM

## 2022-03-18 RX ORDER — ACETAMINOPHEN 500 MG
1000 TABLET ORAL ONCE
Status: COMPLETED | OUTPATIENT
Start: 2022-03-18 | End: 2022-03-18

## 2022-03-18 RX ORDER — METOCLOPRAMIDE HYDROCHLORIDE 5 MG/ML
10 INJECTION INTRAMUSCULAR; INTRAVENOUS EVERY 6 HOURS
Status: DISCONTINUED | OUTPATIENT
Start: 2022-03-18 | End: 2022-03-18 | Stop reason: HOSPADM

## 2022-03-18 RX ORDER — FENTANYL CITRATE 50 UG/ML
50 INJECTION, SOLUTION INTRAMUSCULAR; INTRAVENOUS EVERY 5 MIN PRN
Status: DISCONTINUED | OUTPATIENT
Start: 2022-03-18 | End: 2022-03-18 | Stop reason: HOSPADM

## 2022-03-18 RX ORDER — ONDANSETRON 4 MG/1
4 TABLET, FILM COATED ORAL EVERY 8 HOURS PRN
Qty: 20 TABLET | Refills: 0 | Status: SHIPPED | OUTPATIENT
Start: 2022-03-18 | End: 2022-03-25

## 2022-03-18 RX ORDER — ONDANSETRON 2 MG/ML
INJECTION INTRAMUSCULAR; INTRAVENOUS PRN
Status: DISCONTINUED | OUTPATIENT
Start: 2022-03-18 | End: 2022-03-18 | Stop reason: SDUPTHER

## 2022-03-18 RX ORDER — SULFAMETHOXAZOLE AND TRIMETHOPRIM 800; 160 MG/1; MG/1
1 TABLET ORAL 2 TIMES DAILY
Qty: 10 TABLET | Refills: 0 | Status: SHIPPED | OUTPATIENT
Start: 2022-03-18 | End: 2022-03-23

## 2022-03-18 RX ADMIN — CEFAZOLIN 2000 MG: 10 INJECTION, POWDER, FOR SOLUTION INTRAVENOUS at 07:35

## 2022-03-18 RX ADMIN — Medication 100 MCG: at 07:25

## 2022-03-18 RX ADMIN — SODIUM CHLORIDE, POTASSIUM CHLORIDE, SODIUM LACTATE AND CALCIUM CHLORIDE: 600; 310; 30; 20 INJECTION, SOLUTION INTRAVENOUS at 07:20

## 2022-03-18 RX ADMIN — MIDAZOLAM 2 MG: 1 INJECTION INTRAMUSCULAR; INTRAVENOUS at 07:08

## 2022-03-18 RX ADMIN — ONDANSETRON 4 MG: 2 INJECTION INTRAMUSCULAR; INTRAVENOUS at 10:15

## 2022-03-18 RX ADMIN — PROPOFOL 170 MG: 10 INJECTION, EMULSION INTRAVENOUS at 07:25

## 2022-03-18 RX ADMIN — DEXAMETHASONE SODIUM PHOSPHATE 10 MG: 10 INJECTION INTRAMUSCULAR; INTRAVENOUS at 07:37

## 2022-03-18 RX ADMIN — FENTANYL CITRATE 50 MCG: 50 INJECTION INTRAMUSCULAR; INTRAVENOUS at 09:37

## 2022-03-18 RX ADMIN — FENTANYL CITRATE 50 MCG: 50 INJECTION INTRAMUSCULAR; INTRAVENOUS at 10:07

## 2022-03-18 RX ADMIN — Medication 25 MCG: at 08:07

## 2022-03-18 RX ADMIN — BUPIVACAINE HYDROCHLORIDE 30 ML: 5 INJECTION, SOLUTION EPIDURAL; INTRACAUDAL at 07:15

## 2022-03-18 RX ADMIN — Medication 75 MCG: at 09:08

## 2022-03-18 RX ADMIN — ACETAMINOPHEN 1000 MG: 500 TABLET ORAL at 07:15

## 2022-03-18 RX ADMIN — METOCLOPRAMIDE HYDROCHLORIDE 10 MG: 5 INJECTION INTRAMUSCULAR; INTRAVENOUS at 10:57

## 2022-03-18 RX ADMIN — KETOROLAC TROMETHAMINE 15 MG: 30 INJECTION, SOLUTION INTRAMUSCULAR; INTRAVENOUS at 09:05

## 2022-03-18 RX ADMIN — DIPHENHYDRAMINE HYDROCHLORIDE 12.5 MG: 50 INJECTION, SOLUTION INTRAMUSCULAR; INTRAVENOUS at 11:01

## 2022-03-18 RX ADMIN — ONDANSETRON 4 MG: 2 INJECTION INTRAMUSCULAR; INTRAVENOUS at 07:39

## 2022-03-18 RX ADMIN — LIDOCAINE HYDROCHLORIDE 80 MG: 20 INJECTION, SOLUTION EPIDURAL; INFILTRATION; INTRACAUDAL; PERINEURAL at 07:25

## 2022-03-18 ASSESSMENT — PULMONARY FUNCTION TESTS
PIF_VALUE: 1
PIF_VALUE: 20
PIF_VALUE: 18
PIF_VALUE: 18
PIF_VALUE: 19
PIF_VALUE: 18
PIF_VALUE: 4
PIF_VALUE: 18
PIF_VALUE: 1
PIF_VALUE: 18
PIF_VALUE: 19
PIF_VALUE: 3
PIF_VALUE: 18
PIF_VALUE: 19
PIF_VALUE: 18
PIF_VALUE: 19
PIF_VALUE: 18
PIF_VALUE: 1
PIF_VALUE: 18
PIF_VALUE: 1
PIF_VALUE: 18
PIF_VALUE: 19
PIF_VALUE: 19
PIF_VALUE: 4
PIF_VALUE: 18
PIF_VALUE: 20
PIF_VALUE: 18
PIF_VALUE: 15
PIF_VALUE: 20
PIF_VALUE: 18
PIF_VALUE: 19
PIF_VALUE: 18
PIF_VALUE: 0
PIF_VALUE: 18
PIF_VALUE: 18
PIF_VALUE: 11
PIF_VALUE: 18
PIF_VALUE: 20
PIF_VALUE: 19
PIF_VALUE: 18
PIF_VALUE: 2
PIF_VALUE: 18
PIF_VALUE: 19
PIF_VALUE: 18
PIF_VALUE: 19
PIF_VALUE: 20
PIF_VALUE: 18
PIF_VALUE: 2
PIF_VALUE: 19
PIF_VALUE: 18
PIF_VALUE: 1
PIF_VALUE: 18
PIF_VALUE: 19
PIF_VALUE: 18
PIF_VALUE: 17
PIF_VALUE: 18
PIF_VALUE: 20
PIF_VALUE: 1
PIF_VALUE: 18
PIF_VALUE: 19
PIF_VALUE: 1
PIF_VALUE: 18
PIF_VALUE: 7
PIF_VALUE: 19
PIF_VALUE: 18
PIF_VALUE: 20
PIF_VALUE: 18
PIF_VALUE: 18
PIF_VALUE: 19
PIF_VALUE: 18

## 2022-03-18 ASSESSMENT — PAIN DESCRIPTION - PAIN TYPE
TYPE: SURGICAL PAIN
TYPE: SURGICAL PAIN

## 2022-03-18 ASSESSMENT — PAIN SCALES - GENERAL
PAINLEVEL_OUTOF10: 4
PAINLEVEL_OUTOF10: 8
PAINLEVEL_OUTOF10: 4
PAINLEVEL_OUTOF10: 8
PAINLEVEL_OUTOF10: 3

## 2022-03-18 ASSESSMENT — PAIN - FUNCTIONAL ASSESSMENT: PAIN_FUNCTIONAL_ASSESSMENT: 0-10

## 2022-03-18 ASSESSMENT — PAIN DESCRIPTION - DESCRIPTORS: DESCRIPTORS: SHOOTING

## 2022-03-18 ASSESSMENT — PAIN DESCRIPTION - LOCATION
LOCATION: ANKLE
LOCATION: ANKLE

## 2022-03-18 NOTE — H&P
Interval H&P Note    Pt Name: Reno Thorpe  MRN: [id number]  YOB: 1975  Date of evaluation: 3/18/2022      [x] I have reviewed in [facility] the Orthopedic Progress Note by Dr Beverly Sexton dated 3/17/22 attached below for an Interval History and Physical note. [x] I have examined  Adilson Hernandez  There are no changes to the patient who is scheduled for a right ankle lateral ligament stabilization with gastroc - Arthrex by Dr Dilia Ramos for right ankle instability. The patient denies new health changes, fever, chills, wheezing, cough, increased SOB, chest pain, open sores or wounds. No DM  Last naprosyn 3/11/22    PMH, Surgical History, Social History, Psych, and Family History reviewed and updated in EPIC in appropriate section. Vital signs: /79   Pulse 76   Temp 97.5 °F (36.4 °C) (Temporal)   Resp 20   SpO2 96%     Allergies:  Patient has no known allergies. Medications:    Prior to Admission medications    Medication Sig Start Date End Date Taking? Authorizing Provider   Handicap Placard MISC by Does not apply route 3/10/2022-6/30/2022 3/9/22   Nicholas Rodriguez MD   naproxen (EC NAPROSYN) 500 MG EC tablet Take 1 tablet by mouth 2 times daily as needed for Pain 12/2/21   Nicholas Rodriguez MD         This is a 42 y.o.morbidly obese female who is pleasant, cooperative, alert and oriented x3, in no acute distress    Physical Exam:  Lungs: Bilateral equal air entry, unlabored at rest w/o use of accessory muscles, no wheezing, rales or rhonchi, normal effort  Cardiovascular: HR 76 normal rate, regular rhythm, no murmur, gallop, rub. Abdomen: Soft, obese, nontender, nondistended, normal bowel sounds. Labs:  Recent Labs     03/08/22  1323   HGB 14.5   HCT 44.0   WBC 6.8   MCV 92.6          No results for input(s): COVID19 in the last 720 hours.     CAYETANO Gonzales CNP   Electronically signed 3/18/2022 at 6:05 AM        Nicholas Rodriguez MD   Physician   Specialty: Orthopedic Surgery   Progress Notes      Signed   Encounter Date:  3/17/2022         Related encounter: Office Visit from 3/17/2022 in [address] [place] [address] and Sports Medicine           Signed        Expand All Collapse All        [address] [place] [address] AND SPORTS MEDICINE  [address] [address]  Dept: [phone]     Ambulatory Orthopedic Consult        CHIEF COMPLAINT:         Chief Complaint   Patient presents with    Ankle Pain       Right         HISTORY OF PRESENT ILLNESS:       The patient is a 42 y.o. female who is being seen for evaluation of the above, which began around 7/2/21 atraumatically. At today's visit, she is using no brace/assistive device. History is obtained today from:   [x]?  the patient     [x]? EMR     []?  one family member/friend    []?  multiple family members/friends    []? other:       She reports that she has previously seen a podiatrist for this problem Lenz Elvin). She localizes her pain to her right plantar heel and medial hindfoot. INTERVAL HISTORY 12/28/2021:  She is seen again today in the office for follow up of a previous issue (as above). Since being seen last, the patient is doing about the same overall. At today's visit, she is not using a brace or assistive device. History is obtained today from:   [x]?  the patient     []? EMR     []?  one family member/friend    []?  multiple family members/friends    []? other:       INTERVAL HISTORY 2/8/2022:  She is seen again today in the office for follow up of a previous issue (as above). Since being seen last, the patient is doing worse. At today's visit, she is not using a brace or assistive device. History is obtained today from:   [x]?  the patient     []? EMR     []?  one family member/friend    []?  multiple family members/friends    []?   other:       She is referred here today from physical therapy, as she reports a painful \"popping\" in her subfibular region. She localizes most of her pain to her plantar heel. INTERVAL HISTORY 2022:  She is seen again today in the office for follow up of imaging as below. Since being seen last, the patient is doing worse. At today's visit, she is using no brace/assistive device. History is obtained today from:   [x]?  the patient     [x]? EMR     [x]?  one family member/friend    []?  multiple family members/friends    []? other:       She reports that she rolls her ankle on a daily basis. INTERVAL HISTORY 3/17/2022:  She is seen again today in the office for follow up of a previous issue (as above). Since being seen last, she reports the problem has not significantly improved, and she reports that she is doing worse overall. At today's visit, she is using no brace/assistive device. She is here today to discuss surgery, and reports that she wishes to proceed with surgery in the near future. She denies any other clinically significant changes in history. History is obtained today from:   [x]?  the patient     []? EMR     []?  one family member/friend    []?  multiple family members/friends    []? other:             REVIEW OF SYSTEMS:  Musculoskeletal: See HPI for pertinent positives     Past Medical History:    She   Past Medical History in prose (no negatives)    has a past medical history of Accessory ureter, Arthritis, and Kidney stone. Past Surgical History:    She  has a past surgical history that includes Lithotripsy; Hysterectomy; and  section. Current Medications:     Current Medication      Current Outpatient Medications:     Handicap Placard MISC, by Does not apply route 3/10/2022-2022, Disp: 1 each, Rfl: 0    naproxen (EC NAPROSYN) 500 MG EC tablet, Take 1 tablet by mouth 2 times daily as needed for Pain, Disp: 60 tablet, Rfl: 0         Allergies:    Patient has no known allergies.      Family History:  family history is not on file. Social History:   Social History            Occupational History    Not on file   Tobacco Use    Smoking status: Former Smoker       Packs/day: 0.50       Quit date: 3/4/2022       Years since quittin.0    Smokeless tobacco: Never Used   Vaping Use    Vaping Use: Never used   Substance and Sexual Activity    Alcohol use: Yes       Alcohol/week: 10.0 standard drinks       Types: 10 Cans of beer per week    Drug use: Never    Sexual activity: Not on file      Customer service full-time     OBJECTIVE:  Resp 16   Ht 5' 5\" (1.651 m)   Wt 258 lb (117 kg)   BMI 42.93 kg/m²    Psych: awake, alert  Cardio:  well perfused extremities, no cyanosis  Resp:  normal respiratory effort  Musculoskeletal:    RLE:  Vascular: Limb well perfused, compartments soft/compressible. Skin: No erythema/ulcers. Intact. Neurovascular Status:  Grossly neurovascularly intact throughout   Tenderness to Palpation: Plantar heel, lateral hindfoot/subfibular region, minimal tenderness along the posterior tibial tendon  -Pain with resisted inversion  -Pes planus   -Significant gastroc equinus  -Instability:  Talar tilt: Mildly positive (varus hindfoot axis); Anterior drawer: Equivocal  -No peroneal subluxation/dislocation with dorsiflexion + eversion or with circumduction  -Negative squeeze test of the calcaneus        LLE:  Vascular: Limb well perfused, compartments soft/compressible. Skin: No erythema/ulcers. Intact. Neurovascular Status:  Grossly neurovascularly intact throughout   Tenderness to Palpation:   -Pes planus, equinus        RADIOLOGY:   3/17/2022 No new radiology images today. Prior images reviewed for reference. MRI images and radiology report reviewed, as below:    1. Moderate plantar calcaneal spur. Moderate central cord plantar fasciitis   with low-grade interstitial partial-thickness tearing at the origin central   cord plantar fascia.    2. Mild-to-moderate diffuse degenerative changes as above. 3. Small posterior subtalar and dorsal talonavicular joint effusions. Mild   edema in the subcutaneous fat about the ankle. 4. No acute ligamentous injury. FINDINGS:  Three weightbearing views (AP, Mortise, and Lateral) of the right ankle and three weightbearing views (AP, Oblique, Lateral) of the right foot were obtained in the office today and reviewed, revealing no acute fracture, dislocation, or radioopaque foreign body/tumor. The ankle mortise is maintained with no widening of the clear spaces. Meary's angle is apex plantar, with widened talocalcaneal angle and talar head uncoverage. Degenerative changes diffusely throughout the midfoot, as well as at the subtalar joint, with joint space narrowing, sclerosis, and osteophytes. IMPRESSION: No acute fracture/dislocation. Pes planus. Electronically signed by Yahaira Marrero MD     Relevant previous imaging reviewed, both imaging and report(s) as below:    No results found. ASSESSMENT AND PLAN:  Body mass index is 42.93 kg/m². She has right ankle pain with a history of ankle instability (mildly positive talar tilt, equivocal anterior drawer, varus hindfoot axis; reports a history of rolling her ankle daily), with likely underlying ankle synovitis, as well as a component of plantar fasciitis with a gastroc equinus contracture. She also has a history of painful popping in the lateral hindfoot/ankle. She also has a history of right foot pain, secondary to posterior tibial tendinitis along with pes planus, as well as some degenerative changes of the subtalar joint (a right subtalar joint injection on 12/20/2021 helped her pain approximately 50% for about 3 days) and diffusely throughout the midfoot. Notably, she has the past medical history as above. She has a history of fibromyalgia, and tobacco use (reports she smokes 1/4 pack of cigarettes per day). We had a discussion today about the likely diagnosis and its natural history, physical exam and imaging findings, as well as various treatment options in detail. Surgically, we have discussed a right lateral ligamentous stabilization, with gastroc recession, and possible ankle arthroscopy with possible debridement. We have discussed the anticipated postoperative course and relevant weightbearing restrictions/immobilization. She reports that she wishes to proceed with surgery in the near future. The following outside medical records/clearances were reviewed at this visit:  Physical therapy DME eval, PCP Medical clearance and preop labs. We have discussed the patient's history of tobacco use, and that tobacco use puts her at higher risk for complications. We have discussed the risks of wound issues/infection, delayed healing/nonunion, and DVT/pulmonary embolus in the setting of tobacco use. I did recommend tobacco cessation, and verbal understanding was expressed. Orders/referrals were placed as below at today's visit. We spoke about the risks/benefits/alternatives to a surgical intervention. They understand that the risks of surgery may include but are not limited to pain, infection, bleeding, blood clot, damage to soft tissue/vessel/nerve, future surgery, scarring/stiffness, decreased strength/weakness, cosmetic deformity, neuroma/neuritis/phantom pains, delayed soft tissue/bone healing, nonunion, malunion, failure of hardware/fixation/surgery, iatrogenic fracture/dislocation, damage to bone/joint(s), worsening of condition, recurrence, limb length discrepancy, avascular necrosis of bone, neurovascular compromise/compartment syndrome, tourniquet complications, failure of surgery, dissatisfaction with outcome, loss of limb, stroke, heart attack, pulmonary embolus, mental status change, anesthesia risks/reaction, and even death.  They expressed verbal understanding of the risks and wish to proceed with surgical intervention. All questions were answered. No guarantees were made or implied. Informed consent was obtained. We also had a discussion about the risk of blood clot and thromboembolic events. The patient understands that there is an increased risk with surgery/immobilization, and understands nothing will completely eliminate the risk of DVT/PE's, and that any prophylactic medication does not substitute for early mobilization. Given her risk profile, I have recommended the following strategy to decrease the risk of blood clots, and the patient agrees and wishes to proceed:  Eliquis 2.5 mg PO q BID. All questions were answered and the patient agrees with the above plan. The patient will return to clinic postoperatively. At the patient's next visit, depending on how the patient is doing and/or new imaging/labs results, we may consider the following options:    []? Lace up ankle     []? CAM boot         []?  removable wrist brace     []? PT:        []? Wean out immobilization         []? Adv activity       []? Footmind        []? Spenco       []? Custom Orthotic:               []?  AZ brace                    []?  Rocker Bottom       []? Night splint    []? Heel cups        []? Strap        []? Toe gizmos    []? Topl        []? NSAIDs         []? Mary        []? Ref:         []? Stress Xray    []?  CT        []? MRI          []? Inj:           []? Consider OR      []? Pick OR date     No follow-ups on file. Encounter Medications    No orders of the defined types were placed in this encounter. No orders of the defined types were placed in this encounter. Crystal Hammer MD  Orthopedic Surgery           Please excuse any typos/errors, as this note was created with the assistance of voice recognition software.  While intending to generate a document that actually reflects the content of the visit, the document can still have some errors including those of syntax and sound-a-like substitutions which may escape proof reading. In such instances, actual meaning can be extrapolated by context.

## 2022-03-18 NOTE — PROGRESS NOTES
I spoke to the patient before heading to the OR, and the operative site was identified, verified and marked. The procedure was verified. We have reviewed the risks, benefits, and alternatives to the procedure. No guarantees were made. I have also reviewed the history and physical. There are no significant changes in medical history. All questions were answered and they wish to proceed as planned.      Electronically signed by Yahaira Marrero MD

## 2022-03-18 NOTE — ANESTHESIA PRE PROCEDURE
Department of Anesthesiology  Preprocedure Note       Name:  Raymundo Flaherty   Age:  42 y.o.  :  1975                                          MRN:  [id number]         Date:  3/18/2022      Surgeon: Shine Osorio):  Josue Hathaway MD    Procedure: Procedure(s):  RIGHT ANKLE LATERAL LIGAMENT  STABILIZATION  WITH GASTROC-  [address]  RIGHT ANKLE ARTHROSCOPY    Medications prior to admission:   Prior to Admission medications    Medication Sig Start Date End Date Taking? Authorizing Provider   Handicap Placard MISC by Does not apply route 3/10/2022-2022  Patient not taking: Reported on 3/18/2022 3/9/22   Josue Hathaway MD   naproxen (EC NAPROSYN) 500 MG EC tablet Take 1 tablet by mouth 2 times daily as needed for Pain 21   Josue Hathaway MD       Current medications:    Current Facility-Administered Medications   Medication Dose Route Frequency Provider Last Rate Last Admin    [START ON 3/19/2022] lidocaine PF 1 % injection 1 mL  1 mL IntraDERmal Once PRN MD Mary Sorto [facility] ON 3/19/2022] lactated ringers infusion   IntraVENous Continuous Kae Pineda MD        acetaminophen (TYLENOL) tablet 1,000 mg  1,000 mg Oral Once Rosa Herzog MD        midazolam (VERSED) injection 2 mg  2 mg IntraVENous Once Kae Pineda MD        bupivacaine liposome (EXPAREL) 1.3 % injection 133 mg  10 mL SubCUTAneous Once Kae Pineda MD        ceFAZolin (ANCEF) 2000 mg in dextrose 5 % 50 mL IVPB  2,000 mg IntraVENous Once Josue Hathaway MD           Allergies:  No Known Allergies    Problem List:  There is no problem list on file for this patient.       Past Medical History:        Diagnosis Date    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    Arthritis     right foot    Kidney stone        Past Surgical History:        Procedure Laterality Date     SECTION      x 1     HYSTERECTOMY      LITHOTRIPSY         Social History:    Social History     Tobacco Use    Smoking status: Former Smoker     Packs/day: 0.50     Quit date: 3/4/2022     Years since quittin.0    Smokeless tobacco: Never Used   Substance Use Topics    Alcohol use: Yes     Alcohol/week: 10.0 standard drinks     Types: 10 Cans of beer per week                                Counseling given: Not Answered      Vital Signs (Current):   Vitals:    22 0553 22 0610 22 0708 22 0709   BP: 129/79  118/83 116/80   Pulse: 76  70 71   Resp: 20  13 13   Temp: 97.5 °F (36.4 °C)      TempSrc: Temporal      SpO2: 96%  98% 99%   Weight:  258 lb 13.1 oz (117.4 kg)     Height:  5' 5\" (1.651 m)                                                BP Readings from Last 3 Encounters:   22 116/80   22 (!) 149/72       NPO Status: Time of last liquid consumption:                         Time of last solid consumption:                         Date of last liquid consumption: 22                        Date of last solid food consumption: 22    BMI:   Wt Readings from Last 3 Encounters:   22 258 lb 13.1 oz (117.4 kg)   22 258 lb (117 kg)   22 258 lb 13.1 oz (117.4 kg)     Body mass index is 43.07 kg/m². CBC:   Lab Results   Component Value Date    WBC 6.8 2022    RBC 4.75 2022    HGB 14.5 2022    HCT 44.0 2022    MCV 92.6 2022    RDW 13.3 2022     2022       CMP: No results found for: NA, K, CL, CO2, BUN, CREATININE, GFRAA, AGRATIO, LABGLOM, GLUCOSE, GLU, PROT, CALCIUM, BILITOT, ALKPHOS, AST, ALT    POC Tests: No results for input(s): POCGLU, POCNA, POCK, POCCL, POCBUN, POCHEMO, POCHCT in the last 72 hours.     Coags: No results found for: PROTIME, INR, APTT    HCG (If Applicable): No results found for: PREGTESTUR, PREGSERUM, HCG, HCGQUANT     ABGs: No results found for: PHART, PO2ART, WCA5DFC, NLW4RMC, BEART, U9ANNXQE     Type & Screen (If Applicable):  No results found for: LABABO, LABRH    Drug/Infectious Status (If Applicable):  No results found for: HIV, HEPCAB    COVID-19 Screening (If Applicable): No results found for: COVID19        Anesthesia Evaluation    Airway: Mallampati: I  TM distance: >3 FB   Neck ROM: full  Mouth opening: > = 3 FB Dental:          Pulmonary:                              Cardiovascular:                      Neuro/Psych:               GI/Hepatic/Renal:             Endo/Other:                     Abdominal:             Vascular:           Other Findings:             Anesthesia Plan      general     ASA 3                                 Gillian Celaya MD   3/18/2022

## 2022-03-18 NOTE — ANESTHESIA PROCEDURE NOTES
Peripheral Block    Patient location during procedure: pre-op  Start time: 3/18/2022 7:05 AM  End time: 3/18/2022 7:10 AM  Staffing  Performed: anesthesiologist   Anesthesiologist: Valentine Hill MD  Preanesthetic Checklist  Completed: patient identified, IV checked, site marked, risks and benefits discussed, surgical consent, monitors and equipment checked, pre-op evaluation, timeout performed, anesthesia consent given, oxygen available and patient being monitored  Peripheral Block  Prep: ChloraPrep  Patient monitoring: cardiac monitor, continuous pulse ox, frequent blood pressure checks and IV access  Block type: Sciatic  Laterality: right  Injection technique: single-shot  Guidance: ultrasound guided  Local infiltration: lidocaine  Infiltration strength: 1 %  Dose: 3 mL  Popliteal  Provider prep: mask and sterile gloves  Local infiltration: lidocaine  Needle  Needle gauge: 21 G  Needle length: 10 cm  Needle localization: ultrasound guidance  Assessment  Injection assessment: negative aspiration for heme, no paresthesia on injection and local visualized surrounding nerve on ultrasound  Paresthesia pain: none  Slow fractionated injection: yes  Hemodynamics: stable  Additional Notes  U/S 45216.  (1) Under ultrasound guidance, a  gauge needle was inserted and placed in close proximity to the  nerve.  (2) Ultrasound was also used to visualize the spread of the anesthetic in close proximity to the nerve being blocked. (3) The nerve appeared anatomically normal, and (4 there were no apparent abnormal pathological findings on the image that were readily visible and related to the nerve being blocked. (5) A permanent ultrasound image was saved in the patient's record.             Medications Administered  Bupivacaine (MARCAINE) PF injection 0.5%, 30 mL  Reason for block: post-op pain management and at surgeon's request

## 2022-03-18 NOTE — ANESTHESIA POSTPROCEDURE EVALUATION
Department of Anesthesiology  Postprocedure Note    Patient: Mariajose Turner  MRN: [id number]  YOB: 1975  Date of evaluation: 3/18/2022  Time:  12:02 PM     Procedure Summary     Date: 03/18/22 Room / Location: [address] [place] [address] / [facility] - INPATIENT    Anesthesia Start: 0720 Anesthesia Stop: 6643    Procedures:       RIGHT ANKLE LATERAL LIGAMENT  STABILIZATION  WITH GASTROC-  [address] (Right Ankle)      RIGHT ANKLE ARTHROSCOPY (Right Ankle) Diagnosis: (DX RIGHT ANKLE INSTABILITY)    Surgeons: Allegra Lofton MD Responsible Provider: Robin Hunt MD    Anesthesia Type: general, regional ASA Status: 3          Anesthesia Type: general, regional    Osito Phase I:      Osito Phase II:      Last vitals: Reviewed and per EMR flowsheets.        Anesthesia Post Evaluation    Complications: no

## 2022-03-18 NOTE — OP NOTE
[place] [address]  Dept: [phone]: [phone]      Orthopedic Surgery Operative Report      Patient: Cristy Hutchinson      MRN#: [id number]     YOB: 1975      Date of Surgery: 3/18/2022    Attending Surgeon: Holden Eduardo M.D.   PCP: CAYETANO Lopez CNP        Preoperative Diagnosis:   1. Right ankle instability with recurrent sprains  2. Right ankle synovitis  3. Right gastrocnemius equinus contracture  4. Right foot plantar fasciitis  5. Tobacco use  6. Fibromyalgia  7. Body mass index is 43.07 kg/m². Postoperative Diagnosis:   1. Same     Procedures Performed:  (3/18/2022)  1. Right ankle lateral ligamentous repair   2. Right ankle arthroscopic debridement  3. Right leg gastroc recession, performed through separate incision      Implants:    Arthrex fiber tack anchor x2 and internal brace  Implant Name Type Inv. Item Serial No.  Lot No. LRB No. Used Action   DEVICE GRFT FIX FOR [facility] REP PEEK INT BRA - [id number]  DEVICE GRFT FIX FOR [address] [place] [address] [facility] P[facility] INT [facility]-WD [id number] Right 1 Implanted   ANCHOR SUT NDL DX [employer] - [id number]  ANCHOR SUT NDL DX [facility]-WD [id number] Right 1 Implanted   ANCHOR SUT NDL DX F[employer]ERTAK - [id number]  [facility]UT [facility]DL [facility] [id number] Right 1 Implanted         Attending Surgeon:     Khoi Mcnair MD      Assistant Surgeon:    Surgical Assistant: Phoenix Feliz  Resident: Susanna Lynn DO      Anesthesia:    General      Staff:  Surgeon(s):  Enrico Jaimes MD  Surgical Assistant: Phoenix Feliz  Scrub Person First: Kayla Guerline  Anesthesia: Vaelntine Hill MD      Estimated Blood Loss:    Minimal      Complications:    None      Specimen:    None      History:    Ms. Cristy Hutchinson is a 42 y.o. female who was seen recently for the above problem.      She has a history of right ankle pain with a history of ankle instability (mildly positive talar tilt, equivocal anterior drawer, varus hindfoot axis; reports a history of rolling her ankle daily), with underlying ankle synovitis, as well as a component of plantar fasciitis with a gastroc equinus contracture. She also has a history of painful popping in the lateral hindfoot/ankle.     She also has a history of right foot pain, secondary to posterior tibial tendinitis along with pes planus, as well as some degenerative changes of the subtalar joint (a right subtalar joint injection on 2021 helped her pain approximately 50% for about 3 days) and diffusely throughout the midfoot.       Notably, she has the past medical history as above. She has a history of fibromyalgia, and a history of tobacco use (1/4 pack of cigarettes per day). She is currently medically stable and appropriate for the planned procedure. We have spoken about the risks/benefits/alternatives to a surgical intervention, verbal understanding of these risks was expressed, and informed consent was obtained. All questions were answered. No guarantees were made or implied. I have answered all questions, and they wish to proceed with surgical intervention. Operative Note:    The patient was identified in the preoperative holding area by name, MRN, and . The surgical site was verified and marked according to AAOS guidelines. The patient was taken to the operating room and placed on the operating table in a supine position. After achieving adequate sedation by the anesthesia team, the patient was then carefully positioned and all bony prominences were then padded. A tourniquet was placed on the ipsilateral thigh, and then the operative extremity was prepped and draped in the usual sterile fashion. A timeout was held in which the patient's identity, surgical site, procedure, allergies, and antibiotics were verified, and all in the room were in agreement, and thus the procedure began.     The leg was exsanguinated to the level of the tourniquet, and the tourniquet was then elevated to 275 mm Hg. The total tourniquet time was 51 minutes. The procedure began with a gastroc release by making an approximately 6cm long incision at the gastrocnemius-soleus myotendinous junction about one fingerbreadth posterior to the edge of the posteromedial tibia, with the knee extended. Vessels were cauterized and the fascia was opened in line with the skin incision to expose the interval between the gastroc and the soleus, which was carefully developed with blunt dissection. Anterior dissection was carried out above the gastroc aponeurosis from medial to lateral, tunneling across to ensure the Sural nerve was not adherent. A long flat retractor was placed above the gastroc fascia to carefully protect the nerve, and then the gastroc aponeurosis was cut with a long handled scalpel under direct visualization. The contracture was released and the ends of the fascia were noted to separate instantly with substantial retraction from the tension. The wound was irrigated and closed in a layered fashion, closing the fascia first, followed by the skin. 10 cc (of a mixture of 20 cc of sterile saline + 5 cc of 1% lidocaine without epi) were injected into the tibiotalar joint. An anteromedial portal was established by sharply incising the skin and blunt soft tissue dissection was performed down to the capsule, which was opened and confirmed by a rush of saline. Under arthroscopic visualization, an anterolateral portal was established utilizing a similar technique along with an 18-gauge needle to verify the appropriate position and trajectory, taking care to protect the superficial peroneal nerve branches. The ankle joint was thoroughly inspected arthroscopically.   There was noted to be diffuse synovitis throughout the tibiotalar joint, as well as some articular cartilage degeneration and fraying, particularly at the all well-seated and the fixation was stable. Manual stress applied after the repair demonstrated a stable ankle. Copious sterile irrigation was used to rinse the operative sites, and a layered closure was performed using 0 Vicryl, 2-0 vicryl and 3-0 nylon, providing appropriate tension to the skin. The skin was cleaned and gently dried. Steri-Strips were then applied, and anti-bacterial ointment was applied on top of that, with Adaptic and fluffs. A sterile layer of cast padding was then applied. A short leg splint with the ankle at neutral was applied. The patient was aroused from anesthesia without complication, and gently transferred to the bed and then transported to the postoperative area in a stable condition. The patient was noted to have tolerated the procedure well without complication. Postoperative Plan:         Precautions:  nonweight bearing x 4 weeks anticipated on the Right  Right lower extremity   --At postop week 4, the patient may begin weightbearing as tolerated in the cam boot, and may begin active range of motion and gentle active assisted range of motion into plantarflexion and dorsiflexion, as well as eversion. No inversion until 12 weeks. May wean out of the cam boot into a lace up ankle brace at the 6-week yenny. Cam boot on 24 hours except for exercise until the 6-week yenny.              []  Physical Therapy/Home exercises       Immobilization:      [x]  Splint/Cast  []  CAM boot  []  Comfortable shoe    []  Other:      DVT ppx:   [x]  Early mobilization       [x]  Medication as prescribed (Eliquis)      []  No chemical ppx needed; mechanical only   -    Pain control:  Medication as prescribed (dosing and quantity) indicated for acute postoperative pain control   -    Special concerns:          [x]  Tobacco cessation     []          [x]  Avoid strenuous activity/pain provoking maneuvers and high-impact repetitive exercises    -    Disposition:   PACU      The patient has been instructed to follow up in the office. The particulars of surgery as well as the condition of the patient postoperatively were discussed with the patients family thereafter per the patient's wishes.            Dany Fraser MD  Orthopedic Surgery

## 2022-03-31 ENCOUNTER — OFFICE VISIT (OUTPATIENT)
Dept: ORTHOPEDIC SURGERY | Age: 47
End: 2022-03-31

## 2022-03-31 VITALS — WEIGHT: 258 LBS | BODY MASS INDEX: 42.99 KG/M2 | HEIGHT: 65 IN | RESPIRATION RATE: 16 BRPM

## 2022-03-31 DIAGNOSIS — M25.371 INSTABILITY OF RIGHT ANKLE JOINT: Primary | ICD-10-CM

## 2022-03-31 PROCEDURE — 99024 POSTOP FOLLOW-UP VISIT: CPT | Performed by: ORTHOPAEDIC SURGERY

## 2022-03-31 NOTE — PROGRESS NOTES
[address] [facility]  [address] [address]  Dept: [phone]    Ambulatory Orthopedic Postoperative Visit     Preoperative Diagnosis:   1. Right ankle instability with recurrent sprains  2. Right ankle synovitis  3. Right gastrocnemius equinus contracture  4. Right foot plantar fasciitis  5. Tobacco use  6. Fibromyalgia  7. Body mass index is 43.07 kg/m².     Postoperative Diagnosis:   1. Same      Procedures Performed:  (3/18/2022)  1. Right ankle lateral ligamentous repair   2. Right ankle arthroscopic debridement  3. Right leg gastroc recession, performed through separate incision         SUBJECTIVE:     The patient returns post op from the above stated procedure. Reports doing well overall, reports improved pain, denies wound drainage/issues, fevers/chills/night sweats, calf swelling/pain, chest pain, shortness of breath. OBJECTIVE:  Resp 16   Ht 5' 5\" (1.651 m)   Wt 258 lb (117 kg)   BMI 42.93 kg/m²    NAD, resting comfortably  Incisions clean/dry/intact, no erythema/dehiscence/drainage  Sensation to light touch grossly intact throughout slightly diminished throughout the medial hindfoot  Warm and well perfused  Grossly neurovascularly intact distally  No signs of infection  No unexpected calf swelling/tenderness      RADIOLOGY:   3/31/2022 No new radiology images today. Prior images reviewed for reference.       ASSESSMENT AND PLAN:     2 weeks s/p above, doing well overall      She has a history of right ankle pain with a history of ankle instability (mildly positive talar tilt, equivocal anterior drawer, varus hindfoot axis; reports a history of rolling her ankle daily), with underlying ankle synovitis, as well as a component of plantar fasciitis with a gastroc equinus contracture.  She also has a history of painful popping in the lateral hindfoot/ankle.     She also has a history of right foot pain, secondary to posterior tibial tendinitis along with pes planus, as well as some degenerative changes of the subtalar joint (a right subtalar joint injection on 12/20/2021 helped her pain approximately 50% for about 3 days) and diffusely throughout the midfoot.       Notably, she has the past medical history as above.  She has a history of fibromyalgia, and a history of tobacco use (1/4 pack of cigarettes per day). [x]  Sutures/staples were removed and steri-strips applied          Precautions:  nonweight bearing x 4 weeks anticipated on the Right lower extremity             --At postop week 4, the patient may begin weightbearing as tolerated in the cam boot, and may begin active range of motion and gentle active assisted range of motion into plantarflexion and dorsiflexion, as well as eversion. No inversion until 12 weeks. May wean out of the cam boot into a lace up ankle brace at the 6-week yenny. Cam boot on 24 hours except for exercise until the 6-week yenny. [x]? Physical Therapy/Home exercises        Immobilization:      []? Splint/Cast               [x]? CAM boot                    []?  Comfortable shoe    []? Other:                DVT ppx:   [x]? Early mobilization              [x]? Medication as prescribed (Eliquis)                      []?  No chemical ppx needed; mechanical only             -     Pain control:  Medication as prescribed (dosing and quantity) indicated for acute postoperative pain control             -     Special concerns:          [x]? Tobacco cessation     []?            [x]? Avoid strenuous activity/pain provoking maneuvers and high-impact repetitive exercises    All questions were answered and the patient agrees with the above plan. The patient will return to clinic in 4 weeks without x-rays         No follow-ups on file. No orders of the defined types were placed in this encounter.     No orders of the defined types were placed in this encounter. Jessy Nowak MD  Orthopedic Surgery        Please excuse any typos/errors, as this note was created with the assistance of voice recognition software. While intending to generate a document that actually reflects the content of the visit, the document can still have some errors including those of syntax and sound-a-like substitutions which may escape proof reading. In such instances, actual meaning can be extrapolated by context.

## 2022-04-14 ENCOUNTER — HOSPITAL ENCOUNTER (OUTPATIENT)
Dept: PHYSICAL THERAPY | Facility: CLINIC | Age: 47
Setting detail: THERAPIES SERIES
Discharge: HOME | End: 2022-04-14
Payer: COMMERCIAL

## 2022-04-14 PROCEDURE — 97140 MANUAL THERAPY 1/> REGIONS: CPT

## 2022-04-14 PROCEDURE — 97161 PT EVAL LOW COMPLEX 20 MIN: CPT

## 2022-04-14 PROCEDURE — 97016 VASOPNEUMATIC DEVICE THERAPY: CPT

## 2022-04-14 NOTE — CONSULTS
[] [facility]) [facility] &  Therapy  [address]  [phone]  F: [phone] [] [address]  [facility]   [address]  P: [phone]  F: [phone] [] [address]  [address] [facility]  P: [phone]  F: [phone] [] [address]  [facility]   [address]   [place]: [phone]  F: [phone]  [x] [address]: [phone]  F: [phone]      Physical Therapy Lower Extremity Re- Evaluation    Date:  2022  Patient: Debby Hodges  :  1975  MRN: [id number]  Physician: Dr. 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 : - Dr. Peter Riley     Subjective:   CC: Pt arrives with CAM boot and knee scooter, able to maintain NWB. Pt states to having pain over incision site from boot, has let Dr. Peter Riley know and patietn states was not concerned. Pt only has one step to enter house, was able to navigate with assist from . Surgery:    Procedures Performed:  (3/18/2022)  1. Right ankle lateral ligamentous repair   2. Right ankle arthroscopic debridement  3. Right leg gastroc recession, performed through separate incision    Precautions: At postop week 4, 2022,  the patient may begin weightbearing as tolerated in the cam boot, and may begin active range of motion and gentle active assisted range of motion into plantarflexion and dorsiflexion, as well as eversion. No inversion until 12 weeks. May wean out of the cam boot into a lace up ankle brace at the 6-week yenny. Cam boot on 24 hours except for exercise until the 6-week yenny.       HPI: (onset date)    PMHx: [] Unremarkable [] Diabetes [] HTN  [] Pacemaker   [] MI/Heart Problems [] Cancer [] Arthritis [] Other:              [x] Refer to full medical chart  In EPIC         Tests: [x] X-Ray: [] MRI:  [] Other:    Medications: [x] Refer to full medical record [] None [] Other:  Allergies:      [x] Refer to full medical record [] None [] Other:        Marital Status , 2 adult children at home   Home type One level    Stairs from outside            [employer]    Stairs inside            [employer]    Employment [employer]   Job status Off d/t condition until at least June 9th   Work Activities/duties  On feet all day    Recreational Activities Motorcycle rides       Pain present?  Yes   Location Anterior ankle   Pain Rating currently 3/10   Pain at worse 4/10   Pain at best 0/10   Description of pain Ache    Altered Sensation Numbness to medail gastroc since surgery    What makes it worse    What makes it better    Symptom progression    Sleep                Objective:    ROM  ° A/P STRENGTH    Left Right Left Right   Hip Flex       Ext       ER       IR       ABD       ADD       Knee Flex       Ext       Ankle DF knee ext 12 10  3/5   Ankle DF knee flex  15 16     PF 75 55  3/5   INV 50 *  3/5**   EVER 15 28  3/5          * Not tested d/t no inversion for 12 weeks   ** Active motion observed once only, educated patient to not perform until 12 weeks post op     OBSERVATION No Deficit Deficit Not Tested Comments   Posture       Forward Head [] [] []    Rounded Shoulders [] [] []    Observation           Palpation [] [] []    Sensation [] [] []    Edema [] [] []    Neurological [] [] []    Patellar Mobility [] [] []    Patellar Orientation [] [] []    Gait [] [x] [] Analysis: Pt able to maintain NWB on RLE with use of knee scooter          FUNCTION Normal Difficult Unable   Sitting [x] [] []   Standing [] [x] []   Ambulation [] [x] []   Groom/Dress [] [x] []   Lift/Carry [] [x] []   Stairs [] [] [x]   Bending [] [] [x]   Squat [] [] [x]   Kneel [] [] [x]           Flexibility Normal Left tight Right tight   Hip flexor [x] [] []   quad [x] [] []   HS [x] [] []   piriformis [x] [] []   ITB [x] [] []   gastroc [] [] [x]   Soleus  [] [] [x]                        Functional Test: LEFS Score: 78% functionally impaired         Assessment:   Patient presents with signs and symptoms consistent with post op R ankle pain, limited ROM and strength. Patient would benefit from skilled physical therapy services in order to: Progressively increase R ankle ROM and strength, as well as progressive wt bearing from NWB to wt bearing in CAM boot once allowed per protocol on 4/18. Goals:        STG: (to be met in 10 treatments)  1. ? Pain: Decrease pain levels to 1/10 in R ankle   2. ? ROM: Increase flexibility and AROM limitations throughout to equal bilat to reduce difficulty with ADLs, R ankle DF with knee ext to 15deg  3. ? Strength: Increase R ankle strength globally to 4/5 once allowed to perform strengthening   4. ? Function: Pt to ambulate in tennis shoe without device once allowed per protocol   5. Independent with Home Exercise Programs      LTG: (to be met in 20 treatments)  1. Improve score on assessment tool from 78% impaired to 35% impaired demonstrating an improvement in ADLs  2. Reduce pain levels to 0/10                   Patient goals: To ride motorcycle this summer     Rehab Potential:  [x] Good  [] Fair  [] Poor   Suggested Professional Referral:  [x] No  [] Yes:  Barriers to Goal Achievement[de-identified]  [x] No  [] Yes:  Domestic Concerns:  [x] No  [] Yes:    Pt. Education:  [x] Plans/Goals, Risks/Benefits discussed  [x] Home exercise program    Method of Education: [x] Verbal  [x] Demo  [x] Written  Comprehension of Education:  [x] Verbalizes understanding. [x] Demonstrates understanding. [x] Needs Review. [] Demonstrates/verbalizes understanding of HEP/Ed previously given.     Treatment Plan:  [x] Therapeutic Exercise    [] Aquatic Therapy  [x] Manual Therapy   [] Electrical Stimulation  [x] Instruction in HEP      [] Lumbar/Cervical Traction  [] Neuromuscular Re-education [x] Cold/hotpack  [] Iontophoresis: 4 mg/mL  [x] Vasocompression (GameReady)                    Dexamethasone Sodium  [x] Gait Training             Phosphate 40-80 mAmin  [] Integrative Dry Needling         []  Medication allergies reviewed for use of    Dexamethasone Sodium Phosphate 4mg/ml     with iontophoresis treatments. Pt is not allergic. Frequency:  2 x/week for 20 visits    Todays Treatment:    Precautions: At postop week 4 (4/18/2022)  the patient may begin weightbearing as tolerated in the cam boot, and may begin active range of motion and gentle active assisted range of motion into plantarflexion and dorsiflexion, as well as eversion. No inversion until 12 weeks. May wean out of the cam boot into a lace up ankle brace at the 6-week yenny (5/2/22). Cam boot on 24 hours except for exercise until the 6-week yenny.     Exercises:  Exercise  R ankle post op   Reps/ Time Weight/ Level Comments         Long sitting gastroc strethc 3x30\"     Active DF/PF, Ev 20x                                                                             Other: Manual: Gentle MFR to medial gastroc     Vasocompression: 15' moderate compression R ankle 34 deg    Specific Instructions for next treatment: Progress to WBAT in CAM boot with RW (pt is to bring her own)    Evaluation Complexity:  History (Personal factors, comorbidities) [x] 0 [] 1-2 [] 3+   Exam (limitations, restrictions) [x] 1-2 [] 3 [] 4+   Clinical presentation (progression) [x] Stable [] Evolving  [] Unstable   Decision Making [x] Low [] Moderate [] High    [x] Low Complexity [] Moderate Complexity [] High Complexity       Treatment Charges: Mins Units   [x] Evaluation       [x]  Low       []  Moderate       []  High 15 1   []  Modalities     []  Ther Exercise     [x]  Manual Therapy 10 1   []  Ther Activities     []  Aquatics     [x]

## 2022-04-19 ENCOUNTER — APPOINTMENT (OUTPATIENT)
Dept: PHYSICAL THERAPY | Facility: CLINIC | Age: 47
End: 2022-04-19
Payer: COMMERCIAL

## 2022-04-19 ENCOUNTER — HOSPITAL ENCOUNTER (OUTPATIENT)
Dept: PHYSICAL THERAPY | Facility: CLINIC | Age: 47
Setting detail: THERAPIES SERIES
Discharge: HOME | End: 2022-04-19
Payer: COMMERCIAL

## 2022-04-19 PROCEDURE — 97140 MANUAL THERAPY 1/> REGIONS: CPT

## 2022-04-19 PROCEDURE — 97016 VASOPNEUMATIC DEVICE THERAPY: CPT

## 2022-04-19 PROCEDURE — 97110 THERAPEUTIC EXERCISES: CPT

## 2022-04-19 NOTE — FLOWSHEET NOTE
[] [facility]) [facility] CENTER &  Therapy  [address]  [phone]  F: [phone] [] [address]  [facility]   [address]  P: [phone]  F: [phone] [] [address]  [address]  P: [phone]  F: [phone] [x] [address]  P: [phone]  F: [phone] [] [address]  [facility]   [address]   [place]: [phone]  F: [phone]      Physical Therapy Daily Treatment Note    Date:  2022  Patient Name:  Lou Mobley    :  1975  MRN: [id number]  Physician: Dr. 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 : - Dr. José Luis Cazares   Visit# / total visits:      Cancels/No Shows: 0/0    Subjective:    Pain:  [] Yes  [] No Location:  N/A   Pain Rating: (0-10 scale)0/10  Pain altered Tx:  [] No  [] Yes  Action:  Comments: Pt arrives without pain. Does stat to being sore after evaluation in calf from manual work that was done. Pt brought own RW to adjust and progress to WBAT this date. Objective: Todays Treatment:     Precautions: At postop week 4 (2022)  the patient may begin weightbearing as tolerated in the cam boot, and may begin active range of motion and gentle active assisted range of motion into plantarflexion and dorsiflexion, as well as eversion. No inversion until 12 weeks. May wean out of the cam boot into a lace up ankle brace at the 6-week yenny (22).  Cam boot on 24 hours except for exercise until the 6-week yenny.                 Exercise  R ankle post op    Reps/ Time Weight/ Level Comments    NUSTEP 2. ? ROM: Increase flexibility and AROM limitations throughout to equal bilat to reduce difficulty with ADLs, R ankle DF with knee ext to 15deg  3. ? Strength: Increase R ankle strength globally to 4/5 once allowed to perform strengthening   4. ? Function: Pt to ambulate in tennis shoe without device once allowed per protocol   5. Independent with Home Exercise Programs        LTG: (to be met in 20 treatments)  1. Improve score on assessment tool from 78% impaired to 35% impaired demonstrating an improvement in ADLs  2. Reduce pain levels to 0/10                    Patient goals: To ride motorcycle this summer     Pt. Education:  [x] Yes  [] No  [] Reviewed Prior HEP/Ed  Method of Education: [x] Verbal  [x] Demo  [] Written  Comprehension of Education:  [x] Verbalizes understanding. [x] Demonstrates understanding. [x] Needs review. [] Demonstrates/verbalizes HEP/Ed previously given. Plan: [x] Continue current frequency toward long and short term goals.           Time In: 1300            Time Out: 1350    Electronically signed by:  Marah Kam, PT

## 2022-04-21 ENCOUNTER — HOSPITAL ENCOUNTER (OUTPATIENT)
Dept: PHYSICAL THERAPY | Facility: CLINIC | Age: 47
Setting detail: THERAPIES SERIES
Discharge: HOME | End: 2022-04-21
Payer: COMMERCIAL

## 2022-04-21 PROCEDURE — 97016 VASOPNEUMATIC DEVICE THERAPY: CPT

## 2022-04-21 PROCEDURE — 97140 MANUAL THERAPY 1/> REGIONS: CPT

## 2022-04-21 PROCEDURE — 97110 THERAPEUTIC EXERCISES: CPT

## 2022-04-21 NOTE — FLOWSHEET NOTE
[] [facility]) [facility] CENTER &  Therapy  [address]  [phone]  F: [phone] [] [address]  [facility]   [address]  P: [phone]  F: [phone] [] [address]  [address] [facility] [address]  P: [phone]  F: [phone] [x] [address]  P: [phone]  F: [phone] [] [address]  [facility]   [address]   [place]: [phone]  F: [phone]      Physical Therapy Daily Treatment Note    Date:  2022  Patient Name:  Wilkins Bloch    :  1975  MRN: [id number]  Physician: Dr. 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 : - Dr. Akash Ko   Visit# / total visits: 3/20     Cancels/No Shows: 0/0    Subjective:    Pain:  [] Yes  [] No Location:  N/A   Pain Rating: (0-10 scale) 3/10  Pain altered Tx:  [] No  [] Yes  Action:  Comments: Pt arrives with some heel soreness, pt attributes to wt bearing more. Arrives with RW and CAM boot. Objective: Todays Treatment:     Precautions: At postop week 4 (2022)  the patient may begin weightbearing as tolerated in the cam boot, and may begin active range of motion and gentle active assisted range of motion into plantarflexion and dorsiflexion, as well as eversion. No inversion until 12 weeks. May wean out of the cam boot into a lace up ankle brace at the 6-week yenny (22).  Cam boot on 24 hours except for exercise until the 6-week yenny.                 Exercise  R ankle post op    Reps/ Time Weight/ Level Comments    NUSTEP 6' L2   x   Long sitting gastroc strethc 3x30\"     x   Active DF/PF, Ev 20x     x               Ambulation in // bars progressed to RW in open gym x   Cues for step through pattern with good tolerance x   Standing wt shifts 2x10   All in // bars x   Marches 2x10     x   Hip abd and ext 2x10  RLE OKC x    Mini squats 2x10     x    Step ups fwd and lat 2x10 6\"   x                                                          Other: Manual: Gentle MFR to medial gastroc      Vasocompression: 15' moderate compression R ankle 34 deg     Specific Instructions for next treatment: Continue to progress WBAT          Treatment Charges: Mins Units   []  Modalities     [x]  Ther Exercise 20 1   [x]  Manual Therapy 10 1   []  Ther Activities     []  Aquatics     [x]  Vasocompression 15 1   []  Other     Total Treatment time 45 3       Assessment: [x] Progressing toward goals. Initiated treatment on NUSTEP followed by standing exercises as listed above. Progressed to include step ups which pt tolerated well, only noting minor heel pain. Continued manual with soreness and tightness continued to be noticed along medial gastroc. Ended with vasocompression to decrease post exercise soreness and edema. [] No change. [] Other:  [] Patient would continue to benefit from skilled physical therapy services in order to: Patient presents with signs and symptoms consistent with post op R ankle pain, limited ROM and strength. Patient would benefit from skilled physical therapy services in order to: Progressively increase R ankle ROM and strength, as well as progressive wt bearing from NWB to wt bearing in CAM boot once allowed per protocol on 4/18.            Goals:                               STG: (to be met in 10 treatments)  1. ? Pain: Decrease pain levels to 1/10 in R ankle   2. ? ROM: Increase flexibility and AROM limitations throughout to equal bilat to reduce difficulty with ADLs, R ankle DF with knee ext to 15deg  3. ? Strength:  Increase R ankle strength globally to 4/5 once allowed to perform strengthening   4. ? Function: Pt to ambulate in tennis shoe without device once allowed per protocol   5. Independent with Home Exercise Programs        LTG: (to be met in 20 treatments)  1. Improve score on assessment tool from 78% impaired to 35% impaired demonstrating an improvement in ADLs  2. Reduce pain levels to 0/10                    Patient goals: To ride motorcycle this summer     Pt. Education:  [x] Yes  [] No  [] Reviewed Prior HEP/Ed  Method of Education: [x] Verbal  [x] Demo  [] Written  Comprehension of Education:  [x] Verbalizes understanding. [x] Demonstrates understanding. [x] Needs review. [] Demonstrates/verbalizes HEP/Ed previously given. Plan: [x] Continue current frequency toward long and short term goals.           Time In: 1300            Time Out: 1350    Electronically signed by:  Lily Marx, PT

## 2022-04-25 ENCOUNTER — HOSPITAL ENCOUNTER (OUTPATIENT)
Dept: PHYSICAL THERAPY | Facility: CLINIC | Age: 47
Setting detail: THERAPIES SERIES
Discharge: HOME | End: 2022-04-25
Payer: COMMERCIAL

## 2022-04-25 PROCEDURE — 97016 VASOPNEUMATIC DEVICE THERAPY: CPT

## 2022-04-25 PROCEDURE — 97110 THERAPEUTIC EXERCISES: CPT

## 2022-04-25 PROCEDURE — 97140 MANUAL THERAPY 1/> REGIONS: CPT

## 2022-04-25 NOTE — FLOWSHEET NOTE
[] [facility]) [facility] &  Therapy  [address]  [phone]  F: [phone] [] [address]  [facility]   [address]  P: [phone]  F: [phone] [] [address]  [address]  P: [phone]  F: [phone] [x] [address]  P: [phone]  F: [phone] [] [address]  [facility]   [address]   [place]: [phone]  F: [phone]      Physical Therapy Daily Treatment Note    Date:  2022  Patient Name:  Lou Mobley    :  1975  MRN: [id number]  Physician: Dr. 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 : - Dr. José Luis Cazares   Visit# / total visits:      Cancels/No Shows: 0/0    Subjective:    Pain:  [x] Yes  [] No Location:  R ankle/ L knee   Pain Rating: (0-10 scale) 3/10 (L knee 8-9/10)  Pain altered Tx:  [] No  [] Yes  Action:  Comments: Pt arrived with RW and CAM Boot noted no increase in ankle pain but noted the L knee was giving her a lot of problems lately up to 8-9/10. Pt noted that she has called the doctor and will call again after treatment to schedule appt. Objective: Todays Treatment:     Precautions: At postop week 4 (2022)  the patient may begin weightbearing as tolerated in the cam boot, and may begin active range of motion and gentle active assisted range of motion into plantarflexion and dorsiflexion, as well as eversion. No inversion until 12 weeks. May wean out of the cam boot into a lace up ankle brace at the 6-week yenny (22).  Cam boot on 24 hours except for exercise until the 6-week yenny.       Exercise  R ankle post op    Reps/ Time Weight/ Level Comments    NUSTEP 6' L2   x   Long sitting gastroc strethc 3x30\"     x   Active DF/PF, Ev 20x     x               Ambulation in // bars progressed to RW in open gym x   Cues for step through pattern with good tolerance x   Standing wt shifts 2x10   All in // bars x   Marches 2x10     x   Hip abd and ext 2x10  RLE OKC x    Mini squats 2x10     x    Step ups fwd and lat 2x10 6\"   x                                                          Other: Manual: Gentle MFR to medial gastroc      Vasocompression: 15' moderate compression R ankle 34 deg     Specific Instructions for next treatment: Continue to progress WBAT          Treatment Charges: Mins Units   []  Modalities     [x]  Ther Exercise 10 1   [x]  Manual Therapy 25 1   []  Ther Activities     []  Aquatics     [x]  Vasocompression 10 1   []  Other     Total Treatment time 45 3       Assessment: [x] Progressing toward goals. Held all standing activities due to WBT and increased pain in the L knee. Pt performed marching in seated to decrease weight baring. Pt completed ROM and calf stretching to progress towards goals. Pt continued with manual therapy as noted above with decreased tension in medial calf. Pt concluded session with vasocompression. [] No change. [] Other:  [] Patient would continue to benefit from skilled physical therapy services in order to: Patient presents with signs and symptoms consistent with post op R ankle pain, limited ROM and strength.   Patient would benefit from skilled physical therapy services in order to: Progressively increase R ankle ROM and strength, as well as progressive wt bearing from NWB to wt bearing in CAM boot once allowed per protocol on 4/18.            Goals:                               STG: (to be met in 10 treatments)  1. ? Pain: Decrease pain levels to 1/10 in R ankle   2. ? ROM: Increase flexibility and AROM limitations throughout to equal bilat to reduce difficulty with ADLs, R ankle DF with knee ext to 15deg  3. ? Strength: Increase R ankle strength globally to 4/5 once allowed to perform strengthening   4. ? Function: Pt to ambulate in tennis shoe without device once allowed per protocol   5. Independent with Home Exercise Programs        LTG: (to be met in 20 treatments)  1. Improve score on assessment tool from 78% impaired to 35% impaired demonstrating an improvement in ADLs  2. Reduce pain levels to 0/10                    Patient goals: To ride motorcycle this summer     Pt. Education:  [x] Yes  [] No  [] Reviewed Prior HEP/Ed  Method of Education: [x] Verbal  [x] Demo  [] Written  Comprehension of Education:  [x] Verbalizes understanding. [x] Demonstrates understanding. [x] Needs review. [] Demonstrates/verbalizes HEP/Ed previously given. Plan: [x] Continue current frequency toward long and short term goals.           Time In: 1400            Time Out: 1450    Electronically signed by:  Melissa Foote PTA

## 2022-04-27 ENCOUNTER — HOSPITAL ENCOUNTER (OUTPATIENT)
Dept: PHYSICAL THERAPY | Facility: CLINIC | Age: 47
Setting detail: THERAPIES SERIES
Discharge: HOME | End: 2022-04-27
Payer: COMMERCIAL

## 2022-04-27 PROCEDURE — 97110 THERAPEUTIC EXERCISES: CPT

## 2022-04-27 PROCEDURE — 97140 MANUAL THERAPY 1/> REGIONS: CPT

## 2022-04-27 NOTE — FLOWSHEET NOTE
6-week yenny (5/2/22). Cam boot on 24 hours except for exercise until the 6-week yenny.       Exercise  R ankle post op    Reps/ Time Weight/ Level Comments    NUSTEP 6' L2   x   Long sitting gastroc strethc 3x30\"     x   Active DF/PF, Ev 20x     x               Ambulation in // bars progressed to RW in open gym x   Cues for step through pattern with good tolerance X-held   Standing wt shifts 2x10   All in // bars X-held   Marches 2x10    completed in sitting X-   Hip abd and ext 2x10  RLE OKC X-held    Mini squats 2x10     X-held    Step ups fwd and lat 2x10 6\"   X-held                                                           Other: Manual: Gentle MFR to medial gastroc      Vasocompression: 15' moderate compression R ankle 34 deg     Specific Instructions for next treatment: Continue to progress WBAT          Treatment Charges: Mins Units   []  Modalities     [x]  Ther Exercise 8 1   [x]  Manual Therapy 30 2   []  Ther Activities     []  Aquatics     [x]  Vasocompression     []  Other     Total Treatment time 38 3       Assessment: [] Progressing toward goals. [x] No change. Held all standing activities due to increased pain level. Pt extremely sensitive to manual therapy this date with multiple bruises present on her inured calf. Pt to see doctor tomorrow. [] Other:  [] Patient would continue to benefit from skilled physical therapy services in order to: Patient presents with signs and symptoms consistent with post op R ankle pain, limited ROM and strength.   Patient would benefit from skilled physical therapy services in order to: Progressively increase R ankle ROM and strength, as well as progressive wt bearing from NWB to wt bearing in CAM boot once allowed per protocol on 4/18.            Goals:                               STG: (to be met in 10 treatments)  1. ? Pain: Decrease pain levels to 1/10 in R ankle   2. ? ROM: Increase flexibility and AROM limitations throughout to equal bilat to reduce difficulty with ADLs, R ankle DF with knee ext to 15deg  3. ? Strength: Increase R ankle strength globally to 4/5 once allowed to perform strengthening   4. ? Function: Pt to ambulate in tennis shoe without device once allowed per protocol   5. Independent with Home Exercise Programs        LTG: (to be met in 20 treatments)  1. Improve score on assessment tool from 78% impaired to 35% impaired demonstrating an improvement in ADLs  2. Reduce pain levels to 0/10                    Patient goals: To ride motorcycle this summer     Pt. Education:  [x] Yes  [] No  [] Reviewed Prior HEP/Ed  Method of Education: [x] Verbal  [x] Demo  [] Written  Comprehension of Education:  [x] Verbalizes understanding. [x] Demonstrates understanding. [x] Needs review. [] Demonstrates/verbalizes HEP/Ed previously given. Plan: [x] Continue current frequency toward long and short term goals.           Time In: 1100            Time Out: 1145    Electronically signed by:  Melissa Foote PTA

## 2022-04-28 ENCOUNTER — OFFICE VISIT (OUTPATIENT)
Dept: ORTHOPEDIC SURGERY | Age: 47
End: 2022-04-28

## 2022-04-28 VITALS — WEIGHT: 258 LBS | HEIGHT: 65 IN | BODY MASS INDEX: 42.99 KG/M2 | RESPIRATION RATE: 16 BRPM

## 2022-04-28 DIAGNOSIS — M25.371 INSTABILITY OF RIGHT ANKLE JOINT: Primary | ICD-10-CM

## 2022-04-28 PROCEDURE — 99024 POSTOP FOLLOW-UP VISIT: CPT | Performed by: ORTHOPAEDIC SURGERY

## 2022-04-28 RX ORDER — GABAPENTIN 100 MG/1
300 CAPSULE ORAL NIGHTLY
Qty: 30 CAPSULE | Refills: 0 | Status: SHIPPED | OUTPATIENT
Start: 2022-04-28 | End: 2022-05-28

## 2022-04-28 NOTE — PROGRESS NOTES
[address] [place] [address] AND SPORTS MEDICINE  [address] [address]  Dept: [phone]    Ambulatory Orthopedic Postoperative Visit     Preoperative Diagnosis:   1. Right ankle instability with recurrent sprains  2. Right ankle synovitis  3. Right gastrocnemius equinus contracture  4. Right foot plantar fasciitis  5. Tobacco use  6. Fibromyalgia  7. Body mass index is 43.07 kg/m².     Postoperative Diagnosis:   1. Same      Procedures Performed:  (3/18/2022)  1. Right ankle lateral ligamentous repair   2. Right ankle arthroscopic debridement  3. Right leg gastroc recession, performed through separate incision       SUBJECTIVE:     The patient returns post op from the above stated procedure. Reports doing well overall, reports improved pain, denies wound drainage/issues, fevers/chills/night sweats, posterior calf swelling/pain, chest pain, shortness of breath. She reports an occasional burning sensation in her medial distal leg/calf, and reports that this gets irritated occasionally with some of the exercises at physical therapy. OBJECTIVE:  Resp 16   Ht 5' 5\" (1.651 m)   Wt 258 lb (117 kg)   BMI 42.93 kg/m²    NAD, resting comfortably  Incisions clean/dry/intact, no erythema/dehiscence/drainage  Sensation to light touch grossly intact throughout slightly diminished throughout the medial hindfoot  Warm and well perfused  Grossly neurovascularly intact distally  No signs of infection  No calf swelling/tenderness posteriorly  -Painless ankle range of motion, great dorsiflexion and plantarflexion  -No significant periincisional tenderness      RADIOLOGY:   4/28/2022 No new radiology images today. Prior images reviewed for reference. ASSESSMENT AND PLAN:     6 weeks s/p above, doing well overall.   Her postoperative course has been complicated by some intermittent neuropathic pain medially in the distal leg/calf. She has a history of right ankle pain with a history of ankle instability (mildly positive talar tilt, equivocal anterior drawer, varus hindfoot axis; reports a history of rolling her ankle daily), with underlying ankle synovitis, as well as a component of plantar fasciitis with a gastroc equinus contracture.  She also has a history of painful popping in the lateral hindfoot/ankle.     She also has a history of right foot pain, secondary to posterior tibial tendinitis along with pes planus, as well as some degenerative changes of the subtalar joint (a right subtalar joint injection on 12/20/2021 helped her pain approximately 50% for about 3 days) and diffusely throughout the midfoot.       Notably, she has the past medical history as above.  She has a history of fibromyalgia, and a history of tobacco use (1/4 pack of cigarettes per day). Precautions:               --At postop week 4, the patient may begin weightbearing as tolerated in the cam boot, and may begin active range of motion and gentle active assisted range of motion into plantarflexion and dorsiflexion, as well as eversion. No inversion until 12 weeks. May wean out of the cam boot into a lace up ankle brace at the 6-week yenny. Cam boot on 24 hours except for exercise until the 6-week yenny. [x]? Physical Therapy/Home exercises   --desensitization and scar massage added today     Immobilization:      []? Splint/Cast               []?  CAM boot                    []?  Comfortable shoe    [x]? Other: Lace up ankle brace, provided today               DVT ppx:   [x]? Early mobilization              []?  Medication as prescribed (Eliquis)                      [x]? No chemical ppx needed; mechanical only             -     Pain control:  Medication as prescribed (dosing and quantity) indicated for acute postoperative pain control             -     Special concerns:          [x]?   Tobacco cessation     []?         [x]?  Avoid strenuous activity/pain provoking maneuvers and high-impact repetitive exercises      -At today's visit, she was ordered Gabapentin as below, and we discussed safe dosing/titration and the appropriate risks. The patient was provided teaching material on this today. All questions were answered and the patient agrees with the above plan. The patient will return to clinic in 6 weeks without x-rays         No follow-ups on file. No orders of the defined types were placed in this encounter. No orders of the defined types were placed in this encounter. Lily Paula MD  Orthopedic Surgery        Please excuse any typos/errors, as this note was created with the assistance of voice recognition software. While intending to generate a document that actually reflects the content of the visit, the document can still have some errors including those of syntax and sound-a-like substitutions which may escape proof reading. In such instances, actual meaning can be extrapolated by context.

## 2022-05-02 ENCOUNTER — HOSPITAL ENCOUNTER (OUTPATIENT)
Dept: PHYSICAL THERAPY | Facility: CLINIC | Age: 47
Setting detail: THERAPIES SERIES
Discharge: HOME | End: 2022-05-02
Payer: COMMERCIAL

## 2022-05-02 PROCEDURE — 97110 THERAPEUTIC EXERCISES: CPT

## 2022-05-02 PROCEDURE — 97016 VASOPNEUMATIC DEVICE THERAPY: CPT

## 2022-05-02 PROCEDURE — 97140 MANUAL THERAPY 1/> REGIONS: CPT

## 2022-05-02 NOTE — FLOWSHEET NOTE
[] [facility]) - [facility] &  Therapy  [address]  [phone]  F: [phone] [] [address]  [facility]   [address]  P: [phone]  F: [phone] [] [address]  [address] [facility]  P: [phone]  F: [phone] [x] [address]  P: [phone]  F: [phone] [] [address]  [facility]   [address]   [place]: [phone]  F: [phone]      Physical Therapy Daily Treatment Note    Date:  2022  Patient Name:  Abdelrahman Guillaume    :  1975  MRN: [id number]  Physician: Dr. Rehman Anchors: Cecilia Bender ([facility] after )  Medical Diagnosis: Instability of R ankle joint (post op)                 Rehab Codes: M25.371  Onset date: 3/18/22 DOS                 Next Dr's appt. : - Dr. Laura Osorio   Visit# / total visits:      Cancels/No Shows: 0/0    Subjective:    Pain:  [x] Yes  [] No Location:  R ankle/ L knee   Pain Rating: (0-10 scale) 5/10 (L knee 8/10)  Pain altered Tx:  [] No  [] Yes  Action:  Comments: Pt arrives with lace up ankle brace, states had follow up with Dr. Laura Osorio and gave OK to wean out of CAM boot into brace. Does state that brace feels too tight and is planning on getting a larger size after appt today. Pt has also started taking Gabapentin for nerve pain in legs and has already noticed an improvement. Pt has updated referral from Dr. Laura Osorio which includes scar massage and desensitization. Objective: Todays Treatment:     Precautions:  At postop week 4 (2022)  the patient may begin weightbearing as tolerated in the cam boot, and may begin active range of motion and gentle active assisted range of motion into plantarflexion and dorsiflexion, as well as eversion. No inversion until 12 weeks. May wean out of the cam boot into a lace up ankle brace at the 6-week yenny (5/2/22). Cam boot on 24 hours except for exercise until the 6-week yenny.       Exercise  R ankle post op    Reps/ Time Weight/ Level Comments    NUSTEP 6' L2   x   Long sitting gastroc strethc 3x30\"     x   Active DF/PF, Ev 20x     x               Ambulation in // bars progressed to RW in open gym x   Cues for step through pattern with good tolerance -   Standing wt shifts 2x10   All in // bars -   Marches 2x10     x   Hip abd and ext 2x10  bilat x    Mini squats 2x10     x    Step ups fwd and lat 2x10 6\"   x    Heel raises  2x10     x    TG squats and HR 2x10 L18   x               Scar massage x     x              Other: Manual:MFR to medial gastroc, scar massage      Vasocompression: 15' moderate compression R ankle 34 deg     Specific Instructions for next treatment: Continue to progress standing ankle strengthening          Treatment Charges: Mins Units   []  Modalities     [x]  Ther Exercise 35 2   [x]  Manual Therapy 10 1   []  Ther Activities     []  Aquatics     [x]  Vasocompression 15 1   []  Other     Total Treatment time 60 4       Assessment: [x] Progressing toward goals. Initiated treatment on NUSTEP followed by standing exercises as listed above. Progressed to completing all standing exercises in ankle brace vs CAM boot which pt tolerated well, denying any increase in pain throughout. Also progressed to include heel raises on both TG and standing in // bars which pt was able to complete with good tolerance. Added in scar massage to all scar with slight tightness and sensitivity in scar located along lateral malleolus; educated pt to perform on self at home with good understanding. Ended with vasocompression to decrease post exercise pain and edema. [] No change.       [] Other:  [] Patient would continue to benefit from skilled physical therapy services in order to: Patient presents with signs and symptoms consistent with post op R ankle pain, limited ROM and strength. Patient would benefit from skilled physical therapy services in order to: Progressively increase R ankle ROM and strength, as well as progressive wt bearing from NWB to wt bearing in CAM boot once allowed per protocol on 4/18.            Goals:                               STG: (to be met in 10 treatments)  1. ? Pain: Decrease pain levels to 1/10 in R ankle   2. ? ROM: Increase flexibility and AROM limitations throughout to equal bilat to reduce difficulty with ADLs, R ankle DF with knee ext to 15deg  3. ? Strength: Increase R ankle strength globally to 4/5 once allowed to perform strengthening   4. ? Function: Pt to ambulate in tennis shoe without device once allowed per protocol   5. Independent with Home Exercise Programs        LTG: (to be met in 20 treatments)  1. Improve score on assessment tool from 78% impaired to 35% impaired demonstrating an improvement in ADLs  2. Reduce pain levels to 0/10                    Patient goals: To ride motorcycle this summer     Pt. Education:  [x] Yes  [] No  [] Reviewed Prior HEP/Ed  Method of Education: [x] Verbal  [x] Demo  [] Written  Comprehension of Education:  [x] Verbalizes understanding. [x] Demonstrates understanding. [x] Needs review. [] Demonstrates/verbalizes HEP/Ed previously given. Plan: [x] Continue current frequency toward long and short term goals.           Time In: 1100            Time Out: 1200    Electronically signed by:  Marcelino Gonzalez PT

## 2022-05-04 ENCOUNTER — HOSPITAL ENCOUNTER (OUTPATIENT)
Dept: PHYSICAL THERAPY | Facility: CLINIC | Age: 47
Setting detail: THERAPIES SERIES
Discharge: HOME | End: 2022-05-04
Payer: COMMERCIAL

## 2022-05-04 PROCEDURE — 97140 MANUAL THERAPY 1/> REGIONS: CPT

## 2022-05-04 PROCEDURE — 97110 THERAPEUTIC EXERCISES: CPT

## 2022-05-04 PROCEDURE — 97016 VASOPNEUMATIC DEVICE THERAPY: CPT

## 2022-05-04 NOTE — FLOWSHEET NOTE
[] [facility]) - [facility] &  Therapy  [address]  [phone]  F: [phone] [] [address]  [facility] [address]   [address]  P: [phone]  F: [phone] [] [address]  [address]  P: [phone]  F: [phone] [x] [address]  P: [phone]  F: [phone] [] [address]  [facility]   [address]   [place]: [phone]  F: [phone]      Physical Therapy Daily Treatment Note    Date:  2022  Patient Name:  Bel Suarez    :  1975  MRN: [id number]  Physician: Dr. Moses Del Rio: [address] (Chau Ast after )  Medical Diagnosis: Instability of R ankle joint (post op)                 Rehab Codes: M25.371  Onset date: 3/18/22 DOS                 Next Dr's appt. : - Dr. Ge Turpin   Visit# / total visits:      Cancels/No Shows: 0/0    Subjective:    Pain:  [x] Yes  [] No Location:  R ankle/ L knee   Pain Rating: (0-10 scale) 5/10 (L knee 8/10)  Pain altered Tx:  [] No  [] Yes  Action:  Comments: Pt reported that she self-prescribed an increase in her gabapentin from 200mg to 300mg because 200mg wasn't doing anything. Pt noted that this morning she also took over the counter pain killer. No change in pain rating from last visit but is on more medication than last visit. Objective: Todays Treatment:     Precautions: At postop week 4 (2022)  the patient may begin weightbearing as tolerated in the cam boot, and may begin active range of motion and gentle active assisted range of motion into plantarflexion and dorsiflexion, as well as eversion. No inversion until 12 weeks. May wean out of the cam boot into a lace up ankle brace at the 6-week yenny (22).  Cam boot on 24 hours except for exercise until the 6-week yenny.       Exercise  R ankle post op    Reps/ Time Weight/ Level Comments    NUSTEP 6' L2   x   Long sitting gastroc strethc 3x30\"     x   Active DF/PF, Ev 20x     x               Ambulation in // bars progressed to RW in open gym x   Cues for step through pattern with good tolerance -   Standing wt shifts 2x10   All in // bars -   Marches 2x10     x   Hip abd and ext 2x10  bilat x    Mini squats 2x10     x    Step ups fwd and lat 2x10 6\"   x    Heel raises  2x10     x    TG squats and HR 2x10 L18   x               Scar massage x     x              Other: Manual:MFR to medial gastroc, scar massage      Vasocompression: 15' moderate compression R ankle 34 deg     Specific Instructions for next treatment: Continue to progress standing ankle strengthening          Treatment Charges: Mins Units   []  Modalities     [x]  Ther Exercise 30 2   [x]  Manual Therapy 15 1   []  Ther Activities     []  Aquatics     [x]  Vasocompression 10 1   []  Other     Total Treatment time 55 4       Assessment: [x] Progressing toward goals. Pt with good tolerance to all activities, no progressions due to increased pain throughout but pt was able to tolerate all reps. Pt received verbal and tactile cues for proper technique for mini squats with moderate carry over. Pt received manual to R calf with most tenderness/tightness noted medially and inferiorly. Pt concluded session with vasocompression. [] No change. [] Other:  [] Patient would continue to benefit from skilled physical therapy services in order to: Patient presents with signs and symptoms consistent with post op R ankle pain, limited ROM and strength.   Patient would benefit from skilled physical therapy services in order to: Progressively increase R ankle ROM and strength, as well as progressive wt bearing from NWB to wt bearing in CAM boot once allowed per protocol on 4/18.            Goals:                               STG: (to be met in 10 treatments)  1. ? Pain: Decrease pain levels to 1/10 in R ankle   2. ? ROM: Increase flexibility and AROM limitations throughout to equal bilat to reduce difficulty with ADLs, R ankle DF with knee ext to 15deg  3. ? Strength: Increase R ankle strength globally to 4/5 once allowed to perform strengthening   4. ? Function: Pt to ambulate in tennis shoe without device once allowed per protocol   5. Independent with Home Exercise Programs        LTG: (to be met in 20 treatments)  1. Improve score on assessment tool from 78% impaired to 35% impaired demonstrating an improvement in ADLs  2. Reduce pain levels to 0/10                    Patient goals: To ride motorcycle this summer     Pt. Education:  [x] Yes  [] No  [] Reviewed Prior HEP/Ed  Method of Education: [x] Verbal  [x] Demo  [] Written  Comprehension of Education:  [x] Verbalizes understanding. [x] Demonstrates understanding. [x] Needs review. [] Demonstrates/verbalizes HEP/Ed previously given. Plan: [x] Continue current frequency toward long and short term goals.           Time In: 1100            Time Out: 1200    Electronically signed by:  Ric Booth PTA

## 2022-05-09 ENCOUNTER — TELEPHONE (OUTPATIENT)
Dept: ORTHOPEDIC SURGERY | Age: 47
End: 2022-05-09

## 2022-05-09 ENCOUNTER — HOSPITAL ENCOUNTER (OUTPATIENT)
Dept: PHYSICAL THERAPY | Facility: CLINIC | Age: 47
Setting detail: THERAPIES SERIES
Discharge: HOME | End: 2022-05-09
Payer: COMMERCIAL

## 2022-05-09 PROCEDURE — 97110 THERAPEUTIC EXERCISES: CPT

## 2022-05-09 PROCEDURE — 97530 THERAPEUTIC ACTIVITIES: CPT

## 2022-05-09 NOTE — FLOWSHEET NOTE
[] [facility]) [facility] &  Therapy  [address]  [phone]  F: [phone] [] [address]  [facility] [address]   [address]  P: [phone]  F: [phone] [] [address]  [address] [address] [facility] [address]  P: [phone]  F: [phone] [x] [email or web address]  P: [phone]  F: [phone] [] [address]  [facility]   [address]   [place]: [phone]  F: [phone]      Physical Therapy Daily Treatment Note    Date:  2022  Patient Name:  Brown Galindo    :  1975  MRN: [id number]  Physician: Dr. Gonzales Ast: Solo Rodriguez 150 ([facility] after )  Medical Diagnosis: Instability of R ankle joint (post op)                 Rehab Codes: M25.371  Onset date: 3/18/22 DOS                 Next 's appt. : - Dr. Raman Hand   Visit# / total visits:      Cancels/No Shows: 0/0    Subjective:    Pain:  [x] Yes  [] No Location:  R ankle/ L knee   Pain Rating: (0-10 scale) 3/10 (L knee 8/10)  Pain altered Tx:  [] No  [] Yes  Action:  Comments: Pt reported that pain has slightly improved in R ankle. Stated that she was on it a lot yesterday and the pain got up to 7/10 before she had to sit and relax. Pt noted that she is still having pain in bilateral knees up to 8/10. Objective: Todays Treatment:     Precautions: At postop week 4 (2022)  the patient may begin weightbearing as tolerated in the cam boot, and may begin active range of motion and gentle active assisted range of motion into plantarflexion and dorsiflexion, as well as eversion. No inversion until 12 weeks. May wean out of the cam boot into a lace up ankle brace at the 6-week yenny (22).  Cam boot on 24 hours except for exercise until the 6-week yenny.       Exercise  R ankle post op    Reps/ Time Weight/ Level Comments    NUSTEP 6' L2   x   Long sitting gastroc strethc 3x30\"     x   Active DF/PF, Ev 20x     x               Ambulation in // bars progressed to RW in open gym x   Cues for step through pattern with good tolerance -   Standing wt shifts 2x10   All in // bars -   Marches 2x10     x   Hip abd and ext 2x10  bilat x    Mini squats 2x10     x    Step ups fwd and lat 2x10 6\"   x    Heel raises  2x10     x    TG squats and HR 2x10 L18   x               Scar massage x     x              Other: Manual:MFR to medial gastroc, scar massage      Vasocompression: 15' moderate compression R ankle 34 deg     Specific Instructions for next treatment: Continue to progress standing ankle strengthening          Treatment Charges: Mins Units   []  Modalities     [x]  Ther Exercise 15 1   [x]  Manual Therapy     []  Ther Activities 10 1   []  Aquatics     [x]  Vasocompression     []  Other     Total Treatment time 25 2       Assessment: [x] Progressing toward goals. Per PT, only completed seated and ROM activities due to pt needing to see doctor. PT spent time looking at anterior leg and incision area with recommendation to get in to see the doctor. Primary PT Vida: Incision from gastroc recession noted to be red and warm to the touch, a change since last visit. Pt continued to have gastroc tenderness which has been consistent since initial evaluation. Patient without any pitting edema. Educated pt to call Dr. Dangelo Ba today, pt voices understanding. [] No change. [] Other:  [] Patient would continue to benefit from skilled physical therapy services in order to: Patient presents with signs and symptoms consistent with post op R ankle pain, limited ROM and strength.   Patient would benefit from skilled physical therapy services in order to: Progressively increase R ankle ROM and strength, as well as progressive wt bearing from NWB to wt bearing in CAM boot once allowed per protocol on 4/18.            Goals:                               STG: (to be met in 10 treatments)  1. ? Pain: Decrease pain levels to 1/10 in R ankle   2. ? ROM: Increase flexibility and AROM limitations throughout to equal bilat to reduce difficulty with ADLs, R ankle DF with knee ext to 15deg  3. ? Strength: Increase R ankle strength globally to 4/5 once allowed to perform strengthening   4. ? Function: Pt to ambulate in tennis shoe without device once allowed per protocol   5. Independent with Home Exercise Programs        LTG: (to be met in 20 treatments)  1. Improve score on assessment tool from 78% impaired to 35% impaired demonstrating an improvement in ADLs  2. Reduce pain levels to 0/10                    Patient goals: To ride motorcycle this summer     Pt. Education:  [x] Yes  [] No  [] Reviewed Prior HEP/Ed  Method of Education: [x] Verbal  [x] Demo  [] Written  Comprehension of Education:  [x] Verbalizes understanding. [x] Demonstrates understanding. [x] Needs review. [] Demonstrates/verbalizes HEP/Ed previously given. Plan: [x] Continue current frequency toward long and short term goals.           Time In: 1100            Time Out: 1125    Electronically signed by:  Oksana Person PTA

## 2022-05-09 NOTE — TELEPHONE ENCOUNTER
Patient called, left  regarding redness around calf incision. States there is no drainage from wound, or excess warmth around the area. Patient is not fevering. She did say she will send a picture of incision via [facility].

## 2022-05-12 ENCOUNTER — HOSPITAL ENCOUNTER (OUTPATIENT)
Dept: PHYSICAL THERAPY | Facility: CLINIC | Age: 47
Setting detail: THERAPIES SERIES
Discharge: HOME | End: 2022-05-12
Payer: COMMERCIAL

## 2022-05-12 PROCEDURE — 97110 THERAPEUTIC EXERCISES: CPT

## 2022-05-12 PROCEDURE — 97016 VASOPNEUMATIC DEVICE THERAPY: CPT

## 2022-05-12 PROCEDURE — 97140 MANUAL THERAPY 1/> REGIONS: CPT

## 2022-05-12 NOTE — FLOWSHEET NOTE
[] [facility]) [facility] &  Therapy  [address]  [phone]  F: [phone] [] [address]  [facility]   [address]  P: [phone]  F: [phone] [] [address]  [address]  P: [phone]  F: [phone] [x] [address]  P: [phone]  F: [phone] [] [address]  [facility]   [address]   [place]: [phone]  F: [phone]      Physical Therapy Daily Treatment Note    Date:  2022  Patient Name:  Griselda Leon    :  1975  MRN: [id number]  Physician: Dr. 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 : - Dr. Js Dias   Visit# / total visits:      Cancels/No Shows: 0/0    Subjective:    Pain:  [x] Yes  [] No Location:  R ankle/ L knee   Pain Rating: (0-10 scale) 3/10 (L knee 7/10)  Pain altered Tx:  [] No  [] Yes  Action:  Comments: Pt reported that her ankle pain was slightly decreased as well as knee but with increased activity pain can increase as well. Objective: Todays Treatment:     Precautions: At postop week 4 (2022)  the patient may begin weightbearing as tolerated in the cam boot, and may begin active range of motion and gentle active assisted range of motion into plantarflexion and dorsiflexion, as well as eversion. No inversion until 12 weeks. May wean out of the cam boot into a lace up ankle brace at the 6-week yenny (22).  Cam boot on 24 hours except for exercise until the 6-week yenny.       Exercise  R ankle post op    Reps/ Time Weight/ Level Comments    NUSTEP 6' L2   x   Long sitting gastroc strethc 3x30\"     x   Active DF/PF, Ev 30x  lime   x               Ambulation in // bars progressed to RW in open gym x   Cues for step through pattern with good tolerance -   Standing wt shifts 2x10   All in // bars -   Marches 2x10     x   Hip abd and ext 2x10  bilat x    Mini squats 2x20     x    Step ups fwd and lat 2x10 6\"   x    Heel raises  2x10     x    TG squats and HR 3x10 L18   x               Scar massage x     x              Other: Manual:MFR to medial gastroc, scar massage      Vasocompression: 15' moderate compression R ankle 34 deg     Specific Instructions for next treatment: Continue to progress standing ankle strengthening          Treatment Charges: Mins Units   []  Modalities     [x]  Ther Exercise 32 2   [x]  Manual Therapy 15 1   []  Ther Activities     []  Aquatics     [x]  Vasocompression 10 1   []  Other     Total Treatment time 57 4       Assessment: [x] Progressing toward goals. Resumed activities per doctors order with good pt tolerance. Noted min to moderate increase in soreness as activities continued in her R ankle. Increased reps of mini squats and TG squats and HR. Added resistance for 3 way ankle. Pt received manual with focus on inferior calf due to increased muscle tightness. Pt concluded session with vasocompression to decrease pain and soreness. [] No change. [] Other:  [] Patient would continue to benefit from skilled physical therapy services in order to: Patient presents with signs and symptoms consistent with post op R ankle pain, limited ROM and strength.   Patient would benefit from skilled physical therapy services in order to: Progressively increase R ankle ROM and strength, as well as progressive wt bearing from NWB to wt bearing in CAM boot once allowed per protocol on 4/18.            Goals:                               STG: (to be met in 10 treatments)  1. ? Pain: Decrease pain levels to 1/10 in R ankle   2. ? ROM: Increase flexibility and AROM limitations throughout to equal bilat to reduce difficulty with ADLs, R ankle DF with knee ext to 15deg  3. ? Strength: Increase R ankle strength globally to 4/5 once allowed to perform strengthening   4. ? Function: Pt to ambulate in tennis shoe without device once allowed per protocol   5. Independent with Home Exercise Programs        LTG: (to be met in 20 treatments)  1. Improve score on assessment tool from 78% impaired to 35% impaired demonstrating an improvement in ADLs  2. Reduce pain levels to 0/10                    Patient goals: To ride motorcycle this summer     Pt. Education:  [x] Yes  [] No  [] Reviewed Prior HEP/Ed  Method of Education: [x] Verbal  [x] Demo  [] Written  Comprehension of Education:  [x] Verbalizes understanding. [x] Demonstrates understanding. [x] Needs review. [] Demonstrates/verbalizes HEP/Ed previously given. Plan: [x] Continue current frequency toward long and short term goals.           Time In: 1058            Time Out: 1201    Electronically signed by:  Griselda Mai PTA

## 2022-05-16 ENCOUNTER — HOSPITAL ENCOUNTER (OUTPATIENT)
Dept: PHYSICAL THERAPY | Facility: CLINIC | Age: 47
Setting detail: THERAPIES SERIES
Discharge: HOME | End: 2022-05-16
Payer: COMMERCIAL

## 2022-05-16 PROCEDURE — 97110 THERAPEUTIC EXERCISES: CPT

## 2022-05-16 PROCEDURE — 97016 VASOPNEUMATIC DEVICE THERAPY: CPT

## 2022-05-16 PROCEDURE — 97140 MANUAL THERAPY 1/> REGIONS: CPT

## 2022-05-16 NOTE — FLOWSHEET NOTE
[] [facility]) - [facility] &  Therapy  [address]  [phone]  F: [phone] [] [address]  [facility]   [address]  P: [phone]  F: [phone] [] [address]  [address] [facility]  P: [phone]  F: [phone] [x] [address]  P: [phone]  F: [phone] [] [address]  [facility]   [address]   [place]: [phone]  F: [phone]      Physical Therapy Daily Treatment Note    Date:  2022  Patient Name:  Dian Mcardle    :  1975  MRN: [id number]  Physician: Dr. 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 : - Dr. Wes Pringle   Visit# / total visits: 10/20     Cancels/No Shows: 0/0    Subjective:    Pain:  [x] Yes  [] No Location:  R ankle/ L knee   Pain Rating: (0-10 scale) 4-5/10 (L knee 7/10)  Pain altered Tx:  [] No  [] Yes  Action:  Comments: Pt reported she has increased soreness and tightness in R calf and that the left knee is still bothering her. Objective: Todays Treatment:     Precautions: At postop week 4 (2022)  the patient may begin weightbearing as tolerated in the cam boot, and may begin active range of motion and gentle active assisted range of motion into plantarflexion and dorsiflexion, as well as eversion. No inversion until 12 weeks. May wean out of the cam boot into a lace up ankle brace at the 6-week yenny (22).  Cam boot on 24 hours except for exercise until the 6-week yenny.       Exercise  R ankle post op    Reps/ Time Weight/ Level Comments    NUSTEP 6' L2   x   Long sitting gastroc strethc 3x30\"     x   Active DF/PF, Ev 30x  lime   x               Ambulation in // bars progressed to RW in open gym x   Cues for step through pattern with good tolerance -   Standing wt shifts 2x10   All in // bars -   Marches 2x10     x   Hip abd and ext 2x10  bilat x    Mini squats 2x20     x    Step ups fwd and lat 2x10 6\"   x    Heel raises  2x10     x    TG squats and HR 3x10 L18   x               Scar massage x     x              Other: Manual:MFR to medial gastroc, scar massage      Vasocompression: 15' moderate compression R ankle 34 deg     Specific Instructions for next treatment: Continue to progress standing ankle strengthening          Treatment Charges: Mins Units   []  Modalities     [x]  Ther Exercise 28 2   [x]  Manual Therapy 15 1   []  Ther Activities     []  Aquatics     [x]  Vasocompression 10 1   []  Other     Total Treatment time 53 4       Assessment: [x] Progressing toward goals. No progressions this date due to increased pain and tightness since last session. Educated pt on the importance of stretching while at home. Pt received manual as listed above with a focus on gastroc tightness. Pt concluded session with vasocompression. [] No change. [] Other:  [] Patient would continue to benefit from skilled physical therapy services in order to: Patient presents with signs and symptoms consistent with post op R ankle pain, limited ROM and strength. Patient would benefit from skilled physical therapy services in order to: Progressively increase R ankle ROM and strength, as well as progressive wt bearing from NWB to wt bearing in CAM boot once allowed per protocol on 4/18.            Goals:                               STG: (to be met in 10 treatments)  1. ? Pain: Decrease pain levels to 1/10 in R ankle   2. ? ROM: Increase flexibility and AROM limitations throughout to equal bilat to reduce difficulty with ADLs, R ankle DF with knee ext to 15deg  3. ? Strength:  Increase R ankle

## 2022-05-18 ENCOUNTER — HOSPITAL ENCOUNTER (OUTPATIENT)
Dept: PHYSICAL THERAPY | Facility: CLINIC | Age: 47
Setting detail: THERAPIES SERIES
Discharge: HOME | End: 2022-05-18
Payer: COMMERCIAL

## 2022-05-18 PROCEDURE — 97016 VASOPNEUMATIC DEVICE THERAPY: CPT

## 2022-05-18 PROCEDURE — 97110 THERAPEUTIC EXERCISES: CPT

## 2022-05-18 PROCEDURE — 97140 MANUAL THERAPY 1/> REGIONS: CPT

## 2022-05-18 NOTE — FLOWSHEET NOTE
[] [facility]) - [facility] &  Therapy  [address]  [phone]  F: [phone] [] [address]  [facility]   [address]  P: [phone]  F: [phone] [] [address]  [address] [facility] [address]  P: [phone]  F: [phone] [x] [address]  P: [phone]  F: [phone] [] [address]  [facility]   [address]   [place]: [phone]  F: [phone]      Physical Therapy Daily Treatment Note    Date:  2022  Patient Name:  Rena Juarez    :  1975  MRN: [id number]  Physician: Dr. 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 : - Dr. Lydia Phelps   Visit# / total visits:      Cancels/No Shows: 0/0    Subjective:    Pain:  [x] Yes  [] No Location:  R ankle/ L knee   Pain Rating: (0-10 scale) 4-5/10 (L knee 7/10)  Pain altered Tx:  [] No  [] Yes  Action:  Comments: Pt continues to have increased soreness and pain, stated the pain is very similar to pre surgery pain and is planning to call doctor post treatment. Objective: Todays Treatment:     Precautions: At postop week 4 (2022)  the patient may begin weightbearing as tolerated in the cam boot, and may begin active range of motion and gentle active assisted range of motion into plantarflexion and dorsiflexion, as well as eversion. No inversion until 12 weeks. May wean out of the cam boot into a lace up ankle brace at the 6-week yenny (22).  Cam boot on 24 hours except for exercise until the 6-week yenny.       Exercise  R ankle post op    Reps/ Time Weight/ Level Comments  NUSTEP 6' L2   x   Long sitting gastroc strethc 3x30\"     x   Active DF/PF, Ev 30x  lime  held resistance x               Ambulation in // bars progressed to RW in open gym x   Cues for step through pattern with good tolerance -   Standing wt shifts 2x10   All in // bars -   Marches 2x10     x   Hip abd and ext 2x10  bilat x    Mini squats 2x20     x    Step ups fwd and lat 2x10 6\"   x    Heel raises  2x10     x    TG squats and HR 3x10 L20   x               Scar massage x     x              Other: Manual:MFR to medial gastroc, scar massage      Vasocompression: 15' moderate compression R ankle 34 deg     Specific Instructions for next treatment: Continue to progress standing ankle strengthening          Treatment Charges: Mins Units   []  Modalities     [x]  Ther Exercise 29 2   [x]  Manual Therapy 15 1   []  Ther Activities     []  Aquatics     [x]  Vasocompression 10 1   []  Other     Total Treatment time 54 4       Assessment: [] Progressing toward goals. [x] No change. No changes or progressions due to increased pain level and tightness. Stressed importance of stretching and massage with pt noting understanding. Ended session with vasocompression to decrease pain level. [] Other:  [] Patient would continue to benefit from skilled physical therapy services in order to: Patient presents with signs and symptoms consistent with post op R ankle pain, limited ROM and strength.   Patient would benefit from skilled physical therapy services in order to: Progressively increase R ankle ROM and strength, as well as progressive wt bearing from NWB to wt bearing in CAM boot once allowed per protocol on 4/18.            Goals:                               STG: (to be met in 10 treatments)  1. ? Pain: Decrease pain levels to 1/10 in R ankle Not Met  2. ? ROM: Increase flexibility and AROM limitations throughout to equal bilat to reduce difficulty with ADLs, R ankle DF with knee ext to 15deg Progress  3. ? Strength: Increase R ankle strength globally to 4/5 once allowed to perform strengthening   4. ? Function: Pt to ambulate in tennis shoe without device once allowed per protocol  MET  5. Independent with Home Exercise Programs MET        LTG: (to be met in 20 treatments)  1. Improve score on assessment tool from 78% impaired to 35% impaired demonstrating an improvement in ADLs  2. Reduce pain levels to 0/10                    Patient goals: To ride motorcycle this summer     Pt. Education:  [x] Yes  [] No  [] Reviewed Prior HEP/Ed  Method of Education: [x] Verbal  [x] Demo  [] Written  Comprehension of Education:  [x] Verbalizes understanding. [x] Demonstrates understanding. [x] Needs review. [] Demonstrates/verbalizes HEP/Ed previously given. Plan: [x] Continue current frequency toward long and short term goals.           Time In: 1100            Time Out: 1159    Electronically signed by:  Winifred Duran PTA

## 2022-05-24 ENCOUNTER — OFFICE VISIT (OUTPATIENT)
Dept: ORTHOPEDIC SURGERY | Age: 47
End: 2022-05-24

## 2022-05-24 ENCOUNTER — HOSPITAL ENCOUNTER (OUTPATIENT)
Dept: PHYSICAL THERAPY | Facility: CLINIC | Age: 47
Setting detail: THERAPIES SERIES
Discharge: HOME | End: 2022-05-24
Payer: COMMERCIAL

## 2022-05-24 VITALS — BODY MASS INDEX: 42.99 KG/M2 | RESPIRATION RATE: 16 BRPM | WEIGHT: 258 LBS | HEIGHT: 65 IN

## 2022-05-24 DIAGNOSIS — M79.2 NEUROPATHIC PAIN: Primary | ICD-10-CM

## 2022-05-24 DIAGNOSIS — M79.2 NEUROPATHIC PAIN: ICD-10-CM

## 2022-05-24 DIAGNOSIS — M25.371 INSTABILITY OF RIGHT ANKLE JOINT: Primary | ICD-10-CM

## 2022-05-24 PROCEDURE — 97110 THERAPEUTIC EXERCISES: CPT

## 2022-05-24 PROCEDURE — 97016 VASOPNEUMATIC DEVICE THERAPY: CPT

## 2022-05-24 PROCEDURE — 97140 MANUAL THERAPY 1/> REGIONS: CPT

## 2022-05-24 PROCEDURE — 99024 POSTOP FOLLOW-UP VISIT: CPT | Performed by: ORTHOPAEDIC SURGERY

## 2022-05-24 RX ORDER — LIDOCAINE 4 G/G
PATCH TOPICAL
Qty: 14 PATCH | Refills: 0 | Status: SHIPPED | OUTPATIENT
Start: 2022-05-24

## 2022-05-24 NOTE — PROGRESS NOTES
[address] [place] [address] AND SPORTS MEDICINE  [address] [address]  Dept: [phone]    Ambulatory Orthopedic Postoperative Visit     Preoperative Diagnosis:   1. Right ankle instability with recurrent sprains  2. Right ankle synovitis  3. Right gastrocnemius equinus contracture  4. Right foot plantar fasciitis  5. Tobacco use  6. Fibromyalgia  7. Body mass index is 43.07 kg/m².     Postoperative Diagnosis:   1. Same      Procedures Performed:  (3/18/2022)  1. Right ankle lateral ligamentous repair   2. Right ankle arthroscopic debridement  3. Right leg gastroc recession, performed through separate incision       SUBJECTIVE:     The patient returns post op from the above stated procedure. Reports doing well overall, denies wound drainage/issues, fevers/chills/night sweats, posterior calf swelling/pain, chest pain, shortness of breath. She is here for an unscheduled follow-up today, as she reports worsened pain with walking. She localizes her pain to her dorsal midfoot and plantar heel, and she reports that she has had this pain in the past before surgery, and describes it as a burning pain. OBJECTIVE:  Resp 16   Ht 5' 5\" (1.651 m)   Wt 258 lb (117 kg)   BMI 42.93 kg/m²    NAD, resting comfortably  Incisions clean/dry/intact, no erythema/dehiscence/drainage  Sensation to light touch grossly intact throughout slightly diminished throughout the medial hindfoot  Warm and well perfused  Grossly neurovascularly intact distally  No signs of infection  No calf swelling/tenderness posteriorly  -Painless ankle range of motion, great dorsiflexion and plantarflexion  -No significant periincisional tenderness  -No significant tenderness over the midfoot or plantar heel      RADIOLOGY:   5/24/2022 No new radiology images today. Prior images reviewed for reference.       ASSESSMENT AND PLAN:     10 weeks s/p above, doing well overall. Her postoperative course has been complicated by some intermittent neuropathic pain medially in the distal leg/calf, which has improved with time; she also has had a flareup of burning pain throughout her dorsal midfoot and plantar heel, which we discussed may be neuropathic in nature (she has had this pain before surgery as well). She has a history of right ankle pain with a history of ankle instability (mildly positive talar tilt, equivocal anterior drawer, varus hindfoot axis; reports a history of rolling her ankle daily), with underlying ankle synovitis, as well as a component of plantar fasciitis with a gastroc equinus contracture.  She also has a history of painful popping in the lateral hindfoot/ankle.     She also has a history of right foot pain, secondary to posterior tibial tendinitis along with pes planus, as well as some degenerative changes of the subtalar joint (a right subtalar joint injection on 12/20/2021 helped her pain approximately 50% for about 3 days) and diffusely throughout the midfoot.       Notably, she has the past medical history as above.  She has a history of fibromyalgia, and a history of tobacco use (1/4 pack of cigarettes per day). Precautions:               --At postop week 4, the patient may begin weightbearing as tolerated in the cam boot, and may begin active range of motion and gentle active assisted range of motion into plantarflexion and dorsiflexion, as well as eversion. No inversion until 12 weeks. May wean out of the cam boot into a lace up ankle brace at the 6-week yenny. Cam boot on 24 hours except for exercise until the 6-week yenny. [x]? Physical Therapy/Home exercises   --desensitization and scar massage added     Immobilization:      []? Splint/Cast               []?  CAM boot                    [x]? Comfortable shoe    [x]?   Other: Lace up ankle brace--may try weaning out of the lace up ankle brace               DVT ppx: [x]?  Early mobilization              []?  Medication as prescribed (Eliquis)                      [x]? No chemical ppx needed; mechanical only             -     Pain control:  Medication as prescribed (dosing and quantity) indicated for acute postoperative pain control             -     Special concerns:          [x]? Tobacco cessation     []?            [x]? Avoid strenuous activity/pain provoking maneuvers and high-impact repetitive exercises      -She was previously ordered gabapentin. At today's visit, she was also ordered topical lidocaine patches, as well as custom flatfoot style orthotics. We discussed that she may also follow-up with her PCP regarding a possible flareup of her fibromyalgia.    -In terms of work, we discussed that she may return to work whenever she feels comfortable      All questions were answered and the patient agrees with the above plan. The patient will return to clinic in the future as needed       No follow-ups on file. No orders of the defined types were placed in this encounter. No orders of the defined types were placed in this encounter. Tom Addison MD  Orthopedic Surgery        Please excuse any typos/errors, as this note was created with the assistance of voice recognition software. While intending to generate a document that actually reflects the content of the visit, the document can still have some errors including those of syntax and sound-a-like substitutions which may escape proof reading. In such instances, actual meaning can be extrapolated by context.

## 2022-05-24 NOTE — FLOWSHEET NOTE
[] [facility]) - [facility] &  Therapy  [address]  [phone]  F: [phone] [] [address]  [facility]   [address]  P: [phone]  F: [phone] [] [address]  [address] [facility]  P: [phone]  F: [phone] [x] [address]  P: [phone]  F: [phone] [] [address]  [facility]   [address]   [place]: [phone]  F: [phone]      Physical Therapy Daily Treatment Note    Date:  2022  Patient Name:  Abdoulaye Bronson    :  1975  MRN: [id number]  Physician: Dr. 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 : - Dr. Rohini Mccullough   Visit# / total visits:      Cancels/No Shows: 0/0    Subjective:    Pain:  [x] Yes  [] No Location:  R ankle/ L knee   Pain Rating: (0-10 scale) 2/10 ankle  Pain altered Tx:  [] No  [] Yes  Action:  Comments: Pt arrives after seeing Dr. Rohini Mccullough this morning to discuss recent increase in pain. Suggested to discontinue the use of ankle brace and mentioned that it could be a flare up of fibromyalgia. Pt currently is not feeling any altered sensation of the foot or ankle but notes she did feel it this morning on the top of her foot and heel. Pt was given prescription for Lidocaine patch to decrease pain. Objective: Todays Treatment:     Precautions: At postop week 4 (2022)  the patient may begin weightbearing as tolerated in the cam boot, and may begin active range of motion and gentle active assisted range of motion into plantarflexion and dorsiflexion, as well as eversion.  No inversion until 12 weeks. May wean out of the cam boot into a lace up ankle brace at the 6-week yenny (5/2/22). Cam boot on 24 hours except for exercise until the 6-week yenny.       Exercise  R ankle post op    Reps/ Time Weight/ Level Comments    NUSTEP 6' L2   x   Long sitting gastroc strethc 3x30\"     x   Active DF/PF, Ev 30x  lime  held resistance x               Ambulation in // bars progressed to RW in open gym x   Cues for step through pattern with good tolerance -   Standing wt shifts 2x10   All in // bars -   Marches 2x10     x   Hip abd and ext 2x10  bilat x    Mini squats 2x20     x    Step ups fwd and lat 2x10 6\"   x    Heel raises  2x10     x    TG squats and HR 3x10 L20   x               Scar massage x     x              Other: Manual:MFR to medial gastroc, scar massage      Vasocompression: 15' moderate compression R ankle 34 deg     Specific Instructions for next treatment: Continue to progress standing ankle strengthening          Treatment Charges: Mins Units   []  Modalities     [x]  Ther Exercise 30 2   [x]  Manual Therapy 15 1   []  Ther Activities     []  Aquatics     [x]  Vasocompression 10 1   []  Other     Total Treatment time 55 4       Assessment: [x] Progressing toward goals. Initiated on [employer]rin[employer] Railing followed by exercises as listed above. No progression done this date d/t pt performing exercises without brace for first time. Pt performed exercises with good tolerance and no increase in pain. Continued to manual listed above with tightness throughout gastroc noted. Scar massage was also performed along with desensitization of dorsum of foot. Ended with vasocompression to reduce inflammation. [] No change. [] Other:  [] Patient would continue to benefit from skilled physical therapy services in order to: Patient presents with signs and symptoms consistent with post op R ankle pain, limited ROM and strength.   Patient would benefit from skilled physical therapy services in order to: Progressively increase R ankle ROM and strength, as well as progressive wt bearing from NWB to wt bearing in CAM boot once allowed per protocol on 4/18.            Goals:                               STG: (to be met in 10 treatments)  1. ? Pain: Decrease pain levels to 1/10 in R ankle Not Met  2. ? ROM: Increase flexibility and AROM limitations throughout to equal bilat to reduce difficulty with ADLs, R ankle DF with knee ext to 15deg Progress  3. ? Strength: Increase R ankle strength globally to 4/5 once allowed to perform strengthening   4. ? Function: Pt to ambulate in tennis shoe without device once allowed per protocol  MET  5. Independent with Home Exercise Programs MET        LTG: (to be met in 20 treatments)  1. Improve score on assessment tool from 78% impaired to 35% impaired demonstrating an improvement in ADLs  2. Reduce pain levels to 0/10                    Patient goals: To ride motorcycle this summer     Pt. Education:  [x] Yes  [] No  [] Reviewed Prior HEP/Ed  Method of Education: [x] Verbal  [x] Demo  [] Written  Comprehension of Education:  [x] Verbalizes understanding. [x] Demonstrates understanding. [x] Needs review. [] Demonstrates/verbalizes HEP/Ed previously given. Plan: [x] Continue current frequency toward long and short term goals. Time In: 1100am            Time Out: 1155am    Electronically signed by:  DAGMAR Craig Evaluation/treatment performed by Student PT under the supervision of co-signing PT who agrees with all evaluation/treatment and documentation.

## 2022-05-26 ENCOUNTER — HOSPITAL ENCOUNTER (OUTPATIENT)
Dept: PHYSICAL THERAPY | Facility: CLINIC | Age: 47
Setting detail: THERAPIES SERIES
Discharge: HOME | End: 2022-05-26
Payer: COMMERCIAL

## 2022-05-26 PROCEDURE — 97016 VASOPNEUMATIC DEVICE THERAPY: CPT

## 2022-05-26 PROCEDURE — 97140 MANUAL THERAPY 1/> REGIONS: CPT

## 2022-05-26 PROCEDURE — 97110 THERAPEUTIC EXERCISES: CPT

## 2022-05-26 NOTE — FLOWSHEET NOTE
[] [facility]) [facility] &  Therapy  [address]  [phone]  F: [phone] [] [address]  [employer] [address]   [address]  P: [phone]  F: [phone] [] [address]  [address]  P: [phone]  F: [phone] [x] [address]  P: [phone]  F: [phone] [] [address]  [facility]   [address]   [place]: [phone]  F: [phone]      Physical Therapy Daily Treatment Note    Date:  2022  Patient Name:  Brown Galindo    :  1975  MRN: [id number]  Physician: Dr. 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 : - Dr. Raman Hand   Visit# / total visits:      Cancels/No Shows: 0/0    Subjective:    Pain:  [x] Yes  [] No Location:  R ankle/ L knee   Pain Rating: (0-10 scale) 2/10 ankle  Pain altered Tx:  [] No  [] Yes  Action:  Comments: Pt reported increase ankle soreness but stated that it was tolerable. No change in overall pain level. Objective: Todays Treatment:     Precautions: At postop week 4 (2022)  the patient may begin weightbearing as tolerated in the cam boot, and may begin active range of motion and gentle active assisted range of motion into plantarflexion and dorsiflexion, as well as eversion. No inversion until 12 weeks. May wean out of the cam boot into a lace up ankle brace at the 6-week yenny (22).  Cam boot on 24 hours except for exercise until the 6-week yenny.       Exercise  R ankle post op    Reps/ Time Weight/ Level Comments    NUSTEP 6' L2   x   Long sitting gastroc strethc 3x30\"     x Active DF/PF, Ev 30x  lime  held resistance x               Ambulation in // bars progressed to RW in open gym x   Cues for step through pattern with good tolerance -   Standing wt shifts 2x10   All in // bars -   Marches 2x10     x   Hip abd and ext 2x10  bilat x    Mini squats 2x20     x    Step ups fwd and lat 2x10 6\"   x    Heel raises  2x10     x    TG squats and HR 3x10 L25   x               Scar massage x     x              Other: Manual:MFR to medial gastroc, scar massage      Vasocompression: 15' moderate compression R ankle 34 deg     Specific Instructions for next treatment: Continue to progress standing ankle strengthening          Treatment Charges: Mins Units   []  Modalities     [x]  Ther Exercise 28 2   [x]  Manual Therapy 15 1   []  Ther Activities     []  Aquatics     [x]  Vasocompression 10 1   []  Other     Total Treatment time 53 4       Assessment: [x] Progressing toward goals. Pt with good tolerance to exercise, stating she still has an increase in soreness but is able to complete all activities. No progressions due to soreness, plans to increase standing activities next session. Pt received manual for R calf due to increased tightness. Pt concluded session with vasocompression to decrease pain level. [] No change. [] Other:  [] Patient would continue to benefit from skilled physical therapy services in order to: Patient presents with signs and symptoms consistent with post op R ankle pain, limited ROM and strength.   Patient would benefit from skilled physical therapy services in order to: Progressively increase R ankle ROM and strength, as well as progressive wt bearing from NWB to wt bearing in CAM boot once allowed per protocol on 4/18.            Goals:                               STG: (to be met in 10 treatments)  1. ? Pain: Decrease pain levels to 1/10 in R ankle Not Met  2. ? ROM: Increase flexibility and AROM limitations throughout to equal bilat to reduce difficulty with ADLs, R ankle DF with knee ext to 15deg Progress  3. ? Strength: Increase R ankle strength globally to 4/5 once allowed to perform strengthening   4. ? Function: Pt to ambulate in tennis shoe without device once allowed per protocol  MET  5. Independent with Home Exercise Programs MET        LTG: (to be met in 20 treatments)  1. Improve score on assessment tool from 78% impaired to 35% impaired demonstrating an improvement in ADLs  2. Reduce pain levels to 0/10                    Patient goals: To ride motorcycle this summer     Pt. Education:  [x] Yes  [] No  [] Reviewed Prior HEP/Ed  Method of Education: [x] Verbal  [x] Demo  [] Written  Comprehension of Education:  [x] Verbalizes understanding. [x] Demonstrates understanding. [x] Needs review. [] Demonstrates/verbalizes HEP/Ed previously given. Plan: [x] Continue current frequency toward long and short term goals.           Time In: 1057          Time Out: 1156    Electronically signed by:  Oksana Person PTA,

## 2022-05-31 ENCOUNTER — HOSPITAL ENCOUNTER (OUTPATIENT)
Dept: PHYSICAL THERAPY | Facility: CLINIC | Age: 47
Setting detail: THERAPIES SERIES
Discharge: HOME | End: 2022-05-31
Payer: COMMERCIAL

## 2022-05-31 NOTE — FLOWSHEET NOTE
[] [address]  [address]    [phone]  F: [phone]   [] [address]  [facility] [address]   [address]  P: [phone]  F: [phone]  [] [address]  [address] [facility]  P: [phone]  F: [phone] [x] [address]  P: [phone]  F: [phone]  [] [address]  [address] [facility] [address]   [address]   [place]: [phone]  F: [phone]   [] [place] [id number] [facility] [address]  [place]: [phone]   F: [phone]     Physical Therapy Cancel/No Show note    Date: 2022  Patient: Rena Juarez  : 1975  MRN: [id number]    Cancels/No Shows to date:      For today's appointment patient:    [x]  Cancelled    [] Rescheduled appointment    [] No-show     Reason given by patient:    []  Patient ill    []  Conflicting appointment    [] No transportation      [] Conflict with work    [x] No reason given    [] Weather related    [] COVID-19    [] Other:      Comments:        [x] Next appointment was confirmed    Electronically signed by: Boone Burns

## 2022-06-02 ENCOUNTER — HOSPITAL ENCOUNTER (OUTPATIENT)
Dept: PHYSICAL THERAPY | Facility: CLINIC | Age: 47
Setting detail: THERAPIES SERIES
Discharge: HOME | End: 2022-06-02
Payer: COMMERCIAL

## 2022-06-02 PROCEDURE — 97016 VASOPNEUMATIC DEVICE THERAPY: CPT

## 2022-06-02 PROCEDURE — 97110 THERAPEUTIC EXERCISES: CPT

## 2022-06-02 PROCEDURE — 97140 MANUAL THERAPY 1/> REGIONS: CPT

## 2022-06-02 NOTE — FLOWSHEET NOTE
[] [facility]) [facility] &  Therapy  [address]  [phone]  F: [phone] [] [address]  [employer] [address]   [address]  P: [phone]  F: [phone] [] [address]  [address] [facility] [address] [facility]  P: [phone]  F: [phone] [x] [address]  P: [phone]  F: [phone] [] [address]  [facility]   [address]   [place]: [phone]  F: [phone]      Physical Therapy Daily Treatment Note    Date:  2022  Patient Name:  Js Cowan    :  1975  MRN: [id number]  Physician: Dr. 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 : - Dr. Katie Gray   Visit# / total visits:      Cancels/No Shows: 0/0    Subjective:    Pain:  [x] Yes  [] No Location:  R ankle/ L knee   Pain Rating: (0-10 scale) 4-5/10 ankle  Pain altered Tx:  [] No  [] Yes  Action:  Comments: Pt reported moderate level of pain in the right ankle due to soreness and increased activity. Pt stated she has been stressed a lot lately as well. Objective: Todays Treatment:     Precautions: At postop week 4 (2022)  the patient may begin weightbearing as tolerated in the cam boot, and may begin active range of motion and gentle active assisted range of motion into plantarflexion and dorsiflexion, as well as eversion. No inversion until 12 weeks. May wean out of the cam boot into a lace up ankle brace at the 6-week yenny (22).  Cam boot on 24 hours except for exercise until the 6-week yenny.       Exercise  R ankle post op    Reps/ Time Weight/ Level Comments    NUSTEP 6' L2   x   Long sitting gastroc strethc 3x30\"     x   Active DF/PF, Ev 30x  lime  held resistance x               Ambulation in // bars progressed to RW in open gym x   Cues for step through pattern with good tolerance -   Standing wt shifts 2x10   All in // bars -   Marches 2x10     x   Hip abd and ext 2x10  bilat x    Mini squats 2x20     x    Step ups fwd and lat 2x10 6\"   x    Heel raises  2x10     x    TG squats and HR 3x10 L25   x               Scar massage x     x              Other: Manual:MFR to medial gastroc, scar massage, A/P mobs to ankle      Vasocompression: 15' moderate compression R ankle 34 deg     Specific Instructions for next treatment: Continue to progress standing ankle strengthening          Treatment Charges: Mins Units   []  Modalities     [x]  Ther Exercise 25 2   [x]  Manual Therapy 19 1   []  Ther Activities     []  Aquatics     [x]  Vasocompression 10 1   []  Other     Total Treatment time 54 4       Assessment: [x] Progressing toward goals. Pt began treatment with nustep, then received A/P mobs to affected ankle to increase space and decrease pain level. Continued with therex listed above with no progressions. Pt received MFR to affected gastroc to decrease tension and concluded session with vasocompression to decrease pain and swelling. [] No change. [] Other:  [] Patient would continue to benefit from skilled physical therapy services in order to: Patient presents with signs and symptoms consistent with post op R ankle pain, limited ROM and strength.   Patient would benefit from skilled physical therapy services in order to: Progressively increase R ankle ROM and strength, as well as progressive wt bearing from NWB to wt bearing in CAM boot once allowed per protocol on 4/18.            Goals:                               STG: (to be met in 10 treatments)  1. ? Pain: Decrease pain levels to 1/10 in R ankle Not Met  2. ? ROM: Increase flexibility and AROM limitations throughout to equal bilat to reduce difficulty with ADLs, R ankle DF with knee ext to 15deg Progress  3. ? Strength: Increase R ankle strength globally to 4/5 once allowed to perform strengthening   4. ? Function: Pt to ambulate in tennis shoe without device once allowed per protocol  MET  5. Independent with Home Exercise Programs MET        LTG: (to be met in 20 treatments)  1. Improve score on assessment tool from 78% impaired to 35% impaired demonstrating an improvement in ADLs  2. Reduce pain levels to 0/10                    Patient goals: To ride motorcycle this summer     Pt. Education:  [x] Yes  [] No  [] Reviewed Prior HEP/Ed  Method of Education: [x] Verbal  [x] Demo  [] Written  Comprehension of Education:  [x] Verbalizes understanding. [x] Demonstrates understanding. [x] Needs review. [] Demonstrates/verbalizes HEP/Ed previously given. Plan: [x] Continue current frequency toward long and short term goals.           Time In: 1058          Time Out: [address]    Electronically signed by:  Guillermo Haney PTA,

## 2022-06-07 ENCOUNTER — HOSPITAL ENCOUNTER (OUTPATIENT)
Dept: PHYSICAL THERAPY | Facility: CLINIC | Age: 47
Setting detail: THERAPIES SERIES
Discharge: HOME | End: 2022-06-07
Payer: COMMERCIAL

## 2022-06-07 PROCEDURE — 97110 THERAPEUTIC EXERCISES: CPT

## 2022-06-07 PROCEDURE — 97140 MANUAL THERAPY 1/> REGIONS: CPT

## 2022-06-07 PROCEDURE — 97016 VASOPNEUMATIC DEVICE THERAPY: CPT

## 2022-06-07 NOTE — FLOWSHEET NOTE
[] [facility]) [facility] &  Therapy  [address]  [phone]  F: [phone] [] [address]  [facility]   [address]  P: [phone]  F: [phone] [] [address]  [address] [facility] [address]  P: [phone]  F: [phone] [x] [address]  P: [phone]  F: [phone] [] [address]  [facility]   [address]   [place]: [phone]  F: [phone]      Physical Therapy Daily Treatment Note    Date:  2022  Patient Name:  Antony Tran    :  1975  MRN: [id number]  Physician: Dr. 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 : - Dr. Lilly Gregg   Visit# / total visits: 15/20     Cancels/No Shows: 0/0    Subjective:    Pain:  [x] Yes  [] No Location:  R ankle/ L knee   Pain Rating: (0-10 scale) 4-5/10 ankle  Pain altered Tx:  [] No  [] Yes  Action:  Comments: Pt reported she continues to have soreness and that it has not gone away. Stated she is trying to get in with her primary doctor with [facility] as she wants a second opinion about her ankle. Objective: Todays Treatment:     Precautions: At postop week 4 (2022)  the patient may begin weightbearing as tolerated in the cam boot, and may begin active range of motion and gentle active assisted range of motion into plantarflexion and dorsiflexion, as well as eversion. No inversion until 12 weeks. May wean out of the cam boot into a lace up ankle brace at the 6-week yenny (22).  Cam boot on 24 hours except for exercise until the 6-week yenny.       Exercise  R ankle post op    Reps/ Time Weight/ Level Comments    NUSTEP 6' L2   x   Long sitting gastroc strethc 3x30\"     x   Active DF/PF, Ev 30x  lime  held resistance x               Ambulation in // bars progressed to RW in open gym x   Cues for step through pattern with good tolerance -   Standing wt shifts 2x10   All in // bars -   Marches 2x10     x   Hip abd and ext 2x10  bilat x    Mini squats 2x20     x    Step ups fwd and lat 2x10 6\"   x    Heel raises  2x10     x    TG squats and HR 3x10 L25   x               Scar massage x     x              Other: Manual:MFR to medial gastroc, scar massage, A/P mobs to ankle      Vasocompression: 15' moderate compression R ankle 34 deg     Specific Instructions for next treatment: Continue to progress standing ankle strengthening          Treatment Charges: Mins Units   []  Modalities     [x]  Ther Exercise 25 2   [x]  Manual Therapy 21 1   []  Ther Activities     []  Aquatics     [x]  Vasocompression 10 1   []  Other     Total Treatment time 56 4       Assessment: [x] Progressing toward goals. Pt continued with activities listed above reporting continued soreness. Increased step mikaela for step ups with moderate tolerance stating that she could feel more pain/soreness with higher stair. Pt concluded treatment with manual and vasocompression to decrease tightness and pain level. [] No change. [] Other:  [] Patient would continue to benefit from skilled physical therapy services in order to: Patient presents with signs and symptoms consistent with post op R ankle pain, limited ROM and strength.   Patient would benefit from skilled physical therapy services in order to: Progressively increase R ankle ROM and strength, as well as progressive wt bearing from NWB to wt bearing in CAM boot once allowed per protocol on 4/18.            Goals:                               STG: (to be met in 10 treatments)  1. ? Pain: Decrease pain levels to 1/10 in R ankle Not Met  2. ? ROM: Increase flexibility and AROM limitations throughout to equal bilat to reduce difficulty with ADLs, R ankle DF with knee ext to 15deg Progress  3. ? Strength: Increase R ankle strength globally to 4/5 once allowed to perform strengthening   4. ? Function: Pt to ambulate in tennis shoe without device once allowed per protocol  MET  5. Independent with Home Exercise Programs MET        LTG: (to be met in 20 treatments)  1. Improve score on assessment tool from 78% impaired to 35% impaired demonstrating an improvement in ADLs  2. Reduce pain levels to 0/10                    Patient goals: To ride motorcycle this summer     Pt. Education:  [x] Yes  [] No  [] Reviewed Prior HEP/Ed  Method of Education: [x] Verbal  [x] Demo  [] Written  Comprehension of Education:  [x] Verbalizes understanding. [x] Demonstrates understanding. [x] Needs review. [] Demonstrates/verbalizes HEP/Ed previously given. Plan: [x] Continue current frequency toward long and short term goals.           Time In: 1058          Time Out: 1200    Electronically signed by:  Kerry Gutierrez PTA,

## 2022-06-09 ENCOUNTER — HOSPITAL ENCOUNTER (OUTPATIENT)
Dept: PHYSICAL THERAPY | Facility: CLINIC | Age: 47
Setting detail: THERAPIES SERIES
Discharge: HOME | End: 2022-06-09
Payer: COMMERCIAL

## 2022-06-09 PROCEDURE — 97016 VASOPNEUMATIC DEVICE THERAPY: CPT

## 2022-06-09 PROCEDURE — 97110 THERAPEUTIC EXERCISES: CPT

## 2022-06-09 PROCEDURE — 97140 MANUAL THERAPY 1/> REGIONS: CPT

## 2022-06-09 NOTE — FLOWSHEET NOTE
[] [facility]) [facility] &  Therapy  [address]  [phone]  F: [phone] [] [address]  [employer] [address]   [address]  P: [phone]  F: [phone] [] [address]  [address] [facility] [address]  P: [phone]  F: [phone] [x] [address]  P: [phone]  F: [phone] [] [address]  [facility]   [address]   [place]: [phone]  F: [phone]      Physical Therapy Daily Treatment Note    Date:  2022  Patient Name:  Bel Suarez    :  1975  MRN: [id number]  Physician: Dr. 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 : - Dr. Ge Turpin   Visit# / total visits:      Cancels/No Shows: 0/0    Subjective:    Pain:  [x] Yes  [] No Location:  R ankle/ L knee   Pain Rating: (0-10 scale) 4-5/10 ankle  Pain altered Tx:  [] No  [] Yes  Action:  Comments: Pt reported that she continues to have ankle pain. Stated she is going to see orthotics later today to be fitted. Objective: Todays Treatment:     Precautions: At postop week 4 (2022)  the patient may begin weightbearing as tolerated in the cam boot, and may begin active range of motion and gentle active assisted range of motion into plantarflexion and dorsiflexion, as well as eversion. No inversion until 12 weeks. May wean out of the cam boot into a lace up ankle brace at the 6-week yenny (22).  Cam boot on 24 hours except for exercise until the 6-week yenny.       Exercise  R ankle post op    Reps/ Time Weight/ Level Comments    NUSTEP 6' L2   x   Long sitting gastroc strethc 3x30\"     x   Active DF/PF, Ev 30x  lime  held resistance x               Ambulation in // bars progressed to RW in open gym x   Cues for step through pattern with good tolerance -   Standing wt shifts 2x10   All in // bars -   Marches 2x10     x   Hip abd and ext 2x10  bilat x    Mini squats 2x20     x    Step ups fwd and lat 2x10 8\"   x    Heel raises  2x10     x    TG squats and HR 3x10 L25   x               Scar massage x     x              Other: Manual:MFR to medial gastroc, scar massage, A/P mobs to ankle      Vasocompression: 15' moderate compression R ankle 34 deg     Specific Instructions for next treatment: Continue to progress standing ankle strengthening          Treatment Charges: Mins Units   []  Modalities     [x]  Ther Exercise 25 2   [x]  Manual Therapy 21 1   []  Ther Activities     []  Aquatics     [x]  Vasocompression 10 1   []  Other     Total Treatment time 56 4       Assessment: [x] Progressing toward goals. Pt with good tolerance to activities as focus was on strengthening and decreasing muscle tension. Continued with manual as listed and received vasocompression to decrease soreness. [] No change. [] Other:  [] Patient would continue to benefit from skilled physical therapy services in order to: Patient presents with signs and symptoms consistent with post op R ankle pain, limited ROM and strength. Patient would benefit from skilled physical therapy services in order to: Progressively increase R ankle ROM and strength, as well as progressive wt bearing from NWB to wt bearing in CAM boot once allowed per protocol on 4/18.            Goals:                               STG: (to be met in 10 treatments)  1. ? Pain: Decrease pain levels to 1/10 in R ankle Not Met  2. ? ROM: Increase flexibility and AROM limitations throughout to equal bilat to reduce difficulty with ADLs, R ankle DF with knee ext to 15deg Progress  3. ? Strength:  Increase R ankle strength globally to 4/5 once allowed to perform strengthening   4. ? Function: Pt to ambulate in tennis shoe without device once allowed per protocol  MET  5. Independent with Home Exercise Programs MET        LTG: (to be met in 20 treatments)  1. Improve score on assessment tool from 78% impaired to 35% impaired demonstrating an improvement in ADLs  2. Reduce pain levels to 0/10                    Patient goals: To ride motorcycle this summer     Pt. Education:  [x] Yes  [] No  [] Reviewed Prior HEP/Ed  Method of Education: [x] Verbal  [x] Demo  [] Written  Comprehension of Education:  [x] Verbalizes understanding. [x] Demonstrates understanding. [x] Needs review. [] Demonstrates/verbalizes HEP/Ed previously given. Plan: [x] Continue current frequency toward long and short term goals.           Time In: 1100          Time Out: 1201    Electronically signed by:  Shiraz Carrion PTA,

## 2022-06-14 ENCOUNTER — HOSPITAL ENCOUNTER (OUTPATIENT)
Dept: PHYSICAL THERAPY | Facility: CLINIC | Age: 47
Setting detail: THERAPIES SERIES
Discharge: HOME | End: 2022-06-14
Payer: COMMERCIAL

## 2022-06-14 PROCEDURE — 97140 MANUAL THERAPY 1/> REGIONS: CPT

## 2022-06-14 PROCEDURE — 97110 THERAPEUTIC EXERCISES: CPT

## 2022-06-14 PROCEDURE — 97016 VASOPNEUMATIC DEVICE THERAPY: CPT

## 2022-06-14 NOTE — FLOWSHEET NOTE
[] [facility]) - [facility] &  Therapy  [address]  [phone]  F: [phone] [] [address]  [facility]   [address]  P: [phone]  F: [phone] [] [address]  [address] [facility]  P: [phone]  F: [phone] [x] [address]  P: [phone]  F: [phone] [] [address]  [facility]   [address]   [place]: [phone]  F: [phone]      Physical Therapy Daily Treatment Note    Date:  2022  Patient Name:  Kassidy Davenport    :  1975  MRN: [id number]  Physician: Dr. 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 : - Dr. Yamini Coyle   Visit# / total visits:      Cancels/No Shows: 0/0    Subjective:    Pain:  [x] Yes  [] No Location:  R ankle/ L knee   Pain Rating: (0-10 scale) 6-7/10 ankle  Pain altered Tx:  [] No  [] Yes  Action:  Comments: Pt reported that she is very sore from last visit and activity over the weekend. Pt got fitted with orthotics last week and is waiting for them to arrive. Is meeting with primary doctor this week and may schedule with referring physician after. Objective: Todays Treatment:     Precautions: At postop week 4 (2022)  the patient may begin weightbearing as tolerated in the cam boot, and may begin active range of motion and gentle active assisted range of motion into plantarflexion and dorsiflexion, as well as eversion. No inversion until 12 weeks. May wean out of the cam boot into a lace up ankle brace at the 6-week yenny (5/2/22).  Cam boot on 24 hours except for exercise until the ADLs, R ankle DF with knee ext to 15deg Progress  3. ? Strength: Increase R ankle strength globally to 4/5 once allowed to perform strengthening   4. ? Function: Pt to ambulate in tennis shoe without device once allowed per protocol  MET  5. Independent with Home Exercise Programs MET        LTG: (to be met in 20 treatments)  1. Improve score on assessment tool from 78% impaired to 35% impaired demonstrating an improvement in ADLs  2. Reduce pain levels to 0/10                    Patient goals: To ride motorcycle this summer     Pt. Education:  [x] Yes  [] No  [] Reviewed Prior HEP/Ed  Method of Education: [x] Verbal  [x] Demo  [] Written  Comprehension of Education:  [x] Verbalizes understanding. [x] Demonstrates understanding. [x] Needs review. [] Demonstrates/verbalizes HEP/Ed previously given. Plan: [x] Continue current frequency toward long and short term goals.           Time In: 1100          Time Out: 1200    Electronically signed by:  Fadi Daly, PTA,

## 2022-06-16 ENCOUNTER — HOSPITAL ENCOUNTER (OUTPATIENT)
Dept: PHYSICAL THERAPY | Facility: CLINIC | Age: 47
Setting detail: THERAPIES SERIES
Discharge: HOME | End: 2022-06-16
Payer: COMMERCIAL

## 2022-06-16 NOTE — FLOWSHEET NOTE
[] [facility]) - [facility] &  Therapy  [address]    [phone]  F: [phone]   [] [address]  [facility]   [address]  P: [phone]  F: [phone]  [] [address]  [address]  P: [phone]  F: [phone] [x] [address]  P: [phone]  F: [phone]  [] [address]  [address] [facility] [address]   [address]   [place]: [phone]  F: [phone]   [] [facility]  [address]  [place]: [phone]   F: [phone]     Physical Therapy Cancel/No Show note    Date: 2022  Patient: Abdelrahman Guillaume  : 1975  MRN: [id number]    Cancels/No Shows to date:      For today's appointment patient:    [x]  Cancelled    [] Rescheduled appointment    [] No-show     Reason given by patient:    []  Patient ill    []  Conflicting appointment    [] No transportation      [] Conflict with work    [] No reason given    [] Weather related    [] COVID-19    [x] Other:      Comments: Pt started new medication and feels unsafe to drive confirmed appt for next week.         [x] Next appointment was confirmed    Electronically signed by: Td Lezama PTA

## 2022-06-21 ENCOUNTER — HOSPITAL ENCOUNTER (OUTPATIENT)
Dept: PHYSICAL THERAPY | Facility: CLINIC | Age: 47
Setting detail: THERAPIES SERIES
Discharge: HOME | End: 2022-06-21
Payer: COMMERCIAL

## 2022-06-21 PROCEDURE — 97140 MANUAL THERAPY 1/> REGIONS: CPT

## 2022-06-21 PROCEDURE — 97110 THERAPEUTIC EXERCISES: CPT

## 2022-06-21 PROCEDURE — 97016 VASOPNEUMATIC DEVICE THERAPY: CPT

## 2022-06-21 NOTE — FLOWSHEET NOTE
[] [facility]) - [facility] &  Therapy  [address]  [phone]  F: [phone] [] [address]  [facility]   [address]  P: [phone]  F: [phone] [] [address]  [address] [facility] [address]  P: [phone]  F: [phone] [x] [address]  P: [phone]  F: [phone] [] [address]  [facility]   [address]   [place]: [phone]  F: [phone]      Physical Therapy Daily Treatment Note    Date:  2022  Patient Name:  Lou Mobley    :  1975  MRN: [id number]  Physician: Dr. 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 : - Dr. José Luis Cazares   Visit# / total visits:      Cancels/No Shows: 0/0    Subjective:    Pain:  [x] Yes  [] No Location:  R ankle/ L knee   Pain Rating: (0-10 scale) 0/10 ankle  Pain altered Tx:  [] No  [] Yes  Action:  Comments: Patient states that she has been feeling slightly better after starting 300 mg of gabapentin 3 times per day. The medication make the patient very tired which is why she had canceled her last appointment. Objective: Todays Treatment:     Precautions: At postop week 4 (2022)  the patient may begin weightbearing as tolerated in the cam boot, and may begin active range of motion and gentle active assisted range of motion into plantarflexion and dorsiflexion, as well as eversion. No inversion until 12 weeks. May wean out of the cam boot into a lace up ankle brace at the 6-week yenny (22).  Cam boot on 24 hours except for exercise until the 6-week yenny.       Exercise  R ankle post op    Reps/ Time Weight/ Level Comments   NUSTEP 6' L5   x   Long sitting gastroc strethc 3x30\"    - 3 with knee straight    - 3 with knee bent  x   Active DF/PF, Ev 2 x 15 Blue   held resistance x   Inversion  2 x 15 Peach   x    Ambulation in // bars progressed to RW in open gym x   Cues for step through pattern with good tolerance -   Standing wt shifts 2x10   All in // bars -   Marches 2x10     -   Hip abd and ext 2x10 Orange bilat x   Mini squats 2x20     x   Step ups fwd and lat 2x10 8\"   x   Heel raises  2x10     x   TG squats and HR 3x10 L22   x               Scar massage                    Other: Manual:MFR to medial gastroc, scar massage, A/P mobs to ankle      Vasocompression: 15' moderate compression R ankle 34 deg     Specific Instructions for next treatment: Continue to progress standing ankle strengthening          Treatment Charges: Mins Units   []  Modalities     [x]  Ther Exercise 30 2   [x]  Manual Therapy 15 1   []  Ther Activities     []  Aquatics     [x]  Vasocompression 10 1   []  Other     Total Treatment time 55 4       Assessment: [x] Progressing toward goals. Continued with manual as listed above with increased focus on the soleus and posterior tibialis. Progressed ankle strengthening exercises to the blue resistance band and incorporated light inversion strengthening as allowed by post operative instructions. Added resistance to standing hip abduction and extension. Poor closed chain dorsiflexion control was observed when assessing the patient's gait. Supplied verbal cues during squatting activities to control dorsiflexion. Patient finished the session with 3/10 pain and fatigue therefore completed the session with vaso compression. [] No change. [] Other:  [] Patient would continue to benefit from skilled physical therapy services in order to: Patient presents with signs and symptoms consistent with post op R ankle pain, limited ROM and strength.   Patient would benefit from skilled physical therapy services in order to: Progressively increase R ankle ROM and strength, as well as progressive wt bearing from NWB to wt bearing in CAM boot once allowed per protocol on 4/18.            Goals:                               STG: (to be met in 10 treatments)  1. ? Pain: Decrease pain levels to 1/10 in R ankle Not Met  2. ? ROM: Increase flexibility and AROM limitations throughout to equal bilat to reduce difficulty with ADLs, R ankle DF with knee ext to 15deg Progress  3. ? Strength: Increase R ankle strength globally to 4/5 once allowed to perform strengthening   4. ? Function: Pt to ambulate in tennis shoe without device once allowed per protocol  MET  5. Independent with Home Exercise Programs MET        LTG: (to be met in 20 treatments)  1. Improve score on assessment tool from 78% impaired to 35% impaired demonstrating an improvement in ADLs  2. Reduce pain levels to 0/10                    Patient goals: To ride motorcycle this summer     Pt. Education:  [x] Yes  [] No  [] Reviewed Prior HEP/Ed  Method of Education: [x] Verbal  [x] Demo  [] Written  Comprehension of Education:  [x] Verbalizes understanding. [x] Demonstrates understanding. [x] Needs review. [] Demonstrates/verbalizes HEP/Ed previously given. Plan: [x] Continue current frequency toward long and short term goals. Time In: 1100          Time Out: 1206    Electronically signed by:   Geoffrey Alex, PT,

## 2022-06-23 ENCOUNTER — HOSPITAL ENCOUNTER (OUTPATIENT)
Dept: PHYSICAL THERAPY | Facility: CLINIC | Age: 47
Setting detail: THERAPIES SERIES
Discharge: HOME | End: 2022-06-23
Payer: COMMERCIAL

## 2022-06-23 PROCEDURE — 97110 THERAPEUTIC EXERCISES: CPT

## 2022-06-23 PROCEDURE — 97016 VASOPNEUMATIC DEVICE THERAPY: CPT

## 2022-06-23 NOTE — FLOWSHEET NOTE
[] [facility]) - [facility] &  Therapy  [address]  [phone]  F: [phone] [] [address]  [facility]   [address]  P: [phone]  F: [phone] [] [address]  [address]  P: [phone]  F: [phone] [x] [address]  P: [phone]  F: [phone] [] [address]  [facility]   [address]   [place]: [phone]  F: [phone]      Physical Therapy Daily Treatment Note    Date:  2022  Patient Name:  Pat Woodard    :  1975  MRN: [id number]  Physician: Dr. 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 : - Dr. Dangelo Ba   Visit# / total visits:      Cancels/No Shows: 0/0    Subjective:    Pain:  [x] Yes  [] No Location:  R ankle/ L knee   Pain Rating: (0-10 scale) 3/10 ankle  Pain altered Tx:  [] No  [] Yes  Action:  Comments: Patient arrives stating that her gabapentin is helping to decrease the pain slightly, however it still remains. Therapist discussed with patient that a return to visit to MD is warranted d/t reaching a plateau with therapy; pt in agreemtent and plans to follow up with Dr. Dangelo Ba. Objective: Todays Treatment:     Precautions: At postop week 4 (2022)  the patient may begin weightbearing as tolerated in the cam boot, and may begin active range of motion and gentle active assisted range of motion into plantarflexion and dorsiflexion, as well as eversion. No inversion until 12 weeks. May wean out of the cam boot into a lace up ankle brace at the 6-week yenny (22).  Cam boot on 24 hours except for exercise until the 6-week yenny.       Exercise  R ankle post op    Reps/ Time Weight/ Level Comments   NUSTEP 6' L5   x   Long sitting gastroc strethc 3x30\"    - 3 with knee straight    - 3 with knee bent  x   Active DF/PF, Ev 2 x 15 Blue   held resistance x   Inversion  2 x 15 Peach   x    Ambulation in // bars progressed to RW in open gym x   Cues for step through pattern with good tolerance -   Standing wt shifts 2x10   All in // bars -   Marches 2x10     -   Hip abd and ext 2x10 Orange bilat x   Mini squats 2x20     x   Step ups fwd and lat 2x10 8\"   x   Heel raises  2x10     x   TG squats and HR 3x10 L22   x               Scar massage                    Other: Manual:MFR to medial gastroc, scar massage, A/P mobs to ankle      Vasocompression: 15' moderate compression R ankle 34 deg     Specific Instructions for next treatment: Continue to progress standing ankle strengthening          Treatment Charges: Mins Units   []  Modalities     [x]  Ther Exercise 40 3   []  Manual Therapy     []  Ther Activities     []  Aquatics     [x]  Vasocompression 15 1   []  Other     Total Treatment time 55 4       Assessment: [x] Progressing toward goals. Initiated treatment with Kaye Matute followed by slant board stretches. Reviewed all standing exercises for patient to complete at home, pt demonstrated good technique and understanding of all. Ended with vasocompression. Plan is for patient to follow up with Dr. Dangelo Ba and place PT on hold d/t reaching a plateau. Pt in agreement with POC. [] No change. [] Other:  [] Patient would continue to benefit from skilled physical therapy services in order to: Patient presents with signs and symptoms consistent with post op R ankle pain, limited ROM and strength.   Patient would benefit from skilled physical therapy services in order to: Progressively increase R ankle ROM and strength, as well as progressive wt bearing from NWB to wt bearing in CAM boot once allowed per protocol on 4/18.            Goals:                               STG: (to be met in 10 treatments)  1. ? Pain: Decrease pain levels to 1/10 in R ankle Not Met  2. ? ROM: Increase flexibility and AROM limitations throughout to equal bilat to reduce difficulty with ADLs, R ankle DF with knee ext to 15deg Progress  3. ? Strength: Increase R ankle strength globally to 4/5 once allowed to perform strengthening   4. ? Function: Pt to ambulate in tennis shoe without device once allowed per protocol  MET  5. Independent with Home Exercise Programs MET        LTG: (to be met in 20 treatments)  1. Improve score on assessment tool from 78% impaired to 35% impaired demonstrating an improvement in ADLs  2. Reduce pain levels to 0/10                    Patient goals: To ride motorcycle this summer     Pt. Education:  [x] Yes  [] No  [] Reviewed Prior HEP/Ed  Method of Education: [x] Verbal  [x] Demo  [] Written  Comprehension of Education:  [x] Verbalizes understanding. [x] Demonstrates understanding. [x] Needs review. [] Demonstrates/verbalizes HEP/Ed previously given. Plan: [x] Continue current frequency toward long and short term goals.           Time In: 1100          Time Out: 1155    Electronically signed by:  Wendy Bernstein, PT,

## 2022-07-11 ENCOUNTER — TELEPHONE (OUTPATIENT)
Dept: ORTHOPEDIC SURGERY | Age: 47
End: 2022-07-11

## 2022-07-11 NOTE — TELEPHONE ENCOUNTER
Spoke with patient. Per Dr. Jennifer Edwards note, it stated patient may return to work when she is comfortable/ready. Patient states at this time she is not. Having issues with standing for longer than 30 minutes at a time, has swelling, and also having issues with the gabapentin. I extended her to 7/28 (her follow up is 7/21) and I asked her to bring any paperwork for her job to her appointment if anything needs to be filled out. She stated understanding and had no further questions at this time.

## 2022-07-11 NOTE — TELEPHONE ENCOUNTER
patient called and stated she thinks she is to return back to work tomorrow 07-12 and she is still in pain and having swelling and unable to stand more than 30 minutes- she did set up an appt to come back to see Dr Kristin Putnam and would like to extend her leave of absence from work until she can see him- please advise and reach out to patient at [phone], thank you

## 2022-07-19 ENCOUNTER — OFFICE VISIT (OUTPATIENT)
Dept: ORTHOPEDIC SURGERY | Age: 47
End: 2022-07-19
Payer: COMMERCIAL

## 2022-07-19 VITALS — WEIGHT: 258 LBS | HEIGHT: 65 IN | BODY MASS INDEX: 42.99 KG/M2 | RESPIRATION RATE: 16 BRPM | OXYGEN SATURATION: 100 %

## 2022-07-19 DIAGNOSIS — S92.301A MULTIPLE CLOSED FRACTURES OF METATARSAL BONE OF RIGHT FOOT, INITIAL ENCOUNTER: Primary | ICD-10-CM

## 2022-07-19 DIAGNOSIS — S82.899A FRACTURE OF ANKLE, CLOSED, UNSPECIFIED LATERALITY, INITIAL ENCOUNTER: ICD-10-CM

## 2022-07-19 DIAGNOSIS — S86.819A TEAR OF PERONEAL RETINACULUM: Primary | ICD-10-CM

## 2022-07-19 DIAGNOSIS — S86.819A TEAR OF PERONEAL RETINACULUM: ICD-10-CM

## 2022-07-19 DIAGNOSIS — M79.671 RIGHT FOOT PAIN: Primary | ICD-10-CM

## 2022-07-19 DIAGNOSIS — M25.572 LEFT ANKLE PAIN, UNSPECIFIED CHRONICITY: Primary | ICD-10-CM

## 2022-07-19 DIAGNOSIS — M24.472 CHRONIC OR RECURRENT SUBLUXATION OF PERONEAL TENDON OF LEFT FOOT: ICD-10-CM

## 2022-07-19 PROCEDURE — 99214 OFFICE O/P EST MOD 30 MIN: CPT | Performed by: ORTHOPAEDIC SURGERY

## 2022-07-19 RX ORDER — LIDOCAINE 4 G/G
PATCH TOPICAL
Qty: 14 PATCH | Refills: 0 | Status: SHIPPED | OUTPATIENT
Start: 2022-07-19

## 2022-07-19 RX ORDER — ASPIRIN 325 MG
325 TABLET, DELAYED RELEASE (ENTERIC COATED) ORAL DAILY
Qty: 42 TABLET | Refills: 0 | Status: SHIPPED | OUTPATIENT
Start: 2022-07-19 | End: 2022-08-30
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
## 2022-07-21 ENCOUNTER — TELEPHONE (OUTPATIENT)
Dept: ORTHOPEDIC SURGERY | Age: 47
End: 2022-07-21

## 2022-07-21 ENCOUNTER — HOSPITAL ENCOUNTER (OUTPATIENT)
Dept: MRI IMAGING | Age: 47
Discharge: HOME | End: 2022-07-23
Payer: COMMERCIAL

## 2022-07-21 DIAGNOSIS — S86.819A TEAR OF PERONEAL RETINACULUM: ICD-10-CM

## 2022-07-21 PROCEDURE — 73721 MRI JNT OF LWR EXTRE W/O DYE: CPT

## 2022-07-26 ENCOUNTER — OFFICE VISIT (OUTPATIENT)
Dept: ORTHOPEDIC SURGERY | Age: 47
End: 2022-07-26
Payer: COMMERCIAL

## 2022-07-26 VITALS — OXYGEN SATURATION: 100 % | WEIGHT: 250 LBS | RESPIRATION RATE: 16 BRPM | HEIGHT: 65 IN | BODY MASS INDEX: 41.65 KG/M2

## 2022-07-26 DIAGNOSIS — S93.402D SPRAIN OF LEFT ANKLE, UNSPECIFIED LIGAMENT, SUBSEQUENT ENCOUNTER: Primary | ICD-10-CM

## 2022-07-26 DIAGNOSIS — S92.301D MULTIPLE CLOSED FRACTURES OF METATARSAL BONE OF RIGHT FOOT WITH ROUTINE HEALING, SUBSEQUENT ENCOUNTER: ICD-10-CM

## 2022-07-26 PROCEDURE — 99214 OFFICE O/P EST MOD 30 MIN: CPT | Performed by: ORTHOPAEDIC SURGERY

## 2022-07-26 NOTE — LETTER
[address] [place] [address] and Sports [address] [address]  Phone: [phone]  Fax: [phone]    Jm Brennan MD        July 26, 2022     Patient: Joseph Rolon   YOB: 1975   Date of Visit: 7/26/2022       To Whom It May Concern: It is my medical opinion that Sondra Sanchez remain off work for 6 weeks. She may return on 9/12/2022. If you have any questions or concerns, please don't hesitate to call.     Sincerely,    The office of Dr. Nikolay Quezada MD
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
## 2022-08-01 ENCOUNTER — HOSPITAL ENCOUNTER (OUTPATIENT)
Dept: PHYSICAL THERAPY | Facility: CLINIC | Age: 47
Setting detail: THERAPIES SERIES
Discharge: HOME | End: 2022-08-01
Payer: COMMERCIAL

## 2022-08-01 PROCEDURE — 97110 THERAPEUTIC EXERCISES: CPT

## 2022-08-01 PROCEDURE — 97161 PT EVAL LOW COMPLEX 20 MIN: CPT
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
## 2022-08-04 ENCOUNTER — HOSPITAL ENCOUNTER (OUTPATIENT)
Dept: PHYSICAL THERAPY | Facility: CLINIC | Age: 47
Setting detail: THERAPIES SERIES
Discharge: HOME | End: 2022-08-04
Payer: COMMERCIAL

## 2022-08-04 PROCEDURE — 97110 THERAPEUTIC EXERCISES: CPT

## 2022-08-04 NOTE — FLOWSHEET NOTE
[address]  [address]  [phone]  F: [phone] [] [address]  [address]   [address]  P: [phone]  F: [phone] [] [address]  [address]  P: [phone]  F: [phone] [x] [address]  P: [phone]  F: [phone] [] [address]  [facility]   [address]   [place]: [phone]  F: [phone]      Physical Therapy Daily Treatment Note    Date:  2022  Patient Name:  Rossy Gamboa    :  1975  MRN: [id number]  Physician: Dr. Elie Mcguire: Herve Henning (only 2 visits remaining, auth for additional visits?)  Medical Diagnosis: Sprain of L ankle                       Rehab Codes: S93.402D  Onset date: 7/15/22               Next Dr's appt. : 6 weeks out  Visit# / total visits:   Cancels/No Shows: 0/0    Subjective:    Pain:  [x] Yes  [] No Location: L ankle Pain Rating: (0-10 scale) 4/10  Pain altered Tx:  [] No  [] Yes  Action:  Comments: Pt reported that her pain is currently at 4/10, increases to 8/10 when standing.      Objective:  Modalities:   Precautions:  Exercises:  Exercise  L ankle sprain    Reps/ Time Weight/ Level Comments   NUSTEP                   4 way ankle tband 10x ea orange bilateral   Ankle circles 10x   bilat   BAPS seated  10x ea    L only   Long sitting gastroc stretch   3x30\"        Seated Heel raises 2x10   bilat                                                                                   Other:     Specific Instructions for next treatment: Continue with ankle ROM and strengthening as tolerated      Treatment Charges: Mins Units   []  Modalities     []  Ther Exercise 38 3   []  Manual Therapy     []  Ther Activities     []  Aquatics     []  Vasocompression     []  Other     Total Treatment time 38 3       Assessment: [x] Progressing toward goals. Pt with good tolerance to activities noting some increased pain up 6/10 but was able to complete all activities. Added heel raises, gastroc stretch and Baps board to increase ROM and strength. Pt declined vasocompression. [] No change. [] Other:  [x] Patient would continue to benefit from skilled physical therapy services in order to: Strengthen bilateral ankles, improve gait and wean out of brace and surgical shoe. STG/LTG    STG: (to be met in 10 treatments)  ? Pain: Decrease pain levels to 3/10 in R shin and L foot   ? ROM: Increase flexibility and AROM limitations throughout to equal bilat to reduce difficulty with ADLs, increase L ankle AROM to symetrical to R ankle ROM  ? Strength: Increase  L ankle strength from 4/5 to 5/5 fori nversion and eversion  ? Function: Pt to ambulate outside of brace and surgical shoe as tolerated, with normalized gait pattern  Independent with Home Exercise Programs        LTG: (to be met in 20 treatments)  Improve score on assessment tool from 70% impaired to 35% impaired, demonstrating an improvement in ADLs  Reduce pain levels to 0/10                    Patient goals: To regain srength and mobilty in bilateral ankles      Pt. Education:  [x] Yes  [] No  [x] Reviewed Prior HEP/Ed  Method of Education: [x] Verbal  [x] Demo  [x] Written  Comprehension of Education:  [x] Verbalizes understanding. [x] Demonstrates understanding. [] Needs review. [x] Demonstrates/verbalizes HEP/Ed previously given. Plan: [x] Continue current frequency toward long and short term goals.     [x] Specific Instructions for subsequent treatments: above      Time In:1402            Time Out: 7419    Electronically signed by:  Evita Balderas PTA

## 2022-09-02 DIAGNOSIS — S92.301D MULTIPLE CLOSED FRACTURES OF METATARSAL BONE OF RIGHT FOOT WITH ROUTINE HEALING, SUBSEQUENT ENCOUNTER: Primary | ICD-10-CM

## 2022-09-06 ENCOUNTER — OFFICE VISIT (OUTPATIENT)
Dept: ORTHOPEDIC SURGERY | Age: 47
End: 2022-09-06
Payer: COMMERCIAL

## 2022-09-06 VITALS — RESPIRATION RATE: 16 BRPM | WEIGHT: 250 LBS | HEIGHT: 65 IN | OXYGEN SATURATION: 100 % | BODY MASS INDEX: 41.65 KG/M2

## 2022-09-06 DIAGNOSIS — S93.402D SPRAIN OF LEFT ANKLE, UNSPECIFIED LIGAMENT, SUBSEQUENT ENCOUNTER: ICD-10-CM

## 2022-09-06 DIAGNOSIS — S92.301D MULTIPLE CLOSED FRACTURES OF METATARSAL BONE OF RIGHT FOOT WITH ROUTINE HEALING, SUBSEQUENT ENCOUNTER: Primary | ICD-10-CM

## 2022-09-06 PROCEDURE — 99212 OFFICE O/P EST SF 10 MIN: CPT | Performed by: ORTHOPAEDIC SURGERY

## 2022-09-06 NOTE — LETTER
[address] [place] [address] and Sports Medicine  [address] [address]  Phone: [phone]  Fax: [phone]    Jm Brennan MD        September 6, 2022     Patient: Joseph Rolon   YOB: 1975   Date of Visit: 9/6/2022       To Whom It May Concern: It is my medical opinion that Sondra Sanchez can return to work on 9/13/22 full duty with no restrictions. If you have any questions or concerns, please don't hesitate to call.     Sincerely,    The office of Dr. Nikolay Quezada MD
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
## 2023-03-16 DIAGNOSIS — S92.301D MULTIPLE CLOSED FRACTURES OF METATARSAL BONE OF RIGHT FOOT WITH ROUTINE HEALING, SUBSEQUENT ENCOUNTER: Primary | ICD-10-CM

## 2023-03-20 ENCOUNTER — OFFICE VISIT (OUTPATIENT)
Dept: ORTHOPEDIC SURGERY | Age: 48
End: 2023-03-20
Payer: COMMERCIAL

## 2023-03-20 VITALS — RESPIRATION RATE: 16 BRPM | BODY MASS INDEX: 41.65 KG/M2 | OXYGEN SATURATION: 100 % | WEIGHT: 250 LBS | HEIGHT: 65 IN

## 2023-03-20 DIAGNOSIS — S92.301D MULTIPLE CLOSED FRACTURES OF METATARSAL BONE OF RIGHT FOOT WITH ROUTINE HEALING, SUBSEQUENT ENCOUNTER: Primary | ICD-10-CM

## 2023-03-20 DIAGNOSIS — M25.371 INSTABILITY OF RIGHT ANKLE JOINT: ICD-10-CM

## 2023-03-20 DIAGNOSIS — S93.402D SPRAIN OF LEFT ANKLE, UNSPECIFIED LIGAMENT, SUBSEQUENT ENCOUNTER: ICD-10-CM

## 2023-03-20 DIAGNOSIS — M76.71 PERONEAL TENDINITIS OF RIGHT LOWER EXTREMITY: ICD-10-CM

## 2023-03-20 PROCEDURE — 99213 OFFICE O/P EST LOW 20 MIN: CPT | Performed by: ORTHOPAEDIC SURGERY

## 2023-03-20 NOTE — PROGRESS NOTES
[]  Strap      []  Toe sleeves/splints    []  PT:                     []  Wean out of immobilization   []  Advance activity       []  Topical               []  NSAIDs          []  Mary         []  Referral:         []  Stress xrays       []  CT         []  MRI        []  Injection:         []  Consider OR      []  Pick OR date    No follow-ups on file. No orders of the defined types were placed in this encounter. No orders of the defined types were placed in this encounter. Armen Costa MD  Orthopedic Surgery        Please excuse any typos/errors, as this note was created with the assistance of voice recognition software. While intending to generate a document that actually reflects the content of the visit, the document can still have some errors including those of syntax and sound-a-like substitutions which may escape proof reading. In such instances, actual meaning can be extrapolated by context.

## 2023-04-25 ENCOUNTER — HOSPITAL ENCOUNTER (OUTPATIENT)
Dept: MRI IMAGING | Age: 48
Discharge: HOME | End: 2023-04-27
Payer: COMMERCIAL

## 2023-04-25 DIAGNOSIS — S93.402D SPRAIN OF LEFT ANKLE, UNSPECIFIED LIGAMENT, SUBSEQUENT ENCOUNTER: ICD-10-CM

## 2023-04-25 DIAGNOSIS — S92.301D MULTIPLE CLOSED FRACTURES OF METATARSAL BONE OF RIGHT FOOT WITH ROUTINE HEALING, SUBSEQUENT ENCOUNTER: ICD-10-CM

## 2023-04-25 DIAGNOSIS — M76.71 PERONEAL TENDINITIS OF RIGHT LOWER EXTREMITY: ICD-10-CM

## 2023-04-25 DIAGNOSIS — M25.371 INSTABILITY OF RIGHT ANKLE JOINT: ICD-10-CM

## 2023-04-25 PROCEDURE — 73721 MRI JNT OF LWR EXTRE W/O DYE: CPT

## 2023-05-01 ENCOUNTER — OFFICE VISIT (OUTPATIENT)
Dept: ORTHOPEDIC SURGERY | Age: 48
End: 2023-05-01

## 2023-05-01 VITALS — WEIGHT: 250 LBS | HEIGHT: 65 IN | BODY MASS INDEX: 41.65 KG/M2 | OXYGEN SATURATION: 100 % | RESPIRATION RATE: 16 BRPM

## 2023-05-01 DIAGNOSIS — M95.8 OSTEOCHONDRAL DEFECT OF ANKLE: Primary | ICD-10-CM

## 2023-05-01 DIAGNOSIS — M25.371 INSTABILITY OF RIGHT ANKLE JOINT: ICD-10-CM

## 2023-05-01 DIAGNOSIS — M19.071 OSTEOARTHRITIS OF RIGHT ANKLE OR FOOT: Primary | ICD-10-CM

## 2023-05-01 DIAGNOSIS — M19.071 OSTEOARTHRITIS OF RIGHT ANKLE OR FOOT: ICD-10-CM

## 2023-05-01 RX ORDER — LIDOCAINE HYDROCHLORIDE 10 MG/ML
2 INJECTION, SOLUTION INFILTRATION; PERINEURAL ONCE
Status: COMPLETED | OUTPATIENT
Start: 2023-05-01 | End: 2023-05-05

## 2023-05-01 RX ORDER — NAPROXEN 500 MG/1
500 TABLET ORAL 2 TIMES DAILY PRN
Qty: 60 TABLET | Refills: 0 | Status: SHIPPED | OUTPATIENT
Start: 2023-05-01

## 2023-05-01 RX ORDER — TRIAMCINOLONE ACETONIDE 40 MG/ML
40 INJECTION, SUSPENSION INTRA-ARTICULAR; INTRAMUSCULAR ONCE
Status: COMPLETED | OUTPATIENT
Start: 2023-05-01 | End: 2023-05-05
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
## 2023-05-05 RX ADMIN — LIDOCAINE HYDROCHLORIDE 2 ML: 10 INJECTION, SOLUTION INFILTRATION; PERINEURAL at 14:24

## 2023-05-05 RX ADMIN — TRIAMCINOLONE ACETONIDE 40 MG: 40 INJECTION, SUSPENSION INTRA-ARTICULAR; INTRAMUSCULAR at 14:25

## 2023-06-26 ENCOUNTER — OFFICE VISIT (OUTPATIENT)
Dept: ORTHOPEDIC SURGERY | Age: 48
End: 2023-06-26

## 2023-06-26 VITALS — OXYGEN SATURATION: 100 % | HEIGHT: 65 IN | RESPIRATION RATE: 16 BRPM | BODY MASS INDEX: 41.65 KG/M2 | WEIGHT: 250 LBS

## 2023-06-26 DIAGNOSIS — M95.8 OSTEOCHONDRAL DEFECT OF ANKLE: ICD-10-CM

## 2023-06-26 DIAGNOSIS — M19.071 OSTEOARTHRITIS OF RIGHT ANKLE OR FOOT: ICD-10-CM

## 2023-06-26 DIAGNOSIS — M25.371 INSTABILITY OF RIGHT ANKLE JOINT: Primary | ICD-10-CM

## 2023-07-13 ENCOUNTER — HOSPITAL ENCOUNTER (OUTPATIENT)
Dept: CT IMAGING | Age: 48
Discharge: HOME | End: 2023-07-15
Attending: ORTHOPAEDIC SURGERY
Payer: COMMERCIAL

## 2023-07-13 DIAGNOSIS — M25.371 INSTABILITY OF RIGHT ANKLE JOINT: ICD-10-CM

## 2023-07-13 DIAGNOSIS — M19.071 OSTEOARTHRITIS OF RIGHT ANKLE OR FOOT: ICD-10-CM

## 2023-07-13 DIAGNOSIS — M95.8 OSTEOCHONDRAL DEFECT OF ANKLE: ICD-10-CM

## 2023-07-13 PROCEDURE — 73700 CT LOWER EXTREMITY W/O DYE: CPT

## 2023-07-20 ENCOUNTER — OFFICE VISIT (OUTPATIENT)
Dept: ORTHOPEDIC SURGERY | Age: 48
End: 2023-07-20
Payer: COMMERCIAL

## 2023-07-20 VITALS — RESPIRATION RATE: 16 BRPM | HEIGHT: 65 IN | BODY MASS INDEX: 41.65 KG/M2 | WEIGHT: 250 LBS | OXYGEN SATURATION: 100 %

## 2023-07-20 DIAGNOSIS — M19.071 OSTEOARTHRITIS OF RIGHT ANKLE OR FOOT: ICD-10-CM

## 2023-07-20 DIAGNOSIS — M95.8 OSTEOCHONDRAL DEFECT OF ANKLE: ICD-10-CM

## 2023-07-20 DIAGNOSIS — M85.60 BONE CYST: ICD-10-CM

## 2023-07-20 DIAGNOSIS — M25.371 INSTABILITY OF RIGHT ANKLE JOINT: Primary | ICD-10-CM

## 2023-07-20 PROCEDURE — 99214 OFFICE O/P EST MOD 30 MIN: CPT | Performed by: ORTHOPAEDIC SURGERY

## 2023-07-28 ENCOUNTER — HOSPITAL ENCOUNTER (OUTPATIENT)
Dept: PREADMISSION TESTING | Age: 48
End: 2023-07-28
Payer: COMMERCIAL

## 2023-07-28 VITALS
HEIGHT: 65 IN | TEMPERATURE: 97.1 F | BODY MASS INDEX: 43 KG/M2 | HEART RATE: 64 BPM | RESPIRATION RATE: 16 BRPM | SYSTOLIC BLOOD PRESSURE: 135 MMHG | DIASTOLIC BLOOD PRESSURE: 86 MMHG | WEIGHT: 258.1 LBS | OXYGEN SATURATION: 97 %

## 2023-07-28 LAB
ANION GAP SERPL CALCULATED.3IONS-SCNC: 10 MMOL/L (ref 9–17)
BUN SERPL-MCNC: 8 MG/DL (ref 6–20)
BUN/CREAT SERPL: 13 (ref 9–20)
CALCIUM SERPL-MCNC: 9 MG/DL (ref 8.6–10.4)
CHLORIDE SERPL-SCNC: 107 MMOL/L (ref 98–107)
CO2 SERPL-SCNC: 23 MMOL/L (ref 20–31)
CREAT SERPL-MCNC: 0.6 MG/DL (ref 0.5–0.9)
EKG ATRIAL RATE: 55 BPM
EKG P AXIS: 23 DEGREES
EKG P-R INTERVAL: 136 MS
EKG Q-T INTERVAL: 440 MS
EKG QRS DURATION: 90 MS
EKG QTC CALCULATION (BAZETT): 420 MS
EKG R AXIS: 8 DEGREES
EKG T AXIS: 12 DEGREES
EKG VENTRICULAR RATE: 55 BPM
ERYTHROCYTE [DISTWIDTH] IN BLOOD BY AUTOMATED COUNT: 13.1 % (ref 11.8–14.4)
GFR SERPL CREATININE-BSD FRML MDRD: >60 ML/MIN/1.73M2
GLUCOSE SERPL-MCNC: 105 MG/DL (ref 70–99)
HCT VFR BLD AUTO: 44.5 % (ref 36.3–47.1)
HGB BLD-MCNC: 14.6 G/DL (ref 11.9–15.1)
MCH RBC QN AUTO: 29.7 PG (ref 25.2–33.5)
MCHC RBC AUTO-ENTMCNC: 32.8 G/DL (ref 28.4–34.8)
MCV RBC AUTO: 90.4 FL (ref 82.6–102.9)
NRBC BLD-RTO: 0 PER 100 WBC
PLATELET # BLD AUTO: 230 K/UL (ref 138–453)
PMV BLD AUTO: 8.7 FL (ref 8.1–13.5)
POTASSIUM SERPL-SCNC: 4.5 MMOL/L (ref 3.7–5.3)
RBC # BLD AUTO: 4.92 M/UL (ref 3.95–5.11)
SODIUM SERPL-SCNC: 140 MMOL/L (ref 135–144)
WBC OTHER # BLD: 7.5 K/UL (ref 3.5–11.3)

## 2023-07-28 PROCEDURE — 85027 COMPLETE CBC AUTOMATED: CPT

## 2023-07-28 PROCEDURE — 36415 COLL VENOUS BLD VENIPUNCTURE: CPT

## 2023-07-28 PROCEDURE — 80048 BASIC METABOLIC PNL TOTAL CA: CPT

## 2023-07-28 PROCEDURE — 93005 ELECTROCARDIOGRAM TRACING: CPT | Performed by: ANESTHESIOLOGY

## 2023-07-28 RX ORDER — BUSPIRONE HYDROCHLORIDE 15 MG/1
15 TABLET ORAL DAILY
COMMUNITY

## 2023-07-28 RX ORDER — DESVENLAFAXINE SUCCINATE 50 MG/1
50 TABLET, EXTENDED RELEASE ORAL DAILY
COMMUNITY

## 2023-07-28 ASSESSMENT — PAIN DESCRIPTION - ORIENTATION: ORIENTATION: RIGHT

## 2023-07-28 ASSESSMENT — PAIN DESCRIPTION - LOCATION: LOCATION: ANKLE

## 2023-07-28 ASSESSMENT — PAIN DESCRIPTION - DESCRIPTORS: DESCRIPTORS: SHARP

## 2023-07-28 ASSESSMENT — PAIN SCALES - GENERAL: PAINLEVEL_OUTOF10: 5

## 2023-07-28 NOTE — PRE-PROCEDURE INSTRUCTIONS
On the Day of Your Surgery, Wednesday, 8/9/23, Please Arrive At 10:10 AM     Enter the hospital through the Main Entrance, take the lobby elevators to the second floor and check in at the Surgery Registration desk. Continue to take your home medications as you normally do up to and including the night before surgery with the exception of blood thinning medications. Blood Thinning Medications:  Please stop prescription blood thinning medications such as Apixaban (Eliquis); Clopidogrel (Plavix); Dabigatran (Pradaxa); Prasugrel (Effient); Rivaroxaban (Xarelto); Ticagrelor (Brilinta); Warfarin (Coumadin) only as directed by your surgeon and/or the prescribing physician    Some common examples of other medications that can thin your blood are: Aspirin, Ibuprofen (Advil, Motrin), Naproxen (Aleve), Meloxicam (Mobic), Celecoxib (Celebrex), Fish Oil, many Herbal Supplements. These medications should usually be stopped at least 7 days prior to surgery. Stop Naproxen 7 days prior to surgery. Tylenol is OK to take for pain the week prior to surgery. Failure to stop certain medications may interfere with your scheduled surgery. If you receive instructions from your surgeon regarding what medications to stop prior to surgery, please follow those specific instructions. If You Have Diabetes:  Do not take any of your diabetic medications, (injectables or by mouth) the morning of surgery unless otherwise instructed by the doctor who manages your diabetes. If you are taking insulin, contact the doctor the manages your diabetes for instructions about any changes to your insulin dosages the day before surgery. Please take the following medication(s) the day of surgery with small sips of water:              none    Please use your inhaler(s) if needed and bring your inhaler(s) from home the day of surgery. Showering Before Surgery:      You can play an important role in your own health by carefully washing includes no gum, hard candy, mints or water. The only exception to this is small sips of water to take the medications listed above. No smoking or chewing tobacco after midnight. No alcoholic beverages for 24 hours prior to surgery. 2. You may brush your teeth but do not swallow the water. 3. If you wear glasses bring a case for them if you have one. No contacts should be worn the day of surgery. You may also bring your hearing aids. If you have dentures, most surgical procedures involving anesthesia will require that you remove them prior to surgery. 4. If you sleep with a CPAP or BiPAP machine at home and plan on staying in the hospital overnight after surgery, please bring your machine with you. 5. Do not wear any jewelry or body piercings the day of surgery. No nail polish on the operative extremity (arm/hand or leg/foot surgeries)   6. If you are staying overnight with us, you may bring a small bag of necessary personal items. 7. Please wear loose, comfortable clothing. If you are potentially going to have a cast, sling, brace or bulky dressing, make sure to wear clothing that will fit over it. 8. In case of illness - If you have cold or flu like symptoms (high fever, runny nose, sore throat, cough, etc.) rash, nausea, vomiting, loose stools, and/or recent contact with someone who has a contagious disease (chicken pox, measles, COVID-19, etc.). Please call your surgeon before coming to the hospital.    Transportation After Your Surgery/Procedure: If you are going home the same day of surgery you need someone to drive you home. Your  must be at least 25years of age. A taxi cab or other nonmedical public transportation is not acceptable unless you have someone to ride home in the vehicle with you. For your safety, someone must remain with you for the first 24 hours after your surgery if you receive anesthesia or medication.   If you do not have someone to stay with you, your

## 2023-07-31 LAB
EKG ATRIAL RATE: 55 BPM
EKG P AXIS: 23 DEGREES
EKG P-R INTERVAL: 136 MS
EKG Q-T INTERVAL: 440 MS
EKG QRS DURATION: 90 MS
EKG QTC CALCULATION (BAZETT): 420 MS
EKG R AXIS: 8 DEGREES
EKG T AXIS: 12 DEGREES
EKG VENTRICULAR RATE: 55 BPM

## 2023-07-31 RX ORDER — ACETAMINOPHEN 500 MG
1000 TABLET ORAL ONCE
OUTPATIENT
Start: 2023-08-09

## 2023-08-03 ENCOUNTER — OFFICE VISIT (OUTPATIENT)
Dept: ORTHOPEDIC SURGERY | Age: 48
End: 2023-08-03
Payer: COMMERCIAL

## 2023-08-03 VITALS — HEIGHT: 65 IN | WEIGHT: 258 LBS | RESPIRATION RATE: 16 BRPM | OXYGEN SATURATION: 100 % | BODY MASS INDEX: 42.99 KG/M2

## 2023-08-03 DIAGNOSIS — M85.60 BONE CYST: ICD-10-CM

## 2023-08-03 DIAGNOSIS — M95.8 OSTEOCHONDRAL DEFECT OF ANKLE: ICD-10-CM

## 2023-08-03 DIAGNOSIS — M25.371 INSTABILITY OF RIGHT ANKLE JOINT: Primary | ICD-10-CM

## 2023-08-03 PROCEDURE — 99213 OFFICE O/P EST LOW 20 MIN: CPT | Performed by: ORTHOPAEDIC SURGERY

## 2023-08-09 ENCOUNTER — APPOINTMENT (OUTPATIENT)
Dept: GENERAL RADIOLOGY | Age: 48
End: 2023-08-09
Attending: ORTHOPAEDIC SURGERY
Payer: COMMERCIAL

## 2023-08-09 ENCOUNTER — ANESTHESIA EVENT (OUTPATIENT)
Dept: OPERATING ROOM | Age: 48
End: 2023-08-09
Payer: COMMERCIAL

## 2023-08-09 ENCOUNTER — ANESTHESIA (OUTPATIENT)
Dept: OPERATING ROOM | Age: 48
End: 2023-08-09
Payer: COMMERCIAL

## 2023-08-09 ENCOUNTER — HOSPITAL ENCOUNTER (OUTPATIENT)
Age: 48
Setting detail: OUTPATIENT SURGERY
Discharge: HOME | End: 2023-08-09
Attending: ORTHOPAEDIC SURGERY | Admitting: ORTHOPAEDIC SURGERY
Payer: COMMERCIAL

## 2023-08-09 VITALS
RESPIRATION RATE: 12 BRPM | WEIGHT: 258.1 LBS | SYSTOLIC BLOOD PRESSURE: 123 MMHG | BODY MASS INDEX: 43 KG/M2 | HEIGHT: 65 IN | DIASTOLIC BLOOD PRESSURE: 78 MMHG | TEMPERATURE: 96.8 F | OXYGEN SATURATION: 95 % | HEART RATE: 55 BPM

## 2023-08-09 DIAGNOSIS — M25.371 INSTABILITY OF RIGHT ANKLE JOINT: Primary | ICD-10-CM

## 2023-08-09 PROCEDURE — 6360000002 HC RX W HCPCS: Performed by: ANESTHESIOLOGY

## 2023-08-09 PROCEDURE — 6360000002 HC RX W HCPCS: Performed by: NURSE ANESTHETIST, CERTIFIED REGISTERED

## 2023-08-09 PROCEDURE — 3600000012 HC SURGERY LEVEL 2 ADDTL 15MIN: Performed by: ORTHOPAEDIC SURGERY

## 2023-08-09 PROCEDURE — 3700000000 HC ANESTHESIA ATTENDED CARE: Performed by: ORTHOPAEDIC SURGERY

## 2023-08-09 PROCEDURE — 2500000003 HC RX 250 WO HCPCS: Performed by: NURSE ANESTHETIST, CERTIFIED REGISTERED

## 2023-08-09 PROCEDURE — 7100000001 HC PACU RECOVERY - ADDTL 15 MIN: Performed by: ORTHOPAEDIC SURGERY

## 2023-08-09 PROCEDURE — 2709999900 HC NON-CHARGEABLE SUPPLY: Performed by: ORTHOPAEDIC SURGERY

## 2023-08-09 PROCEDURE — 3700000001 HC ADD 15 MINUTES (ANESTHESIA): Performed by: ORTHOPAEDIC SURGERY

## 2023-08-09 PROCEDURE — 7100000000 HC PACU RECOVERY - FIRST 15 MIN: Performed by: ORTHOPAEDIC SURGERY

## 2023-08-09 PROCEDURE — 3600000002 HC SURGERY LEVEL 2 BASE: Performed by: ORTHOPAEDIC SURGERY

## 2023-08-09 PROCEDURE — 6360000002 HC RX W HCPCS: Performed by: ORTHOPAEDIC SURGERY

## 2023-08-09 PROCEDURE — 7100000010 HC PHASE II RECOVERY - FIRST 15 MIN: Performed by: ORTHOPAEDIC SURGERY

## 2023-08-09 PROCEDURE — 2580000003 HC RX 258: Performed by: NURSE ANESTHETIST, CERTIFIED REGISTERED

## 2023-08-09 PROCEDURE — 6370000000 HC RX 637 (ALT 250 FOR IP): Performed by: ANESTHESIOLOGY

## 2023-08-09 PROCEDURE — 2580000003 HC RX 258: Performed by: ANESTHESIOLOGY

## 2023-08-09 PROCEDURE — 7100000011 HC PHASE II RECOVERY - ADDTL 15 MIN: Performed by: ORTHOPAEDIC SURGERY

## 2023-08-09 RX ORDER — SULFAMETHOXAZOLE AND TRIMETHOPRIM 800; 160 MG/1; MG/1
1 TABLET ORAL 2 TIMES DAILY
Qty: 10 TABLET | Refills: 0 | Status: SHIPPED | OUTPATIENT
Start: 2023-08-09 | End: 2023-08-14

## 2023-08-09 RX ORDER — PROPOFOL 10 MG/ML
INJECTION, EMULSION INTRAVENOUS PRN
Status: DISCONTINUED | OUTPATIENT
Start: 2023-08-09 | End: 2023-08-09 | Stop reason: SDUPTHER

## 2023-08-09 RX ORDER — DEXAMETHASONE SODIUM PHOSPHATE 10 MG/ML
INJECTION, SOLUTION INTRAMUSCULAR; INTRAVENOUS PRN
Status: DISCONTINUED | OUTPATIENT
Start: 2023-08-09 | End: 2023-08-09 | Stop reason: SDUPTHER

## 2023-08-09 RX ORDER — ONDANSETRON 4 MG/1
4 TABLET, FILM COATED ORAL EVERY 8 HOURS PRN
Qty: 20 TABLET | Refills: 0 | Status: SHIPPED | OUTPATIENT
Start: 2023-08-09 | End: 2023-08-16

## 2023-08-09 RX ORDER — DIPHENHYDRAMINE HYDROCHLORIDE 50 MG/ML
12.5 INJECTION INTRAMUSCULAR; INTRAVENOUS
Status: DISCONTINUED | OUTPATIENT
Start: 2023-08-09 | End: 2023-08-09 | Stop reason: HOSPADM

## 2023-08-09 RX ORDER — FENTANYL CITRATE 50 UG/ML
INJECTION, SOLUTION INTRAMUSCULAR; INTRAVENOUS PRN
Status: DISCONTINUED | OUTPATIENT
Start: 2023-08-09 | End: 2023-08-09 | Stop reason: SDUPTHER

## 2023-08-09 RX ORDER — MIDAZOLAM HYDROCHLORIDE 1 MG/ML
2 INJECTION INTRAMUSCULAR; INTRAVENOUS ONCE
Status: COMPLETED | OUTPATIENT
Start: 2023-08-09 | End: 2023-08-09

## 2023-08-09 RX ORDER — FENTANYL CITRATE 50 UG/ML
25 INJECTION, SOLUTION INTRAMUSCULAR; INTRAVENOUS EVERY 5 MIN PRN
Status: DISCONTINUED | OUTPATIENT
Start: 2023-08-09 | End: 2023-08-09 | Stop reason: HOSPADM

## 2023-08-09 RX ORDER — ASPIRIN 325 MG
325 TABLET, DELAYED RELEASE (ENTERIC COATED) ORAL DAILY
Qty: 42 TABLET | Refills: 0 | Status: SHIPPED | OUTPATIENT
Start: 2023-08-09

## 2023-08-09 RX ORDER — FENTANYL CITRATE 50 UG/ML
50 INJECTION, SOLUTION INTRAMUSCULAR; INTRAVENOUS EVERY 5 MIN PRN
Status: DISCONTINUED | OUTPATIENT
Start: 2023-08-09 | End: 2023-08-09 | Stop reason: HOSPADM

## 2023-08-09 RX ORDER — LIDOCAINE HYDROCHLORIDE 20 MG/ML
INJECTION, SOLUTION EPIDURAL; INFILTRATION; INTRACAUDAL; PERINEURAL PRN
Status: DISCONTINUED | OUTPATIENT
Start: 2023-08-09 | End: 2023-08-09 | Stop reason: SDUPTHER

## 2023-08-09 RX ORDER — SODIUM CHLORIDE, SODIUM LACTATE, POTASSIUM CHLORIDE, CALCIUM CHLORIDE 600; 310; 30; 20 MG/100ML; MG/100ML; MG/100ML; MG/100ML
INJECTION, SOLUTION INTRAVENOUS ONCE
Status: COMPLETED | OUTPATIENT
Start: 2023-08-09 | End: 2023-08-09

## 2023-08-09 RX ORDER — ACETAMINOPHEN 500 MG
1000 TABLET ORAL ONCE
Status: COMPLETED | OUTPATIENT
Start: 2023-08-09 | End: 2023-08-09

## 2023-08-09 RX ORDER — ONDANSETRON 2 MG/ML
4 INJECTION INTRAMUSCULAR; INTRAVENOUS
Status: DISCONTINUED | OUTPATIENT
Start: 2023-08-09 | End: 2023-08-09 | Stop reason: HOSPADM

## 2023-08-09 RX ORDER — SODIUM CHLORIDE, SODIUM LACTATE, POTASSIUM CHLORIDE, CALCIUM CHLORIDE 600; 310; 30; 20 MG/100ML; MG/100ML; MG/100ML; MG/100ML
INJECTION, SOLUTION INTRAVENOUS CONTINUOUS PRN
Status: DISCONTINUED | OUTPATIENT
Start: 2023-08-09 | End: 2023-08-09 | Stop reason: SDUPTHER

## 2023-08-09 RX ORDER — ONDANSETRON 2 MG/ML
INJECTION INTRAMUSCULAR; INTRAVENOUS PRN
Status: DISCONTINUED | OUTPATIENT
Start: 2023-08-09 | End: 2023-08-09 | Stop reason: SDUPTHER

## 2023-08-09 RX ORDER — DOCUSATE SODIUM 100 MG/1
100 CAPSULE, LIQUID FILLED ORAL 2 TIMES DAILY PRN
Qty: 20 CAPSULE | Refills: 0 | Status: SHIPPED | OUTPATIENT
Start: 2023-08-09 | End: 2023-08-16

## 2023-08-09 RX ORDER — OXYCODONE HYDROCHLORIDE AND ACETAMINOPHEN 5; 325 MG/1; MG/1
1 TABLET ORAL EVERY 6 HOURS PRN
Qty: 20 TABLET | Refills: 0 | Status: SHIPPED | OUTPATIENT
Start: 2023-08-09 | End: 2023-08-16

## 2023-08-09 RX ORDER — OXYCODONE HYDROCHLORIDE 5 MG/1
5 TABLET ORAL
Status: COMPLETED | OUTPATIENT
Start: 2023-08-09 | End: 2023-08-09

## 2023-08-09 RX ADMIN — OXYCODONE HYDROCHLORIDE 5 MG: 5 TABLET ORAL at 09:52

## 2023-08-09 RX ADMIN — FENTANYL CITRATE 50 MCG: 50 INJECTION INTRAMUSCULAR; INTRAVENOUS at 08:06

## 2023-08-09 RX ADMIN — ACETAMINOPHEN 1000 MG: 500 TABLET ORAL at 07:18

## 2023-08-09 RX ADMIN — DEXAMETHASONE SODIUM PHOSPHATE 10 MG: 10 INJECTION, SOLUTION INTRAMUSCULAR; INTRAVENOUS at 07:39

## 2023-08-09 RX ADMIN — FENTANYL CITRATE 50 MCG: 50 INJECTION INTRAMUSCULAR; INTRAVENOUS at 09:21

## 2023-08-09 RX ADMIN — MIDAZOLAM 2 MG: 1 INJECTION INTRAMUSCULAR; INTRAVENOUS at 07:10

## 2023-08-09 RX ADMIN — FENTANYL CITRATE 50 MCG: 50 INJECTION INTRAMUSCULAR; INTRAVENOUS at 07:33

## 2023-08-09 RX ADMIN — Medication 2000 MG: at 07:39

## 2023-08-09 RX ADMIN — PROPOFOL 170 MG: 10 INJECTION, EMULSION INTRAVENOUS at 07:33

## 2023-08-09 RX ADMIN — LIDOCAINE HYDROCHLORIDE 80 MG: 20 INJECTION, SOLUTION EPIDURAL; INFILTRATION; INTRACAUDAL; PERINEURAL at 07:33

## 2023-08-09 RX ADMIN — SODIUM CHLORIDE, POTASSIUM CHLORIDE, SODIUM LACTATE AND CALCIUM CHLORIDE: 600; 310; 30; 20 INJECTION, SOLUTION INTRAVENOUS at 07:28

## 2023-08-09 RX ADMIN — FENTANYL CITRATE 50 MCG: 50 INJECTION INTRAMUSCULAR; INTRAVENOUS at 09:00

## 2023-08-09 RX ADMIN — FENTANYL CITRATE 50 MCG: 50 INJECTION INTRAMUSCULAR; INTRAVENOUS at 09:42

## 2023-08-09 RX ADMIN — ONDANSETRON 4 MG: 2 INJECTION INTRAMUSCULAR; INTRAVENOUS at 07:39

## 2023-08-09 RX ADMIN — SODIUM CHLORIDE, POTASSIUM CHLORIDE, SODIUM LACTATE AND CALCIUM CHLORIDE: 600; 310; 30; 20 INJECTION, SOLUTION INTRAVENOUS at 06:29

## 2023-08-09 ASSESSMENT — PAIN DESCRIPTION - DESCRIPTORS
DESCRIPTORS: SORE
DESCRIPTORS: SHARP
DESCRIPTORS: STABBING
DESCRIPTORS: SHARP

## 2023-08-09 ASSESSMENT — PAIN SCALES - GENERAL
PAINLEVEL_OUTOF10: 7
PAINLEVEL_OUTOF10: 7
PAINLEVEL_OUTOF10: 9
PAINLEVEL_OUTOF10: 4
PAINLEVEL_OUTOF10: 8
PAINLEVEL_OUTOF10: 4

## 2023-08-09 ASSESSMENT — PAIN DESCRIPTION - LOCATION
LOCATION: ANKLE

## 2023-08-09 ASSESSMENT — PAIN - FUNCTIONAL ASSESSMENT: PAIN_FUNCTIONAL_ASSESSMENT: 0-10

## 2023-08-09 ASSESSMENT — PAIN DESCRIPTION - ORIENTATION
ORIENTATION: RIGHT
ORIENTATION: RIGHT
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
## 2023-08-09 NOTE — ANESTHESIA POSTPROCEDURE EVALUATION
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     Osito Phase I: Osito Score: 10    Osito Phase II: Osito Score: 10      Anesthesia Post Evaluation    Complications: no

## 2023-08-09 NOTE — NURSE NAVIGATOR
Patient tolerated block,o2 and monitor continue on.procedure completed per protocol without incitement. Perry Reis

## 2023-08-09 NOTE — DISCHARGE INSTRUCTIONS
[address] [place] [address] [address] [place] [address]  Dept: [phone]  Loc: [phone]    Dr. Natalya Kent Post Operative Instructions (Lower Extremity)      Fluids and Diet:    Begin with clear liquids, broth, dry toast, and crackers. If not nauseated, then resume your regular pre-operative diet when you feel ready. Medications: Take your prescriptions as directed. You may resume your regularly scheduled medications (unless otherwise directed). If any side effects or adverse reactions occur, discontinue the medication and contact your doctor. Review the patient drug information that is provided before you take any medication. If you have a fracture or a surgery that involves placing hardware (screws, plates, joint replacement), avoid the routine use of NSAIDs for about 6 months if possible (due to a risk of delayed bone healing). Ambulation and Activity:    You are advised to go directly home from the hospital.  You may not put any weight on the operative foot (unless otherwise directed). Avoid stairs if possible. Do not lift or move heavy objects. Do not drive until cleared by your physician. Bandage and Wound Care Instructions:    Keep splint/dressing clean and dry. Do not shower or bathe the operative extremity. Do not remove the splint/dressing (unless otherwise directed); it will be removed at your first postoperative office visit at about 2 weeks. Do not attempt to put anything between the splint or dressing and your skin; some itching is normal.  If the overlying ace wrap feels too tight, you may gently loosen it. Call the office if you have any questions or concerns. Ice and Elevation:    Elevate operative extremity as often as possible to reduce swelling and discomfort during the first 2 weeks. For the first 2 weeks, keep it elevated almost around the clock, and don't leave it not elevated for longer than 15 minutes at a time.   Elevate with about 2-3

## 2023-08-09 NOTE — PROGRESS NOTES
I spoke to the patient before heading to the OR, and the operative site was identified, verified and marked. The procedure was verified. We have reviewed the risks, benefits, and alternatives to the procedure. No guarantees were made. I have also reviewed the history and physical. There are no significant changes in medical history. All questions were answered and they wish to proceed as planned.      Electronically signed by Dwight Bender MD

## 2023-08-10 ENCOUNTER — CLINICAL DOCUMENTATION (OUTPATIENT)
Dept: ORTHOPEDIC SURGERY | Age: 48
End: 2023-08-10

## 2023-08-24 ENCOUNTER — OFFICE VISIT (OUTPATIENT)
Dept: ORTHOPEDIC SURGERY | Age: 48
End: 2023-08-24

## 2023-08-24 VITALS — BODY MASS INDEX: 42.99 KG/M2 | RESPIRATION RATE: 16 BRPM | HEIGHT: 65 IN | OXYGEN SATURATION: 100 % | WEIGHT: 258 LBS

## 2023-08-24 DIAGNOSIS — M25.371 INSTABILITY OF RIGHT ANKLE JOINT: Primary | ICD-10-CM

## 2023-08-24 PROCEDURE — 99024 POSTOP FOLLOW-UP VISIT: CPT | Performed by: ORTHOPAEDIC SURGERY

## 2023-08-29 ENCOUNTER — TELEPHONE (OUTPATIENT)
Dept: ORTHOPEDIC SURGERY | Age: 48
End: 2023-08-29

## 2023-08-29 NOTE — TELEPHONE ENCOUNTER
Pt called this morning - had spoken to [facility] earlier re: ALON[facility] for [facility] -   she spoke to [facility]  and they are stating nothing has been rece'd- she is asking for paperwork be refaxed. I informed her to our knowledge it was sent successfully, we get a notice if was not rec'd.   Please advise pt whenever you refax and get that verification -   Respectfully/bb

## 2023-08-29 NOTE — TELEPHONE ENCOUNTER
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 I told her it was sent on 8/8/23 but she said there was a second part that she brought last week. This paperwork is so she gets paid. Please call patient back at [phone].

## 2023-08-29 NOTE — TELEPHONE ENCOUNTER
Spoke with patient. Informed her that the paperwork was faxed last night. She stated understanding and had no further questions.

## 2023-09-14 DIAGNOSIS — M25.371 INSTABILITY OF RIGHT ANKLE JOINT: Primary | ICD-10-CM

## 2023-09-18 DIAGNOSIS — M25.371 INSTABILITY OF RIGHT ANKLE JOINT: Primary | ICD-10-CM

## 2023-09-25 ENCOUNTER — OFFICE VISIT (OUTPATIENT)
Dept: ORTHOPEDIC SURGERY | Age: 48
End: 2023-09-25

## 2023-09-25 VITALS — HEIGHT: 65 IN | WEIGHT: 258 LBS | OXYGEN SATURATION: 100 % | RESPIRATION RATE: 16 BRPM | BODY MASS INDEX: 42.99 KG/M2

## 2023-09-25 DIAGNOSIS — M25.371 INSTABILITY OF RIGHT ANKLE JOINT: Primary | ICD-10-CM

## 2023-09-25 PROCEDURE — 99024 POSTOP FOLLOW-UP VISIT: CPT | Performed by: ORTHOPAEDIC SURGERY

## 2023-10-18 ENCOUNTER — HOSPITAL ENCOUNTER (OUTPATIENT)
Dept: PREADMISSION TESTING | Age: 48
Discharge: HOME | End: 2023-10-18
Payer: COMMERCIAL

## 2023-10-18 VITALS
RESPIRATION RATE: 16 BRPM | WEIGHT: 261.6 LBS | BODY MASS INDEX: 43.58 KG/M2 | SYSTOLIC BLOOD PRESSURE: 134 MMHG | HEART RATE: 74 BPM | TEMPERATURE: 97.5 F | OXYGEN SATURATION: 96 % | DIASTOLIC BLOOD PRESSURE: 78 MMHG | HEIGHT: 65 IN

## 2023-10-18 LAB
ANION GAP SERPL CALCULATED.3IONS-SCNC: 10 MMOL/L (ref 9–17)
BUN SERPL-MCNC: 12 MG/DL (ref 6–20)
BUN/CREAT SERPL: 20 (ref 9–20)
CALCIUM SERPL-MCNC: 9.4 MG/DL (ref 8.6–10.4)
CHLORIDE SERPL-SCNC: 105 MMOL/L (ref 98–107)
CO2 SERPL-SCNC: 25 MMOL/L (ref 20–31)
CREAT SERPL-MCNC: 0.6 MG/DL (ref 0.5–0.9)
ERYTHROCYTE [DISTWIDTH] IN BLOOD BY AUTOMATED COUNT: 13.6 % (ref 11.8–14.4)
GFR SERPL CREATININE-BSD FRML MDRD: >60 ML/MIN/1.73M2
GLUCOSE SERPL-MCNC: 122 MG/DL (ref 70–99)
HCT VFR BLD AUTO: 44.2 % (ref 36.3–47.1)
HGB BLD-MCNC: 14.8 G/DL (ref 11.9–15.1)
MCH RBC QN AUTO: 29.8 PG (ref 25.2–33.5)
MCHC RBC AUTO-ENTMCNC: 33.5 G/DL (ref 28.4–34.8)
MCV RBC AUTO: 88.9 FL (ref 82.6–102.9)
NRBC BLD-RTO: 0 PER 100 WBC
PLATELET # BLD AUTO: 225 K/UL (ref 138–453)
PMV BLD AUTO: 8.5 FL (ref 8.1–13.5)
POTASSIUM SERPL-SCNC: 4.3 MMOL/L (ref 3.7–5.3)
RBC # BLD AUTO: 4.97 M/UL (ref 3.95–5.11)
SODIUM SERPL-SCNC: 140 MMOL/L (ref 135–144)
WBC OTHER # BLD: 7.5 K/UL (ref 3.5–11.3)

## 2023-10-18 PROCEDURE — 80048 BASIC METABOLIC PNL TOTAL CA: CPT

## 2023-10-18 PROCEDURE — 36415 COLL VENOUS BLD VENIPUNCTURE: CPT

## 2023-10-18 PROCEDURE — 85027 COMPLETE CBC AUTOMATED: CPT

## 2023-10-18 RX ORDER — ACETAMINOPHEN 500 MG
1000 TABLET ORAL ONCE
OUTPATIENT
Start: 2023-11-08

## 2023-10-18 ASSESSMENT — PAIN SCALES - GENERAL: PAINLEVEL_OUTOF10: 5

## 2023-10-18 ASSESSMENT — PAIN DESCRIPTION - LOCATION: LOCATION: ANKLE

## 2023-10-18 ASSESSMENT — PAIN DESCRIPTION - ORIENTATION: ORIENTATION: RIGHT

## 2023-10-18 ASSESSMENT — PAIN DESCRIPTION - DESCRIPTORS: DESCRIPTORS: SHOOTING

## 2023-10-18 NOTE — PRE-PROCEDURE INSTRUCTIONS
On the Day of Your Surgery, Wednesday, 11/8/2023, Please Arrive At 0545 AM     Enter the hospital through the Main Entrance, take the lobby elevators to the second floor and check in at the Surgery Registration desk. Continue to take your home medications as you normally do up to and including the night before surgery with the exception of blood thinning medications. Blood Thinning Medications:  Please stop prescription blood thinning medications such as Apixaban (Eliquis); Clopidogrel (Plavix); Dabigatran (Pradaxa); Prasugrel (Effient); Rivaroxaban (Xarelto); Ticagrelor (Brilinta); Warfarin (Coumadin) only as directed by your surgeon and/or the prescribing physician    Some common examples of other medications that can thin your blood are: Aspirin, Ibuprofen (Advil, Motrin), Naproxen (Aleve), Meloxicam (Mobic), Celecoxib (Celebrex), Fish Oil, many Herbal Supplements. These medications should usually be stopped at least 7 days prior to surgery. none    Tylenol is OK to take for pain the week prior to surgery. Failure to stop certain medications may interfere with your scheduled surgery. If you receive instructions from your surgeon regarding what medications to stop prior to surgery, please follow those specific instructions. If You Have Diabetes:  Do not take any of your diabetic medications, (injectables or by mouth) the morning of surgery unless otherwise instructed by the doctor who manages your diabetes. If you are taking insulin, contact the doctor the manages your diabetes for instructions about any changes to your insulin dosages the day before surgery. Please take the following medication(s) the day of surgery with small sips of water:              Buspirone, Desvenlafaxine succinate    Please use your inhaler(s) if needed and bring your inhaler(s) from home the day of surgery. Showering Before Surgery:      You can play an important role in your own health by carefully washing includes no gum, hard candy, mints or water. The only exception to this is small sips of water to take the medications listed above. No smoking or chewing tobacco after midnight. No alcoholic beverages for 24 hours prior to surgery. 2. You may brush your teeth but do not swallow the water. 3. If you wear glasses bring a case for them if you have one. No contacts should be worn the day of surgery. You may also bring your hearing aids. If you have dentures, most surgical procedures involving anesthesia will require that you remove them prior to surgery. 4. If you sleep with a CPAP or BiPAP machine at home and plan on staying in the hospital overnight after surgery, please bring your machine with you. 5. Do not wear any jewelry or body piercings the day of surgery. No nail polish on the operative extremity (arm/hand or leg/foot surgeries)   6. If you are staying overnight with us, you may bring a small bag of necessary personal items. 7. Please wear loose, comfortable clothing. If you are potentially going to have a cast, sling, brace or bulky dressing, make sure to wear clothing that will fit over it. 8. In case of illness - If you have cold or flu like symptoms (high fever, runny nose, sore throat, cough, etc.) rash, nausea, vomiting, loose stools, and/or recent contact with someone who has a contagious disease (chicken pox, measles, COVID-19, etc.). Please call your surgeon before coming to the hospital.    Transportation After Your Surgery/Procedure: If you are going home the same day of surgery you need someone to drive you home. Your  must be at least 25years of age. A taxi cab or other nonmedical public transportation is not acceptable unless you have someone to ride home in the vehicle with you. For your safety, someone must remain with you for the first 24 hours after your surgery if you receive anesthesia or medication.   If you do not have someone to stay with you, your

## 2023-11-02 ENCOUNTER — OFFICE VISIT (OUTPATIENT)
Dept: ORTHOPEDIC SURGERY | Age: 48
End: 2023-11-02

## 2023-11-02 VITALS — RESPIRATION RATE: 16 BRPM | OXYGEN SATURATION: 100 % | HEIGHT: 65 IN | BODY MASS INDEX: 43.53 KG/M2

## 2023-11-02 DIAGNOSIS — M25.371 INSTABILITY OF RIGHT ANKLE JOINT: Primary | ICD-10-CM

## 2023-11-02 PROCEDURE — 99024 POSTOP FOLLOW-UP VISIT: CPT | Performed by: ORTHOPAEDIC SURGERY

## 2023-11-02 NOTE — PROGRESS NOTES
healing, nonunion, malunion, failure of hardware/fixation/surgery, iatrogenic fracture/dislocation, damage to bone/joint(s), worsening of condition, recurrence, limb length discrepancy, avascular necrosis of bone, neurovascular compromise/compartment syndrome, tourniquet complications, failure of surgery, dissatisfaction with outcome, loss of limb, stroke, heart attack, pulmonary embolus, mental status change, anesthesia risks/reaction, and even death. They expressed verbal understanding of the risks and wish to proceed with surgical intervention. All questions were answered. No guarantees were made or implied. Informed consent was obtained. All questions were answered and the patient agrees with the above plan. The patient will return to clinic postoperatively. No follow-ups on file. No orders of the defined types were placed in this encounter. No orders of the defined types were placed in this encounter. Linn Teresa MD  Orthopedic Surgery        Please excuse any typos/errors, as this note was created with the assistance of voice recognition software. While intending to generate a document that actually reflects the content of the visit, the document can still have some errors including those of syntax and sound-a-like substitutions which may escape proof reading. In such instances, actual meaning can be extrapolated by context.

## 2023-11-08 ENCOUNTER — HOSPITAL ENCOUNTER (OUTPATIENT)
Age: 48
Setting detail: OUTPATIENT SURGERY
Discharge: HOME | End: 2023-11-08
Attending: ORTHOPAEDIC SURGERY | Admitting: ORTHOPAEDIC SURGERY
Payer: COMMERCIAL

## 2023-11-08 ENCOUNTER — ANESTHESIA (OUTPATIENT)
Dept: OPERATING ROOM | Age: 48
End: 2023-11-08
Payer: COMMERCIAL

## 2023-11-08 ENCOUNTER — ANESTHESIA EVENT (OUTPATIENT)
Dept: OPERATING ROOM | Age: 48
End: 2023-11-08
Payer: COMMERCIAL

## 2023-11-08 ENCOUNTER — APPOINTMENT (OUTPATIENT)
Dept: GENERAL RADIOLOGY | Age: 48
End: 2023-11-08
Attending: ORTHOPAEDIC SURGERY
Payer: COMMERCIAL

## 2023-11-08 VITALS
RESPIRATION RATE: 20 BRPM | BODY MASS INDEX: 43.56 KG/M2 | TEMPERATURE: 97.3 F | WEIGHT: 261.47 LBS | DIASTOLIC BLOOD PRESSURE: 78 MMHG | HEIGHT: 65 IN | SYSTOLIC BLOOD PRESSURE: 132 MMHG | HEART RATE: 72 BPM | OXYGEN SATURATION: 91 %

## 2023-11-08 DIAGNOSIS — M25.371 INSTABILITY OF RIGHT ANKLE JOINT: Primary | ICD-10-CM

## 2023-11-08 PROCEDURE — 3600000002 HC SURGERY LEVEL 2 BASE: Performed by: ORTHOPAEDIC SURGERY

## 2023-11-08 PROCEDURE — 6360000002 HC RX W HCPCS

## 2023-11-08 PROCEDURE — 6370000000 HC RX 637 (ALT 250 FOR IP): Performed by: ANESTHESIOLOGY

## 2023-11-08 PROCEDURE — 2720000010 HC SURG SUPPLY STERILE: Performed by: ORTHOPAEDIC SURGERY

## 2023-11-08 PROCEDURE — C1776 JOINT DEVICE (IMPLANTABLE): HCPCS | Performed by: ORTHOPAEDIC SURGERY

## 2023-11-08 PROCEDURE — 3700000001 HC ADD 15 MINUTES (ANESTHESIA): Performed by: ORTHOPAEDIC SURGERY

## 2023-11-08 PROCEDURE — 7100000010 HC PHASE II RECOVERY - FIRST 15 MIN: Performed by: ORTHOPAEDIC SURGERY

## 2023-11-08 PROCEDURE — 6360000002 HC RX W HCPCS: Performed by: ANESTHESIOLOGY

## 2023-11-08 PROCEDURE — 3700000000 HC ANESTHESIA ATTENDED CARE: Performed by: ORTHOPAEDIC SURGERY

## 2023-11-08 PROCEDURE — 6360000002 HC RX W HCPCS: Performed by: NURSE ANESTHETIST, CERTIFIED REGISTERED

## 2023-11-08 PROCEDURE — 2709999900 HC NON-CHARGEABLE SUPPLY: Performed by: ORTHOPAEDIC SURGERY

## 2023-11-08 PROCEDURE — 64445 NJX AA&/STRD SCIATIC NRV IMG: CPT | Performed by: ANESTHESIOLOGY

## 2023-11-08 PROCEDURE — 2580000003 HC RX 258: Performed by: ANESTHESIOLOGY

## 2023-11-08 PROCEDURE — 6370000000 HC RX 637 (ALT 250 FOR IP): Performed by: ORTHOPAEDIC SURGERY

## 2023-11-08 PROCEDURE — 2500000003 HC RX 250 WO HCPCS: Performed by: ANESTHESIOLOGY

## 2023-11-08 PROCEDURE — 7100000001 HC PACU RECOVERY - ADDTL 15 MIN: Performed by: ORTHOPAEDIC SURGERY

## 2023-11-08 PROCEDURE — C1713 ANCHOR/SCREW BN/BN,TIS/BN: HCPCS | Performed by: ORTHOPAEDIC SURGERY

## 2023-11-08 PROCEDURE — 2500000003 HC RX 250 WO HCPCS: Performed by: NURSE ANESTHETIST, CERTIFIED REGISTERED

## 2023-11-08 PROCEDURE — 7100000000 HC PACU RECOVERY - FIRST 15 MIN: Performed by: ORTHOPAEDIC SURGERY

## 2023-11-08 PROCEDURE — 6360000002 HC RX W HCPCS: Performed by: ORTHOPAEDIC SURGERY

## 2023-11-08 PROCEDURE — 7100000011 HC PHASE II RECOVERY - ADDTL 15 MIN: Performed by: ORTHOPAEDIC SURGERY

## 2023-11-08 PROCEDURE — 3600000012 HC SURGERY LEVEL 2 ADDTL 15MIN: Performed by: ORTHOPAEDIC SURGERY

## 2023-11-08 DEVICE — IMPLANTABLE DEVICE: Type: IMPLANTABLE DEVICE | Site: ANKLE | Status: FUNCTIONAL

## 2023-11-08 DEVICE — GRAFT HUM TISS SEMITENDINOSUS TEND NONIRRADIATED FRZN: Type: IMPLANTABLE DEVICE | Site: ANKLE | Status: FUNCTIONAL

## 2023-11-08 RX ORDER — LIDOCAINE HYDROCHLORIDE 10 MG/ML
1 INJECTION, SOLUTION EPIDURAL; INFILTRATION; INTRACAUDAL; PERINEURAL
Status: DISCONTINUED | OUTPATIENT
Start: 2023-11-09 | End: 2023-11-08 | Stop reason: HOSPADM

## 2023-11-08 RX ORDER — DEXAMETHASONE SODIUM PHOSPHATE 4 MG/ML
INJECTION, SOLUTION INTRA-ARTICULAR; INTRALESIONAL; INTRAMUSCULAR; INTRAVENOUS; SOFT TISSUE PRN
Status: DISCONTINUED | OUTPATIENT
Start: 2023-11-08 | End: 2023-11-08 | Stop reason: SDUPTHER

## 2023-11-08 RX ORDER — GINSENG 100 MG
CAPSULE ORAL ONCE
Status: COMPLETED | OUTPATIENT
Start: 2023-11-08 | End: 2023-11-08

## 2023-11-08 RX ORDER — ASPIRIN 325 MG
325 TABLET, DELAYED RELEASE (ENTERIC COATED) ORAL DAILY
Qty: 42 TABLET | Refills: 0 | Status: SHIPPED | OUTPATIENT
Start: 2023-11-08

## 2023-11-08 RX ORDER — SULFAMETHOXAZOLE AND TRIMETHOPRIM 800; 160 MG/1; MG/1
1 TABLET ORAL 2 TIMES DAILY
Qty: 10 TABLET | Refills: 0 | Status: SHIPPED | OUTPATIENT
Start: 2023-11-08 | End: 2023-11-13

## 2023-11-08 RX ORDER — ROPIVACAINE HYDROCHLORIDE 5 MG/ML
INJECTION, SOLUTION EPIDURAL; INFILTRATION; PERINEURAL PRN
Status: DISCONTINUED | OUTPATIENT
Start: 2023-11-08 | End: 2023-11-08 | Stop reason: SDUPTHER

## 2023-11-08 RX ORDER — ONDANSETRON 4 MG/1
4 TABLET, FILM COATED ORAL EVERY 8 HOURS PRN
Qty: 20 TABLET | Refills: 0 | Status: SHIPPED | OUTPATIENT
Start: 2023-11-08 | End: 2023-11-15

## 2023-11-08 RX ORDER — LIDOCAINE HYDROCHLORIDE 10 MG/ML
INJECTION, SOLUTION INFILTRATION; PERINEURAL PRN
Status: DISCONTINUED | OUTPATIENT
Start: 2023-11-08 | End: 2023-11-08 | Stop reason: SDUPTHER

## 2023-11-08 RX ORDER — FENTANYL CITRATE 50 UG/ML
INJECTION, SOLUTION INTRAMUSCULAR; INTRAVENOUS
Status: COMPLETED
Start: 2023-11-08 | End: 2023-11-08

## 2023-11-08 RX ORDER — FENTANYL CITRATE 50 UG/ML
25 INJECTION, SOLUTION INTRAMUSCULAR; INTRAVENOUS EVERY 5 MIN PRN
Status: DISCONTINUED | OUTPATIENT
Start: 2023-11-08 | End: 2023-11-08 | Stop reason: HOSPADM

## 2023-11-08 RX ORDER — SODIUM CHLORIDE 9 MG/ML
INJECTION, SOLUTION INTRAVENOUS CONTINUOUS
Status: DISCONTINUED | OUTPATIENT
Start: 2023-11-08 | End: 2023-11-08

## 2023-11-08 RX ORDER — ROCURONIUM BROMIDE 10 MG/ML
INJECTION, SOLUTION INTRAVENOUS PRN
Status: DISCONTINUED | OUTPATIENT
Start: 2023-11-08 | End: 2023-11-08 | Stop reason: SDUPTHER

## 2023-11-08 RX ORDER — FENTANYL CITRATE 50 UG/ML
INJECTION, SOLUTION INTRAMUSCULAR; INTRAVENOUS PRN
Status: DISCONTINUED | OUTPATIENT
Start: 2023-11-08 | End: 2023-11-08 | Stop reason: SDUPTHER

## 2023-11-08 RX ORDER — ONDANSETRON 2 MG/ML
INJECTION INTRAMUSCULAR; INTRAVENOUS PRN
Status: DISCONTINUED | OUTPATIENT
Start: 2023-11-08 | End: 2023-11-08 | Stop reason: SDUPTHER

## 2023-11-08 RX ORDER — SODIUM CHLORIDE, SODIUM LACTATE, POTASSIUM CHLORIDE, CALCIUM CHLORIDE 600; 310; 30; 20 MG/100ML; MG/100ML; MG/100ML; MG/100ML
INJECTION, SOLUTION INTRAVENOUS CONTINUOUS
Status: DISCONTINUED | OUTPATIENT
Start: 2023-11-08 | End: 2023-11-08 | Stop reason: HOSPADM

## 2023-11-08 RX ORDER — DEXAMETHASONE SODIUM PHOSPHATE 10 MG/ML
INJECTION, SOLUTION INTRAMUSCULAR; INTRAVENOUS PRN
Status: DISCONTINUED | OUTPATIENT
Start: 2023-11-08 | End: 2023-11-08 | Stop reason: SDUPTHER

## 2023-11-08 RX ORDER — LIDOCAINE HYDROCHLORIDE 20 MG/ML
INJECTION, SOLUTION EPIDURAL; INFILTRATION; INTRACAUDAL; PERINEURAL PRN
Status: DISCONTINUED | OUTPATIENT
Start: 2023-11-08 | End: 2023-11-08 | Stop reason: SDUPTHER

## 2023-11-08 RX ORDER — MIDAZOLAM HYDROCHLORIDE 1 MG/ML
2 INJECTION INTRAMUSCULAR; INTRAVENOUS ONCE
Status: COMPLETED | OUTPATIENT
Start: 2023-11-08 | End: 2023-11-08

## 2023-11-08 RX ORDER — OXYCODONE HYDROCHLORIDE AND ACETAMINOPHEN 5; 325 MG/1; MG/1
1 TABLET ORAL EVERY 6 HOURS PRN
Qty: 20 TABLET | Refills: 0 | Status: SHIPPED | OUTPATIENT
Start: 2023-11-08 | End: 2023-11-15

## 2023-11-08 RX ORDER — ACETAMINOPHEN 500 MG
1000 TABLET ORAL ONCE
Status: COMPLETED | OUTPATIENT
Start: 2023-11-08 | End: 2023-11-08

## 2023-11-08 RX ORDER — PROPOFOL 10 MG/ML
INJECTION, EMULSION INTRAVENOUS PRN
Status: DISCONTINUED | OUTPATIENT
Start: 2023-11-08 | End: 2023-11-08 | Stop reason: SDUPTHER

## 2023-11-08 RX ORDER — DOCUSATE SODIUM 100 MG/1
100 CAPSULE, LIQUID FILLED ORAL 2 TIMES DAILY PRN
Qty: 20 CAPSULE | Refills: 0 | Status: SHIPPED | OUTPATIENT
Start: 2023-11-08 | End: 2023-11-15

## 2023-11-08 RX ORDER — OXYCODONE HYDROCHLORIDE 5 MG/1
5 TABLET ORAL ONCE
Status: COMPLETED | OUTPATIENT
Start: 2023-11-08 | End: 2023-11-08

## 2023-11-08 RX ADMIN — ACETAMINOPHEN 1000 MG: 500 TABLET ORAL at 09:17

## 2023-11-08 RX ADMIN — DEXAMETHASONE SODIUM PHOSPHATE 4 MG: 10 INJECTION, SOLUTION INTRAMUSCULAR; INTRAVENOUS at 10:20

## 2023-11-08 RX ADMIN — ROPIVACAINE HYDROCHLORIDE 10 ML: 5 INJECTION EPIDURAL; INFILTRATION; PERINEURAL at 09:26

## 2023-11-08 RX ADMIN — ONDANSETRON 4 MG: 2 INJECTION INTRAMUSCULAR; INTRAVENOUS at 12:29

## 2023-11-08 RX ADMIN — SUGAMMADEX 200 MG: 100 INJECTION, SOLUTION INTRAVENOUS at 12:32

## 2023-11-08 RX ADMIN — PROPOFOL 200 MG: 10 INJECTION, EMULSION INTRAVENOUS at 10:20

## 2023-11-08 RX ADMIN — LIDOCAINE HYDROCHLORIDE 60 MG: 20 INJECTION, SOLUTION EPIDURAL; INFILTRATION; INTRACAUDAL; PERINEURAL at 10:20

## 2023-11-08 RX ADMIN — LIDOCAINE HYDROCHLORIDE 3 MG: 10 INJECTION, SOLUTION INFILTRATION; PERINEURAL at 09:24

## 2023-11-08 RX ADMIN — FENTANYL CITRATE 25 MCG: 50 INJECTION INTRAMUSCULAR; INTRAVENOUS at 13:24

## 2023-11-08 RX ADMIN — SODIUM CHLORIDE, POTASSIUM CHLORIDE, SODIUM LACTATE AND CALCIUM CHLORIDE: 600; 310; 30; 20 INJECTION, SOLUTION INTRAVENOUS at 07:54

## 2023-11-08 RX ADMIN — ROCURONIUM BROMIDE 50 MG: 10 INJECTION, SOLUTION INTRAVENOUS at 10:20

## 2023-11-08 RX ADMIN — MIDAZOLAM 2 MG: 1 INJECTION INTRAMUSCULAR; INTRAVENOUS at 09:19

## 2023-11-08 RX ADMIN — OXYCODONE HYDROCHLORIDE 5 MG: 5 TABLET ORAL at 13:53

## 2023-11-08 RX ADMIN — ROPIVACAINE HYDROCHLORIDE 30 ML: 5 INJECTION EPIDURAL; INFILTRATION; PERINEURAL at 09:24

## 2023-11-08 RX ADMIN — FENTANYL CITRATE 100 MCG: 50 INJECTION INTRAMUSCULAR; INTRAVENOUS at 10:20

## 2023-11-08 RX ADMIN — PROPOFOL 100 MG: 10 INJECTION, EMULSION INTRAVENOUS at 12:23

## 2023-11-08 RX ADMIN — Medication 2000 MG: at 10:30

## 2023-11-08 RX ADMIN — FENTANYL CITRATE 25 MCG: 50 INJECTION, SOLUTION INTRAMUSCULAR; INTRAVENOUS at 13:24

## 2023-11-08 RX ADMIN — DEXAMETHASONE SODIUM PHOSPHATE 4 MG: 4 INJECTION, SOLUTION INTRAMUSCULAR; INTRAVENOUS at 09:24

## 2023-11-08 ASSESSMENT — PAIN SCALES - GENERAL
PAINLEVEL_OUTOF10: 4
PAINLEVEL_OUTOF10: 7
PAINLEVEL_OUTOF10: 4
PAINLEVEL_OUTOF10: 4
PAINLEVEL_OUTOF10: 2

## 2023-11-08 ASSESSMENT — PAIN DESCRIPTION - DESCRIPTORS
DESCRIPTORS: SHOOTING
DESCRIPTORS: ACHING

## 2023-11-08 ASSESSMENT — PAIN - FUNCTIONAL ASSESSMENT: PAIN_FUNCTIONAL_ASSESSMENT: 0-10

## 2023-11-08 ASSESSMENT — PAIN DESCRIPTION - ORIENTATION
ORIENTATION: RIGHT

## 2023-11-08 ASSESSMENT — PAIN DESCRIPTION - LOCATION
LOCATION: ANKLE

## 2023-11-08 NOTE — DISCHARGE INSTRUCTIONS
[address] [place]  [address] [address]  Dept: [phone]  Loc: [phone]    Dr. Chelsy Betts Post Operative Instructions (Lower Extremity)      Fluids and Diet:    Begin with clear liquids, broth, dry toast, and crackers. If not nauseated, then resume your regular pre-operative diet when you feel ready. Medications: Take your prescriptions as directed. You may resume your regularly scheduled medications (unless otherwise directed). If any side effects or adverse reactions occur, discontinue the medication and contact your doctor. Review the patient drug information that is provided before you take any medication. If you have a fracture or a surgery that involves placing hardware (screws, plates, joint replacement), avoid the routine use of NSAIDs for about 6 months if possible (due to a risk of delayed bone healing). Ambulation and Activity:    You are advised to go directly home from the hospital.  You may not put any weight on the operative foot (unless otherwise directed). Avoid stairs if possible. Do not lift or move heavy objects. Do not drive until cleared by your physician. Bandage and Wound Care Instructions:    Keep splint/dressing clean and dry. Do not shower or bathe the operative extremity. Do not remove the splint/dressing (unless otherwise directed); it will be removed at your first postoperative office visit at about 2 weeks. Do not attempt to put anything between the splint or dressing and your skin; some itching is normal.  If the overlying ace wrap feels too tight, you may gently loosen it. Call the office if you have any questions or concerns. Ice and Elevation:    Elevate operative extremity as often as possible to reduce swelling and discomfort during the first 2 weeks. For the first 2 weeks, keep it elevated almost around the clock, and don't leave it not elevated for longer than 15 minutes at a time.   Elevate with about 2-3

## 2023-11-08 NOTE — ANESTHESIA PRE PROCEDURE
01:13 PM    HCT 44.2 10/18/2023 01:13 PM    MCV 88.9 10/18/2023 01:13 PM    RDW 13.6 10/18/2023 01:13 PM     10/18/2023 01:13 PM       CMP:   Lab Results   Component Value Date/Time     10/18/2023 01:13 PM    K 4.3 10/18/2023 01:13 PM     10/18/2023 01:13 PM    CO2 25 10/18/2023 01:13 PM    BUN 12 10/18/2023 01:13 PM    CREATININE 0.6 10/18/2023 01:13 PM    LABGLOM >60 10/18/2023 01:13 PM    GLUCOSE 122 10/18/2023 01:13 PM    CALCIUM 9.4 10/18/2023 01:13 PM       POC Tests: No results for input(s): \"POCGLU\", \"POCNA\", \"POCK\", \"POCCL\", \"POCBUN\", \"POCHEMO\", \"POCHCT\" in the last 72 hours. Coags: No results found for: \"PROTIME\", \"INR\", \"APTT\"    HCG (If Applicable): No results found for: \"PREGTESTUR\", \"PREGSERUM\", \"HCG\", \"HCGQUANT\"     ABGs: No results found for: \"PHART\", \"PO2ART\", \"XOJ2EJR\", \"JKR6ALJ\", \"BEART\", \"T3XXFXNX\"     Type & Screen (If Applicable):  No results found for: \"LABABO\", \"LABRH\"    Drug/Infectious Status (If Applicable):  No results found for: \"HIV\", \"HEPCAB\"    COVID-19 Screening (If Applicable): No results found for: \"COVID19\"        Anesthesia Evaluation  Patient summary reviewed and Nursing notes reviewed no history of anesthetic complications:   Airway: Mallampati: I  TM distance: >3 FB   Neck ROM: full  Mouth opening: > = 3 FB   Dental: normal exam         Pulmonary:       (-) COPD and asthma                           Cardiovascular:  Exercise tolerance: good (>4 METS),       (-) pacemaker, past MI, CAD and  angina        Rate: normal                    Neuro/Psych:   (+) psychiatric history:depression/anxiety             GI/Hepatic/Renal:   (+) renal disease: kidney stones,      (-) GERD       Endo/Other:    (+) : arthritis:., .    (-) diabetes mellitus               Abdominal:             Vascular: Other Findings:             Anesthesia Plan      general     ASA 3       Induction: intravenous.     MIPS: Postoperative opioids intended and Prophylactic antiemetics

## 2023-11-08 NOTE — H&P
History and Physical Service   [facility] [place] [facility]    HISTORY AND PHYSICAL EXAMINATION            Date of Evaluation: 11/8/2023  Patient name:  Lorena Little  MRN:   [id number]  YOB: 1975  PCP:    CAYETANO Delacruz CNP    History Obtained From:     Patient, medical records    History of Present Illness: This is Lorena Little a 57 y.o. female who presents today for a RIGHT LATERAL ANKLE LIGAMENT ALLOGRAFT  RECONSTRUCTION, RIGHT ANKLE ARTHROSCOPY by Janell Quiroz MD for Right ankle instability. Denies fever, chills, shortness of breath, cough, congestion, wheezing, chest pain, open sores or wounds. COPIED from PROGRESS NOTE by Dr Chucho Baird dated 11/02/2023    She has a history of right ankle pain with ankle instability and ankle joint synovitis, along with a component of plantar fasciitis with a gastroc equinus contracture. She is status post right ankle lateral ligamentous stabilization, arthroscopy with debridement of synovitis, and gastroc recession (on 3/18/2022). Her postoperative course was complicated by the development of recurrent ankle ligamentous instability (positive anterior drawer, positive talar tilt). A right ankle MRI on 4/26/2023 showed a medial talar dome osteochondral defect, with moderate underlying tibiotalar joint arthritis, as well as moderate arthrosis of the subtalar joint and naviculocuneiform joint, along with diffuse mild degenerative changes and synovitis of the subtalar and talonavicular joints. A right ankle CT scan on 7/13/2023 showed expected postsurgical change in the fibula as well as subcortical cystic change in the medial talar dome. A right ankle injection on 5/1/2023 helped her pain approximately 50% for about 2 weeks.                 She also has a history of right foot pain, secondary to posterior tibial tendinitis along with pes planus, as well as some degenerative changes of the subtalar joint (a right subtalar joint

## 2023-11-08 NOTE — PROGRESS NOTES
I spoke to the patient before heading to the OR, and the operative site was identified, verified and marked. The procedure was verified. We have reviewed the risks, benefits, and alternatives to the procedure. No guarantees were made. I have also reviewed the history and physical. There are no significant changes in medical history. All questions were answered and they wish to proceed as planned.      Electronically signed by Anthony Noel MD

## 2023-11-08 NOTE — ANESTHESIA POSTPROCEDURE EVALUATION
Department of Anesthesiology  Postprocedure Note    Patient: Lionel Khan  MRN: [id number]  YOB: 1975  Date of evaluation: 11/8/2023      Procedure Summary     Date: 11/08/23 Room / Location: [address] [place] [address]    Anesthesia Start: 1177 Anesthesia Stop: 1897    Procedure: RIGHT LATERAL ANKLE LIGAMENT ALLOGRAFT  RECONSTRUCTION (Right: Ankle) Diagnosis:       Right ankle instability      (Right ankle instability [M25.371])    Surgeons: Mary Lou Engel MD Responsible Provider: Jovani Granados DO    Anesthesia Type: General ASA Status: 3          Anesthesia Type: General    Osito Phase I: Osito Score: 10    Osito Phase II:        Anesthesia Post Evaluation    Patient location during evaluation: PACU  Patient participation: complete - patient participated  Level of consciousness: awake and alert  Airway patency: patent  Nausea & Vomiting: no nausea and no vomiting  Complications: no  Cardiovascular status: hemodynamically stable  Respiratory status: acceptable  Hydration status: stable  Pain management: adequate

## 2023-11-08 NOTE — ANESTHESIA PROCEDURE NOTES
Peripheral Block    Patient location during procedure: pre-op  Reason for block: post-op pain management and at surgeon's request  Start time: 11/8/2023 9:18 AM  End time: 11/8/2023 9:26 AM  Staffing  Performed: anesthesiologist   Anesthesiologist: Destiny Dill DO  Performed by: Destiny Dill DO  Authorized by: Destiny Dill DO    Preanesthetic Checklist  Completed: patient identified, IV checked, site marked, risks and benefits discussed, surgical/procedural consents, equipment checked, pre-op evaluation, timeout performed, anesthesia consent given, oxygen available and monitors applied/VS acknowledged  Peripheral Block   Patient position: supine  Prep: ChloraPrep  Provider prep: mask and sterile gloves  Patient monitoring: cardiac monitor, continuous pulse ox, frequent blood pressure checks and IV access  Block type: Sciatic and Saphenous  Laterality: right  Injection technique: single-shot  Guidance: ultrasound guided  Local infiltration: lidocaine  Infiltration strength: 1 %  Local infiltration: lidocaine  Dose: 3 mL    Needle   Needle type: insulated echogenic nerve stimulator needle   Needle gauge: 21 G  Needle localization: ultrasound guidance  Needle length: 10 cm  Assessment   Injection assessment: negative aspiration for heme, no paresthesia on injection and local visualized surrounding nerve on ultrasound  Paresthesia pain: none  Slow fractionated injection: yes  Hemodynamics: stable  Real-time US image taken/store: yes  Outcomes: uncomplicated    Additional Notes  Right popliteal single shot   30ml 0.5% ropivacaine + 4mg decadron     Right saphenous single shot   10ml 0.5% ropivacaine

## 2023-11-09 ENCOUNTER — CLINICAL DOCUMENTATION (OUTPATIENT)
Dept: ORTHOPEDIC SURGERY | Age: 48
End: 2023-11-09

## 2023-11-16 DIAGNOSIS — M95.8 OSTEOCHONDRAL DEFECT OF ANKLE: ICD-10-CM

## 2023-11-16 DIAGNOSIS — M25.371 INSTABILITY OF RIGHT ANKLE JOINT: Primary | ICD-10-CM

## 2023-11-16 RX ORDER — HYDROCODONE BITARTRATE AND ACETAMINOPHEN 5; 325 MG/1; MG/1
1 TABLET ORAL EVERY 6 HOURS PRN
Qty: 10 TABLET | Refills: 0 | Status: SHIPPED | OUTPATIENT
Start: 2023-11-16 | End: 2023-11-23

## 2023-11-22 DIAGNOSIS — M25.371 INSTABILITY OF RIGHT ANKLE JOINT: ICD-10-CM

## 2023-11-22 DIAGNOSIS — M95.8 OSTEOCHONDRAL DEFECT OF ANKLE: ICD-10-CM

## 2023-11-22 RX ORDER — HYDROCODONE BITARTRATE AND ACETAMINOPHEN 5; 325 MG/1; MG/1
1 TABLET ORAL EVERY 6 HOURS PRN
Qty: 10 TABLET | Refills: 0 | Status: CANCELLED | OUTPATIENT
Start: 2023-11-22 | End: 2023-11-29

## 2023-11-22 NOTE — TELEPHONE ENCOUNTER
Dos 11/08/23    Last refill 11/16/23    End date 11/23/23      Dany Starkey [id number] - Luz Dickinson, [address] [facility]

## 2023-11-27 RX ORDER — HYDROCODONE BITARTRATE AND ACETAMINOPHEN 5; 325 MG/1; MG/1
1 TABLET ORAL EVERY 6 HOURS PRN
Qty: 10 TABLET | Refills: 0 | Status: SHIPPED | OUTPATIENT
Start: 2023-11-27 | End: 2023-12-04

## 2023-11-28 ENCOUNTER — OFFICE VISIT (OUTPATIENT)
Dept: ORTHOPEDIC SURGERY | Age: 48
End: 2023-11-28

## 2023-11-28 VITALS — OXYGEN SATURATION: 100 % | BODY MASS INDEX: 43.49 KG/M2 | RESPIRATION RATE: 17 BRPM | HEIGHT: 65 IN | WEIGHT: 261 LBS

## 2023-11-28 DIAGNOSIS — M25.371 INSTABILITY OF RIGHT ANKLE JOINT: Primary | ICD-10-CM

## 2023-11-28 PROCEDURE — 99024 POSTOP FOLLOW-UP VISIT: CPT | Performed by: ORTHOPAEDIC SURGERY

## 2024-01-03 ENCOUNTER — HOSPITAL ENCOUNTER (OUTPATIENT)
Dept: PHYSICAL THERAPY | Facility: CLINIC | Age: 49
Setting detail: THERAPIES SERIES
Discharge: HOME | End: 2024-01-03
Payer: COMMERCIAL

## 2024-01-03 PROCEDURE — 97161 PT EVAL LOW COMPLEX 20 MIN: CPT

## 2024-01-03 PROCEDURE — 97110 THERAPEUTIC EXERCISES: CPT

## 2024-01-03 NOTE — CONSULTS
[] [facility]  Outpatient Rehabilitation &  Therapy  [address]  [phone]  F: [phone] [] [facility]  Outpatient Rehabilitation &  Therapy  [address]  P: [phone]  F: [phone] [] [facility]  Outpatient Rehabilitation &  Therapy  [address]  P: [phone]  F: [phone] [x] [facility]  Outpatient Rehabilitation &  Therapy  [address]  P: [phone]  F: [phone] [] [facility]  Outpatient Rehabilitation &  Therapy  [address]   P: [phone]  F: [phone]  [] [facility]  Outpatient Rehabilitation &  Therapy  [address]   P: [phone]  F: [phone] [] [facility]  Outpatient Rehabilitation &  Therapy  [address]  P: [phone]  F: [phone] [] [facility]  Outpatient Rehabilitation &  Therapy  [address]  P: [phone]  F: [phone] [] [facility]  Outpatient Rehabilitation &  Therapy  [address]  P: [phone]  F: [phone]  [] [facility] Outpatient Rehabilitation &  Therapy  [address]  P: [phone]  F: [phone]     Physical Therapy Lower Extremity Evaluation    Date:  1/3/2024  Patient: Ginny VELA Enedinaarchana \" Cha\"  : 1975  MRN: [id number]  Physician: Dr Justen Pollard   Insurance: [facility] [place] (auth after eval)  Medical Diagnosis:   Preoperative Diagnosis:   Right ankle recurrent instability status post lateral ankle ligamentous stabilization (on 3/18/2022)  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     Postoperative Diagnosis:   Same      Procedures Performed:  (2023)  Right ankle lateral

## 2024-01-05 ENCOUNTER — HOSPITAL ENCOUNTER (OUTPATIENT)
Dept: PHYSICAL THERAPY | Facility: CLINIC | Age: 49
Setting detail: THERAPIES SERIES
Discharge: HOME | End: 2024-01-05
Payer: COMMERCIAL

## 2024-01-05 PROCEDURE — 97110 THERAPEUTIC EXERCISES: CPT

## 2024-01-05 NOTE — FLOWSHEET NOTE
[] [facility]  Outpatient Rehabilitation &  Therapy  [address]  [phone]  F: [phone] [] [facility]  Outpatient Rehabilitation &  Therapy  [address]  P: [phone]  F: [phone] [] [facility]  Outpatient Rehabilitation &  Therapy  [address]  P: [phone]  F: [phone] [x] [facility]  Outpatient Rehabilitation &  Therapy  [address]  P: [phone]  F: [phone] [] [facility]  Outpatient Rehabilitation &  Therapy  [address]   P: [phone]  F: [phone]  [] [facility]  Outpatient Rehabilitation &  Therapy  [address]   P: [phone]  F: [phone] [] [facility]  Outpatient Rehabilitation &  Therapy  [address]  P: [phone]  F: [phone] [] [facility]  Outpatient Rehabilitation &  Therapy  [address]  P: [phone]  F: [phone] [] [facility]  Outpatient Rehabilitation &  Therapy  [address]  P: [phone]  F: [phone]      Physical Therapy Daily Treatment Note    Date:  2024  Patient Name:  Ginny Hernandez    :  1975  MRN: [id number]  Physician:Dr Justen Pollard                              Insurance: [facility] (auth after eval)  Medical Diagnosis:   Preoperative Diagnosis:   Right ankle recurrent instability status post lateral ankle ligamentous stabilization (on 3/18/2022)  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     Postoperative Diagnosis:   Same      Procedures Performed:  (2023)  Right ankle lateral ligamentous allograft reconstruction  Right tibiotalar joint arthroplasty (debridement of ankle joint

## 2024-01-09 ENCOUNTER — HOSPITAL ENCOUNTER (OUTPATIENT)
Dept: PHYSICAL THERAPY | Facility: CLINIC | Age: 49
Setting detail: THERAPIES SERIES
Discharge: HOME | End: 2024-01-09
Payer: COMMERCIAL

## 2024-01-09 NOTE — FLOWSHEET NOTE
[] [facility]  Outpatient Rehabilitation &  Therapy  [address]    [phone]  F: [phone]   [] [facility]  Outpatient Rehabilitation &  Therapy  [address]  P: [phone]  F: [phone]  [] [facility]  Outpatient Rehabilitation &  Therapy  [address]  P: [phone]  F: [phone] [x] [facility]  Outpatient Rehabilitation &  Therapy  [address]  P: [phone]  F: [phone]  [] [facility]  Outpatient Rehabilitation &  Therapy  [address]   P: [phone]  F: [phone]   [] [facility]   Outpatient Rehabilitation & Therapy  [address]  P: [phone]   F: [phone]     Physical Therapy Cancel/No Show note    Date: 2024  Patient: Ginny Hernandez  : 1975  MRN: [id number]    Cancels/No Shows to date:     For today's appointment patient:    [x]  Cancelled    [] Rescheduled appointment    [] No-show     Reason given by patient:    []  Patient ill    []  Conflicting appointment    [x] No transportation      [] Conflict with work    [] No reason given    [x] Weather related    [] COVID-19    [x] Other:      Comments:  Pt stated her daughter was ill and she is her ride. Also stated that her daughter isn't comfortable driving in inclement weather. Next appt conf.      [x] Next appointment was confirmed    Electronically signed by: Mireya Fish

## 2024-01-10 ENCOUNTER — HOSPITAL ENCOUNTER (OUTPATIENT)
Dept: PHYSICAL THERAPY | Facility: CLINIC | Age: 49
Setting detail: THERAPIES SERIES
Discharge: HOME | End: 2024-01-10
Payer: COMMERCIAL

## 2024-01-10 PROCEDURE — 97110 THERAPEUTIC EXERCISES: CPT

## 2024-01-10 NOTE — FLOWSHEET NOTE
[] [facility]  Outpatient Rehabilitation &  Therapy  [address]  [phone]  F: [phone] [] [facility]  Outpatient Rehabilitation &  Therapy  [address]  P: [phone]  F: [phone] [] [facility]  Outpatient Rehabilitation &  Therapy  [address]  P: [phone]  F: [phone] [x] [facility]  Outpatient Rehabilitation &  Therapy  [address]  P: [phone]  F: [phone] [] [facility]  Outpatient Rehabilitation &  Therapy  [address]   P: [phone]  F: [phone]  [] [facility]  Outpatient Rehabilitation &  Therapy  [address]   P: [phone]  F: [phone] [] [facility]  Outpatient Rehabilitation &  Therapy  [address]  P: [phone]  F: [phone] [] [facility]  Outpatient Rehabilitation &  Therapy  [address]  P: [phone]  F: [phone] [] [facility]  Outpatient Rehabilitation &  Therapy  [address]  P: [phone]  F: [phone]      Physical Therapy Daily Treatment Note    Date:  1/10/2024  Patient Name:  Ginny Hernandez    :  1975  MRN: [id number]  Physician:Dr Justen Pollard                              Insurance: [facility] (auth after eval)  Medical Diagnosis:   Preoperative Diagnosis:   Right ankle recurrent instability status post lateral ankle ligamentous stabilization (on 3/18/2022)  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     Postoperative Diagnosis:   Same      Procedures Performed:  (2023)  Right ankle lateral ligamentous allograft reconstruction  Right tibiotalar joint arthroplasty (debridement of ankle joint

## 2024-01-11 ENCOUNTER — TELEPHONE (OUTPATIENT)
Dept: ORTHOPEDIC SURGERY | Age: 49
End: 2024-01-11

## 2024-01-11 NOTE — TELEPHONE ENCOUNTER
Dr. Pollard,     I spoke with patient. She states that she is having a lot of burning and pain in her foot/ankle. She hasn't been sleeping but 3-4 hours a night due to this. She says that she has been going to PT, and just feels like its worsening. She said it's just the same ongoing issue she has been having since this all started. She wanted to know if you had any recommendations to help her pain? I told her I would speak with you and let her know.

## 2024-01-11 NOTE — TELEPHONE ENCOUNTER
Spoke with Dr. Pollard. He stated that we need to give this more time in hopes that this will resolve itself. Patient needs to continue PT and resume all recommendations per Dr. Pollard. He also noticed that she was re-scheduled to 2/12, and he needs to see her before then because that is past her 12 weeks.     I spoke with patient and relayed message. I also re-scheduled her to 1/30 @ 11:15 am with Dr. Pollard for her 12 week post op. She had no further questions at this time.

## 2024-01-11 NOTE — TELEPHONE ENCOUNTER
Dr. Pollard patient, she would like to talk to [facility] regarding her pain.  Dos 11/8/23 R ankle.  Call back [phone].

## 2024-01-12 ENCOUNTER — HOSPITAL ENCOUNTER (OUTPATIENT)
Dept: PHYSICAL THERAPY | Facility: CLINIC | Age: 49
Setting detail: THERAPIES SERIES
Discharge: HOME | End: 2024-01-12
Payer: COMMERCIAL

## 2024-01-12 PROCEDURE — 97110 THERAPEUTIC EXERCISES: CPT

## 2024-01-12 NOTE — FLOWSHEET NOTE
reps  - Long Sitting Calf Stretch with Strap  - 1 x daily - 7 x weekly - 3 sets - 30 hold  - Seated Heel Raise  - 1 x daily - 7 x weekly - 3 sets - 10 reps  - Seated Toe Raise  - 1 x daily - 7 x weekly - 3 sets - 10 reps  - Hip Flexor Stretch at Edge of Bed  - 1 x daily - 7 x weekly - 3 sets - 30 sec hold  Access Code: [email or web address]  URL: [email or web address]  Date: 01/10/2024  Prepared by: Carmen Hartley    Exercises  - Supine Straight Leg Raises  - 1 x daily - 7 x weekly - 3 sets - 10 reps  - Sidelying Hip Abduction  - 1 x daily - 7 x weekly - 3 sets - 10 reps  - Prone Hip Extension  - 1 x daily - 7 x weekly - 3 sets - 10 reps  - Prone Hip Extension with Bent Knee  - 1 x daily - 7 x weekly - 3 sets - 10 reps  - Sidelying Hip Adduction  - 1 x daily - 7 x weekly - 3 sets - 10 reps    Plan: [x] Continue current frequency toward long and short term goals.    [x] Specific Instructions for subsequent treatments:   Continue per above        Time In: 10:55       Time Out:  11:45  Electronically signed by:  Luis Solis PTA

## 2024-01-17 ENCOUNTER — HOSPITAL ENCOUNTER (OUTPATIENT)
Dept: PHYSICAL THERAPY | Facility: CLINIC | Age: 49
Setting detail: THERAPIES SERIES
Discharge: HOME | End: 2024-01-17
Payer: COMMERCIAL

## 2024-01-17 PROCEDURE — 97110 THERAPEUTIC EXERCISES: CPT

## 2024-01-17 NOTE — FLOWSHEET NOTE
[] [facility]  Outpatient Rehabilitation &  Therapy  [address]  [phone]  F: [phone] [] [facility]  Outpatient Rehabilitation &  Therapy  [address]  P: [phone]  F: [phone] [] [facility]  Outpatient Rehabilitation &  Therapy  [address]  P: [phone]  F: [phone] [x] [facility]  Outpatient Rehabilitation &  Therapy  [address]  P: [phone]  F: [phone] [] [facility]  Outpatient Rehabilitation &  Therapy  [address]   P: [phone]  F: [phone]  [] [facility]  Outpatient Rehabilitation &  Therapy  [address]   P: [phone]  F: [phone] [] [facility]  Outpatient Rehabilitation &  Therapy  [address]  P: [phone]  F: [phone] [] [facility]  Outpatient Rehabilitation &  Therapy  [address]  P: [phone]  F: [phone] [] [facility]  Outpatient Rehabilitation &  Therapy  [address]  P: [phone]  F: [phone]      Physical Therapy Daily Treatment Note    Date:  2024  Patient Name:  Ginny Hernandez    :  1975  MRN: [id number]  Physician:Dr Justen Pollard                              Insurance: [facility] (auth after eval)  Medical Diagnosis:   Preoperative Diagnosis:   Right ankle recurrent instability status post lateral ankle ligamentous stabilization (on 3/18/2022)  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     Postoperative Diagnosis:   Same      Procedures Performed:  (2023)  Right ankle lateral ligamentous allograft reconstruction  Right tibiotalar joint arthroplasty (debridement of ankle joint

## 2024-01-18 NOTE — FLOWSHEET NOTE
[] [facility]  Outpatient Rehabilitation &  Therapy  [address]  [phone]  F: [phone] [] [facility]  Outpatient Rehabilitation &  Therapy  [address]  P: [phone]  F: [phone] [] [facility]  Outpatient Rehabilitation &  Therapy  [address]  P: [phone]  F: [phone] [x] [facility]  Outpatient Rehabilitation &  Therapy  [address]  P: [phone]  F: [phone] [] [facility]  Outpatient Rehabilitation &  Therapy  [address]   P: [phone]  F: [phone]  [] [facility]  Outpatient Rehabilitation &  Therapy  [address]   P: [phone]  F: [phone] [] [facility]  Outpatient Rehabilitation &  Therapy  [address]  P: [phone]  F: [phone] [] [facility]  Outpatient Rehabilitation &  Therapy  [address]  P: [phone]  F: [phone] [] [facility]  Outpatient Rehabilitation &  Therapy  [address]  P: [phone]  F: [phone]  [] [facility] Outpatient Rehabilitation &  Therapy  [address]  P: [phone]  F: [phone]     Therapy Cancel/No Show note    Date: 2024  Patient: Ginny VELA Mary  : 1975  MRN: [id number]    Cancels/No Shows to date: 20    For today's appointment patient:    [x]  Cancelled    [] Rescheduled appointment    [] No-show     Reason given by patient:    []  Patient ill    []  Conflicting appointment    [] No transportation      [] Conflict with work    [] No reason given    [] Weather related    [] COVID-19    [x] Other:      Comments:  Patient states she is having too much pain       [x] Next appointment was confirmed    Electronically signed by: Luis Solis PTA

## 2024-01-19 ENCOUNTER — HOSPITAL ENCOUNTER (OUTPATIENT)
Dept: PHYSICAL THERAPY | Facility: CLINIC | Age: 49
Setting detail: THERAPIES SERIES
Discharge: HOME | End: 2024-01-19
Payer: COMMERCIAL

## 2024-01-24 ENCOUNTER — HOSPITAL ENCOUNTER (OUTPATIENT)
Dept: PHYSICAL THERAPY | Facility: CLINIC | Age: 49
Setting detail: THERAPIES SERIES
Discharge: HOME | End: 2024-01-24
Payer: COMMERCIAL

## 2024-01-24 PROCEDURE — 97110 THERAPEUTIC EXERCISES: CPT

## 2024-01-24 NOTE — FLOWSHEET NOTE
EV. Able to add resistance to 4 way hip, clamshell and ankle exercises without  complaints of pain. Incision is still sensitive to touch distally with complaints of pain into lateral foot when touching the distal part of the incision. Pt has a band aide on the medial incision. Denies having an open wound at the incision but just covered as a precaution because it is irritated and looks like a little bubble.   [] No change.     [] Other:  [x] Patient would continue to benefit from skilled physical therapy services in order to:  Increase ankle ROM all planes and hip ext ROM as well as hip and foot/ankle strength to normalize gait     STG: (to be met in 10 treatments)  ? Pain:<3/10 pain R foot and ankle to allow pt to wean from boot  ? ROM:Ankle DF 10 deg or better, 55 deg PF, IN20 EV 15, Hip extension 20 deg to allow for normal gait and ADLs  ? Strength: 5/5 hip extension and abd strength to aide in mormal LE mechanics  ? Function:Able to walk with lace up brace and shoe community distances with <3/10 pain  Patient to be independent with home exercise program as demonstrated by performance with correct form without cues.     LTG: (to be met in 20 treatments)  Pt able to perform 20 reps of single leg HR to allow for normal gait   Pt able to walk with normal gait quality in tennis shoe without pain  LEFS <25% limitation                    Patient goals:get strength and motion back so I can walk normal    Pt. Education:  [x] Yes  [] No  [] Reviewed Prior HEP/Ed  Method of Education: [x] Verbal  [x] Demo  [x] Written  Comprehension of Education:  [x] Verbalizes understanding.  [] Demonstrates understanding.  [x] Needs review.  [] Demonstrates/verbalizes HEP/Ed previously given.     Access Code: [email or web address]  URL: [email or web address]  Date: 01/17/2024  Prepared by: Carmen Hartley    Exercises  - Arch Lifting  - 1 x daily - 7 x weekly - 3 sets - 10 reps  - Seated Lesser Toes Extension  - 1 x daily - 7 x weekly - 3 sets

## 2024-01-26 ENCOUNTER — HOSPITAL ENCOUNTER (OUTPATIENT)
Dept: PHYSICAL THERAPY | Facility: CLINIC | Age: 49
Setting detail: THERAPIES SERIES
Discharge: HOME | End: 2024-01-26
Payer: COMMERCIAL

## 2024-01-26 PROCEDURE — 97110 THERAPEUTIC EXERCISES: CPT

## 2024-01-26 NOTE — FLOWSHEET NOTE
osteophytes)  Right peroneus brevis tenolysis and debridement of low-lying muscle belly  Right peroneus longus tenolysis               Rehab Codes: R26.89, M25.571  Onset date: 11/8/23               Next 's appt.: 1/30/24    Visit# / total visits: 7/20    Cancels/No Shows: 0/0    Subjective:  Patient reports that she is compliant with HEP. Continues to place bandaid over incision when wearing brace to prevent any blisters from rubbing. States that she continues to have 6/10 pain. States that she does not ice for pain relief due to ice irritating foot.  Have been working on desensitization on her own, using washcloth. Reports that when sleeping her sheets irritate her foot. States that she gets ~4 hours of sleep at night.     Pain:  [x] Yes  [] No Location: right ankle   Pain Rating: (0-10 scale) 6/10  Pain altered Tx:  [] No  [] Yes  Action:  Comments:     Objective:  Modalities: prn  Manual: prn  Precautions:NO INVERSION until1/31/2, May wean from boot into lace up brace 1/3/24  Exercises:  Exercise Reps/ Time Weight/ Level Comments   Nustep 12' L2    HIp flexor stretch NT       Seated calf stretch  R  3x30\"       Ankle pump 30   W/ QS   EV AROM 30             Leg raise 15x2 2#    Sidelying hip abd  15x2 2#    Hip adduction 15x2 2#    Hip extension 15x2 2#    Clam  15x2 lime          Isometric 4 way ankle 10x10\"ea           Seated         HR 30  15#KB     Toe raise 30       Toe yoga 20       Arch lift 20       MOBO foot rock x30 1/3 DON'T DO 2/4 until 12 weeks   Other:     Instructions for next treatment:  Continue with current program    ROM   DF 9 deg  PF 60  EV 15  IN NT due to restrictions      Treatment Charges: Mins Units   []  Modalities     [x]  Ther Exercise 55 3   []  Manual Therapy     []  Ther Activities     []  Neuro Re-ed     []  Vasocompression     [] Gait     []  Other     Total Billable time 45 3       Assessment: [x] Progressing toward goals. Increasing reps as able to increase strength, with no

## 2024-01-30 ENCOUNTER — OFFICE VISIT (OUTPATIENT)
Dept: ORTHOPEDIC SURGERY | Age: 49
End: 2024-01-30

## 2024-01-30 ENCOUNTER — TELEPHONE (OUTPATIENT)
Dept: ORTHOPEDIC SURGERY | Age: 49
End: 2024-01-30

## 2024-01-30 VITALS — BODY MASS INDEX: 43.49 KG/M2 | WEIGHT: 261 LBS | OXYGEN SATURATION: 100 % | HEIGHT: 65 IN | RESPIRATION RATE: 16 BRPM

## 2024-01-30 DIAGNOSIS — M25.371 INSTABILITY OF RIGHT ANKLE JOINT: ICD-10-CM

## 2024-01-30 DIAGNOSIS — M79.2 NERVE PAIN: ICD-10-CM

## 2024-01-30 DIAGNOSIS — M79.2 NERVE PAIN: Primary | ICD-10-CM

## 2024-01-30 DIAGNOSIS — M25.371 INSTABILITY OF RIGHT ANKLE JOINT: Primary | ICD-10-CM

## 2024-01-30 PROCEDURE — 99024 POSTOP FOLLOW-UP VISIT: CPT | Performed by: ORTHOPAEDIC SURGERY

## 2024-01-30 RX ORDER — GABAPENTIN 100 MG/1
100 CAPSULE ORAL 3 TIMES DAILY
Qty: 90 CAPSULE | Refills: 0 | Status: SHIPPED | OUTPATIENT
Start: 2024-01-30 | End: 2024-02-29

## 2024-01-30 RX ORDER — NAPROXEN 500 MG/1
500 TABLET ORAL 2 TIMES DAILY PRN
Qty: 60 TABLET | Refills: 0 | Status: SHIPPED | OUTPATIENT
Start: 2024-01-30

## 2024-01-30 NOTE — PROGRESS NOTES
[facility] ORTHOPEDICS AND SPORTS MEDICINE  [address]  [place] [address]  Dept: [phone]    Ambulatory Orthopedic Postoperative Visit     Preoperative Diagnosis:   Right ankle recurrent instability status post lateral ankle ligamentous stabilization (on 3/18/2022)  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     Postoperative Diagnosis:   Same      Procedures Performed:  (11/8/2023)  Right ankle lateral ligamentous allograft reconstruction  Right tibiotalar joint arthroplasty (debridement of ankle joint osteophytes)  Right peroneus brevis tenolysis and debridement of low-lying muscle belly  Right peroneus longus tenolysis       SUBJECTIVE:     The patient returns post op from the above stated procedure. Reports doing well overall, denies wound drainage/issues, fevers/chills/night sweats, calf swelling/pain, chest pain, shortness of breath.     At today's visit, she is ambulating in a lace up ankle brace at 100% weightbearing.  She is here today with her .  She denies any repeat/new injuries. She does report that her ankle pain has been getting worse, and she localizes her pain mainly to the anteromedial ankle extending up her distal leg, but also reports a burning pain throughout her lateral ankle/hindfoot.  She does report occasional pain with pulling blankets under her foot at night.    OBJECTIVE:   Resp 16   Ht 1.651 m (5' 5\")   Wt 118.4 kg (261 lb)   SpO2 100%   BMI 43.43 kg/m²    NAD, resting comfortably  Incisions clean/dry/intact, no erythema/dehiscence/drainage  Sensation to light touch grossly intact throughout  Warm and well perfused  Grossly neurovascularly intact distally  No signs of infection  No calf swelling/tenderness  -Painless ankle range of motion  -No

## 2024-01-30 NOTE — TELEPHONE ENCOUNTER
Dr. Pollard,     Patient would like you to extend her time off because she states she is not ready to return to work. It expires on 2/8/24 (when she is supposed to return). At today's appointment, you referred her to pain management, and you do not need to see her back for 3 months. If you would like to extend her, for how long? At this point, would you like the pain management physician to extend her time off or are you willing to extend for a few weeks and then have them take over? Please let me know what you would like me to do. Thank you.

## 2024-01-31 ENCOUNTER — HOSPITAL ENCOUNTER (OUTPATIENT)
Dept: PHYSICAL THERAPY | Facility: CLINIC | Age: 49
Setting detail: THERAPIES SERIES
Discharge: HOME | End: 2024-01-31
Payer: COMMERCIAL

## 2024-01-31 PROCEDURE — 97124 MASSAGE THERAPY: CPT

## 2024-01-31 PROCEDURE — 97110 THERAPEUTIC EXERCISES: CPT

## 2024-01-31 NOTE — TELEPHONE ENCOUNTER
I spoke with the patient. She states that she would like to be extended out until the beginning of April (1st week or so). She states that she has only done 7 sessions of PT and knows that she needs more, and she just started driving again, and she is being cautious with all of this. She is afraid if she does anything too soon it will put her back at square one. She states that if she returns to work anytime soon she will be working 40 hours a week as a  and on her feet 8 hours a day and knows that will be a lot if she trys to go back at this time.     I told patient I would call her back once I spoke with you. Please advise on how long you would like to extend time off. Thank you.

## 2024-01-31 NOTE — TELEPHONE ENCOUNTER
Spoke with patient and informed her of this. She expressed understanding and had no further questions.

## 2024-01-31 NOTE — TELEPHONE ENCOUNTER
Dr. Pollard,     When you have a moment, could you sign her note for me please, and thank you. When you have a moment, nothing urgent.

## 2024-01-31 NOTE — FLOWSHEET NOTE
[] [facility]  Outpatient Rehabilitation &  Therapy  [address]  [phone]  F: [phone] [] [facility]  Outpatient Rehabilitation &  Therapy  [address]  P: [phone]  F: [phone] [] [facility]  Outpatient Rehabilitation &  Therapy  [address]  P: [phone]  F: [phone] [x] [facility]  Outpatient Rehabilitation &  Therapy  [address]  P: [phone]  F: [phone] [] [facility]  Outpatient Rehabilitation &  Therapy  [address]   P: [phone]  F: [phone]  [] [facility]  Outpatient Rehabilitation &  Therapy  [address]   P: [phone]  F: [phone] [] [facility]  Outpatient Rehabilitation &  Therapy  [address]  P: [phone]  F: [phone] [] [facility]  Outpatient Rehabilitation &  Therapy  [address]  P: [phone]  F: [phone] [] [facility]  Outpatient Rehabilitation &  Therapy  [address]  P: [phone]  F: [phone]      Physical Therapy Daily Treatment Note    Date:  2024  Patient Name:  Ginny Hernandez    :  1975  MRN: [id number]  Physician:Dr Justen Pollard                              Insurance: BCBS :BCBS; Basim yr; 30/30vs; no auth req; hard max; not comb; 20% coins; 750/0 met ded; 3000/0 met OOP   Medical Diagnosis:   Preoperative Diagnosis:   Right ankle recurrent instability status post lateral ankle ligamentous stabilization (on 3/18/2022)  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     Postoperative Diagnosis:   Same      Procedures Performed:  (2023)  Right ankle lateral ligamentous allograft

## 2024-02-02 ENCOUNTER — HOSPITAL ENCOUNTER (OUTPATIENT)
Dept: PHYSICAL THERAPY | Facility: CLINIC | Age: 49
Setting detail: THERAPIES SERIES
Discharge: HOME | End: 2024-02-02
Payer: COMMERCIAL

## 2024-02-02 PROCEDURE — 97110 THERAPEUTIC EXERCISES: CPT

## 2024-02-02 PROCEDURE — 97124 MASSAGE THERAPY: CPT

## 2024-02-02 NOTE — FLOWSHEET NOTE
Isometric Ankle Eversion at Wall  - 1 x daily - 7 x weekly - 3 sets - 10 reps  - Isometric Ankle Inversion at Wall  - 1 x daily - 7 x weekly - 3 sets - 10 reps  - Long Sitting Isometric Ankle Plantarflexion with Ball at Wall  - 1 x daily - 7 x weekly - 3 sets - 10 reps    Plan: [x] Continue current frequency toward long and short term goals.    [x] Specific Instructions for subsequent treatments:   Continue per above        Time In: 11:00    Time Out:  11:55  Electronically signed by:  Luis Solis PTA

## 2024-02-07 ENCOUNTER — HOSPITAL ENCOUNTER (OUTPATIENT)
Dept: PHYSICAL THERAPY | Facility: CLINIC | Age: 49
Setting detail: THERAPIES SERIES
Discharge: HOME | End: 2024-02-07
Payer: COMMERCIAL

## 2024-02-07 NOTE — FLOWSHEET NOTE
[] [facility]  Outpatient Rehabilitation &  Therapy  [address]  [phone]  F: [phone] [] [facility]  Outpatient Rehabilitation &  Therapy  [address]  P: [phone]  F: [phone] [] [facility]  Outpatient Rehabilitation &  Therapy  [address]  P: [phone]  F: [phone] [x] [facility]  Outpatient Rehabilitation &  Therapy  [address]  P: [phone]  F: [phone] [] [facility]  Outpatient Rehabilitation &  Therapy  [address]   P: [phone]  F: [phone]  [] [facility]  Outpatient Rehabilitation &  Therapy  [address]   P: [phone]  F: [phone] [] [facility]  Outpatient Rehabilitation &  Therapy  [address]  P: [phone]  F: [phone] [] [facility]  Outpatient Rehabilitation &  Therapy  [address]  P: [phone]  F: [phone] [] [facility]  Outpatient Rehabilitation &  Therapy  [address]  P: [phone]  F: [phone]  [] [facility] Outpatient Rehabilitation &  Therapy  [address]  P: [phone]  F: [phone]     Therapy Cancel/No Show note    Date: 2024  Patient: Ginny VELA Enedinaarchana  : 1975  MRN: [id number]    Cancels/No Shows to date: 3/0    For today's appointment patient:    [x]  Cancelled    [] Rescheduled appointment    [] No-show     Reason given by patient:    [x]  Patient ill    []  Conflicting appointment    [] No transportation      [] Conflict with work    [] No reason given    [] Weather related    [] COVID-19    [] Other:      Comments:        [x] Next appointment was confirmed    Electronically signed by: Mireya Fish

## 2024-02-09 ENCOUNTER — HOSPITAL ENCOUNTER (OUTPATIENT)
Dept: PHYSICAL THERAPY | Facility: CLINIC | Age: 49
Setting detail: THERAPIES SERIES
Discharge: HOME | End: 2024-02-09
Payer: COMMERCIAL

## 2024-02-09 PROCEDURE — 97124 MASSAGE THERAPY: CPT

## 2024-02-09 PROCEDURE — 97110 THERAPEUTIC EXERCISES: CPT

## 2024-02-09 NOTE — FLOWSHEET NOTE
[] [facility]  Outpatient Rehabilitation &  Therapy  [address]  [phone]  F: [phone] [] [facility]  Outpatient Rehabilitation &  Therapy  [address]  P: [phone]  F: [phone] [] [facility]  Outpatient Rehabilitation &  Therapy  [address]  P: [phone]  F: [phone] [x] [facility]  Outpatient Rehabilitation &  Therapy  [address]  P: [phone]  F: [phone] [] [facility]  Outpatient Rehabilitation &  Therapy  [address]   P: [phone]  F: [phone]  [] [facility]  Outpatient Rehabilitation &  Therapy  [address]   P: [phone]  F: [phone] [] [facility]  Outpatient Rehabilitation &  Therapy  [address]  P: [phone]  F: [phone] [] [facility]  Outpatient Rehabilitation &  Therapy  [address]  P: [phone]  F: [phone] [] [facility]  Outpatient Rehabilitation &  Therapy  [address]  P: [phone]  F: [phone]      Physical Therapy Daily Treatment Note    Date:  2024  Patient Name:  Ginny Hernandez    :  1975  MRN: [id number]  Physician:Dr Justen Pollard                              Insurance: BCBS :BCBS; Basim yr; 30/30vs; no auth req; hard max; not comb; 20% coins; 750/0 met ded; 3000/0 met OOP   Medical Diagnosis:   Preoperative Diagnosis:   Right ankle recurrent instability status post lateral ankle ligamentous stabilization (on 3/18/2022)  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     Postoperative Diagnosis:   Same      Procedures Performed:  (2023)  Right ankle lateral ligamentous allograft

## 2024-02-12 ENCOUNTER — HOSPITAL ENCOUNTER (OUTPATIENT)
Dept: PHYSICAL THERAPY | Facility: CLINIC | Age: 49
Setting detail: THERAPIES SERIES
Discharge: HOME | End: 2024-02-12
Payer: COMMERCIAL

## 2024-02-12 ENCOUNTER — INITIAL CONSULT (OUTPATIENT)
Dept: PAIN MANAGEMENT | Age: 49
End: 2024-02-12
Payer: COMMERCIAL

## 2024-02-12 VITALS — WEIGHT: 261 LBS | HEIGHT: 65 IN | BODY MASS INDEX: 43.49 KG/M2

## 2024-02-12 DIAGNOSIS — M25.571 CHRONIC PAIN OF RIGHT ANKLE: Primary | ICD-10-CM

## 2024-02-12 DIAGNOSIS — G89.29 OTHER CHRONIC PAIN: ICD-10-CM

## 2024-02-12 DIAGNOSIS — G89.29 CHRONIC PAIN OF RIGHT ANKLE: Primary | ICD-10-CM

## 2024-02-12 DIAGNOSIS — M79.2 NEUROPATHIC PAIN: ICD-10-CM

## 2024-02-12 PROCEDURE — 97110 THERAPEUTIC EXERCISES: CPT

## 2024-02-12 PROCEDURE — 97124 MASSAGE THERAPY: CPT

## 2024-02-12 PROCEDURE — 99204 OFFICE O/P NEW MOD 45 MIN: CPT | Performed by: PAIN MEDICINE

## 2024-02-12 RX ORDER — PREGABALIN 50 MG/1
50 CAPSULE ORAL 2 TIMES DAILY
Qty: 60 CAPSULE | Refills: 1 | Status: SHIPPED | OUTPATIENT
Start: 2024-02-12 | End: 2024-03-13

## 2024-02-12 NOTE — FLOWSHEET NOTE
[] [facility]  Outpatient Rehabilitation &  Therapy  [address]  [phone]  F: [phone] [] [facility]  Outpatient Rehabilitation &  Therapy  [address]  P: [phone]  F: [phone] [] [facility]  Outpatient Rehabilitation &  Therapy  [address]  P: [phone]  F: [phone] [x] [facility]  Outpatient Rehabilitation &  Therapy  [address]  P: [phone]  F: [phone] [] [facility]  Outpatient Rehabilitation &  Therapy  [address]   P: [phone]  F: [phone]  [] [facility]  Outpatient Rehabilitation &  Therapy  [address]   P: [phone]  F: [phone] [] [facility]  Outpatient Rehabilitation &  Therapy  [address]  P: [phone]  F: [phone] [] [facility]  Outpatient Rehabilitation &  Therapy  [address]  P: [phone]  F: [phone] [] [facility]  Outpatient Rehabilitation &  Therapy  [address]  P: [phone]  F: [phone]      Physical Therapy Daily Treatment Note    Date:  2024  Patient Name:  Ginny Hernandez    :  1975  MRN: [id number]  Physician:Dr Justen Pollard                              Insurance: BCBS :BCBS; Basim yr; 30/30vs; no auth req; hard max; not comb; 20% coins; 750/0 met ded; 3000/0 met OOP   Medical Diagnosis:   Preoperative Diagnosis:   Right ankle recurrent instability status post lateral ankle ligamentous stabilization (on 3/18/2022)  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     Postoperative Diagnosis:   Same      Procedures Performed:  (2023)  Right ankle lateral ligamentous allograft

## 2024-02-12 NOTE — PROGRESS NOTES
HPI:     Foot Pain   The pain is present in the right ankle and right foot. This is a chronic problem. The current episode started more than 1 year ago. There has been a history of trauma. The problem occurs constantly. The problem has been unchanged. The quality of the pain is described as burning and aching. The pain is moderate. Associated symptoms include an inability to bear weight, joint swelling and tingling. The symptoms are aggravated by activity, cold, heat, contact, lying down and standing. She has tried heat, cold, movement, OTC ointments, OTC pain meds, rest, acetaminophen and NSAIDS for the symptoms.     Chronic right ankle pain has been ongoing for some time.  Had surgery in March 2022 followed by 2 surgeries in 2023.  Continues to have lingering pain.  Has seen orthopedics.  Note reviewed.  Concern for CRPS.  She has been doing physical therapy.  Reports swelling and color change in the right foot and ankle.  Also reports decreased range of motion.  Does have general sensitivity in the right foot and ankle as well.  Has been on gabapentin in the past and has just restarted it with minimal benefit.  Most recent x-ray with no acute fracture or dislocation.    Pain ranges from a 3/10 to a 8/10.    Patient denies any new neurological symptoms. No bowel or bladder incontinence, no weakness, and no falling.    Review of OARRS does not show any aberrant prescription behavior.     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

## 2024-02-12 NOTE — FLOWSHEET NOTE
[] [facility]  Outpatient Rehabilitation &  Therapy  [address]  [phone]  F: [phone] [] [facility]  Outpatient Rehabilitation &  Therapy  [address]  P: [phone]  F: [phone] [] [facility]  Outpatient Rehabilitation &  Therapy  [address]  P: [phone]  F: [phone] [x] [facility]  Outpatient Rehabilitation &  Therapy  [address]  P: [phone]  F: [phone] [] [facility]  Outpatient Rehabilitation &  Therapy  [address]   P: [phone]  F: [phone]  [] [facility]  Outpatient Rehabilitation &  Therapy  [address]   P: [phone]  F: [phone] [] [facility]  Outpatient Rehabilitation &  Therapy  [address]  P: [phone]  F: [phone] [] [facility]  Outpatient Rehabilitation &  Therapy  [address]  P: [phone]  F: [phone] [] [facility]  Outpatient Rehabilitation &  Therapy  [address]  P: [phone]  F: [phone]      Physical Therapy Daily Treatment Note    Date:  2024  Patient Name:  Ginny Hernandez    :  1975  MRN: [id number]  Physician:Dr Justen Pollard                              Insurance: BCBS :BCBS; Basim yr; 30/30vs; no auth req; hard max; not comb; 20% coins; 750/0 met ded; 3000/0 met OOP   Medical Diagnosis:   Preoperative Diagnosis:   Right ankle recurrent instability status post lateral ankle ligamentous stabilization (on 3/18/2022)  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     Postoperative Diagnosis:   Same      Procedures Performed:  (2023)  Right ankle lateral ligamentous allograft

## 2024-02-15 ENCOUNTER — HOSPITAL ENCOUNTER (OUTPATIENT)
Dept: PHYSICAL THERAPY | Facility: CLINIC | Age: 49
Setting detail: THERAPIES SERIES
Discharge: HOME | End: 2024-02-15
Payer: COMMERCIAL

## 2024-02-15 NOTE — FLOWSHEET NOTE
[] [facility]  Outpatient Rehabilitation &  Therapy  [address]  [phone]  F: [phone] [] [facility]  Outpatient Rehabilitation &  Therapy  [address]  P: [phone]  F: [phone] [] [facility]  Outpatient Rehabilitation &  Therapy  [address]  P: [phone]  F: [phone] [x] [facility]  Outpatient Rehabilitation &  Therapy  [address]  P: [phone]  F: [phone] [] [facility]  Outpatient Rehabilitation &  Therapy  [address]   P: [phone]  F: [phone]  [] [facility]  Outpatient Rehabilitation &  Therapy  [address]   P: [phone]  F: [phone] [] [facility]  Outpatient Rehabilitation &  Therapy  [address]  P: [phone]  F: [phone] [] [facility]  Outpatient Rehabilitation &  Therapy  [address]  P: [phone]  F: [phone] [] [facility]  Outpatient Rehabilitation &  Therapy  [address]  P: [phone]  F: [phone]  [] [facility] Outpatient Rehabilitation &  Therapy  [address]  P: [phone]  F: [phone]     Therapy Cancel/No Show note    Date: 2/15/2024  Patient: Ginny VELA Enedinaarchana  : 1975  MRN: [id number]    Cancels/No Shows to date:     For today's appointment patient:    [x]  Cancelled    [] Rescheduled appointment    [] No-show     Reason given by patient:    [x]  Patient ill    []  Conflicting appointment    [] No transportation      [] Conflict with work    [] No reason given    [] Weather related    [] COVID-19    [] Other:      Comments:        [x] Next appointment was confirmed    Electronically signed by: Liza Domínguez PTA

## 2024-02-20 ENCOUNTER — HOSPITAL ENCOUNTER (OUTPATIENT)
Dept: PHYSICAL THERAPY | Facility: CLINIC | Age: 49
Setting detail: THERAPIES SERIES
Discharge: HOME | End: 2024-02-20
Payer: COMMERCIAL

## 2024-02-20 PROCEDURE — 97124 MASSAGE THERAPY: CPT

## 2024-02-20 PROCEDURE — 97110 THERAPEUTIC EXERCISES: CPT

## 2024-02-20 NOTE — FLOWSHEET NOTE
[] [facility]  Outpatient Rehabilitation &  Therapy  [address]  [phone]  F: [phone] [] [facility]  Outpatient Rehabilitation &  Therapy  [address]  P: [phone]  F: [phone] [] [facility]  Outpatient Rehabilitation &  Therapy  [address]  P: [phone]  F: [phone] [x] [facility]  Outpatient Rehabilitation &  Therapy  [address]  P: [phone]  F: [phone] [] [facility]  Outpatient Rehabilitation &  Therapy  [address]   P: [phone]  F: [phone]  [] [facility]  Outpatient Rehabilitation &  Therapy  [address]   P: [phone]  F: [phone] [] [facility]  Outpatient Rehabilitation &  Therapy  [address]  P: [phone]  F: [phone] [] [facility]  Outpatient Rehabilitation &  Therapy  [address]  P: [phone]  F: [phone] [] [facility]  Outpatient Rehabilitation &  Therapy  [address]  P: [phone]  F: [phone]      Physical Therapy Daily Treatment Note    Date:  2024  Patient Name:  Ginny Hernandez    :  1975  MRN: [id number]  Physician:Dr Justen Pollard                              Insurance: BCBS :BCBS; Basim yr; 30/30vs; no auth req; hard max; not comb; 20% coins; 750/0 met ded; 3000/0 met OOP   Medical Diagnosis:   Preoperative Diagnosis:   Right ankle recurrent instability status post lateral ankle ligamentous stabilization (on 3/18/2022)  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     Postoperative Diagnosis:   Same      Procedures Performed:  (2023)  Right ankle lateral ligamentous allograft

## 2024-02-23 ENCOUNTER — HOSPITAL ENCOUNTER (OUTPATIENT)
Dept: PHYSICAL THERAPY | Facility: CLINIC | Age: 49
Setting detail: THERAPIES SERIES
Discharge: HOME | End: 2024-02-23
Payer: COMMERCIAL

## 2024-02-23 PROCEDURE — 97110 THERAPEUTIC EXERCISES: CPT

## 2024-02-23 NOTE — FLOWSHEET NOTE
goals. Session started with patient on nustep for warm up.  Patient performed exercises as listed on grid with advancements made with increased standing exercises this date. Patient with mild increased soreness with standing exercises but no c/o increased swelling. Patient challenged by walking exercises added today due to decreased balance.       [x] No change. Patient with limited progress towards goals due to continued high pain levels.      [] Other:  [x] Patient would continue to benefit from skilled physical therapy services in order to:  Increase ankle ROM all planes and hip ext ROM as well as hip and foot/ankle strength to normalize gait     STG: (to be met in 10 treatments)  ? Pain:<3/10 pain R foot and ankle to allow pt to wean from boot   Lowest pain level 4/10   ? ROM:Ankle DF 10 deg or better, 55 deg PF, IN20 EV 15, Hip extension 20 deg to allow for normal gait and ADLs  ? Strength: 5/5 hip extension and abd strength to aide in mormal LE mechanics  ? Function:Able to walk with lace up brace and shoe community distances with <3/10 pain  Pain level on average when walking 6-7/10   Patient to be independent with home exercise program as demonstrated by performance with correct form without cues.     LTG: (to be met in 20 treatments)  Pt able to perform 20 reps of single leg HR to allow for normal gait  Patient unable to complete single leg HR   Pt able to walk with normal gait quality in tennis shoe without pain  LEFS <25% limitation     Patient goals:get strength and motion back so I can walk normal    Pt. Education:  [x] Yes  [] No  [x] Reviewed Prior HEP/Ed  Method of Education: [x] Verbal  [x] Demo  [] Written  Comprehension of Education:  [x] Verbalizes understanding.  [] Demonstrates understanding.  [x] Needs review.  [] Demonstrates/verbalizes HEP/Ed previously given.     Access Code: [email or web address]  URL: [email or web address]  Date: 01/17/2024  Prepared by: Carmen Hartley    Exercises  - Arch

## 2024-02-27 ENCOUNTER — HOSPITAL ENCOUNTER (OUTPATIENT)
Dept: PHYSICAL THERAPY | Facility: CLINIC | Age: 49
Setting detail: THERAPIES SERIES
Discharge: HOME | End: 2024-02-27
Payer: COMMERCIAL

## 2024-02-27 PROCEDURE — 97110 THERAPEUTIC EXERCISES: CPT

## 2024-02-27 NOTE — FLOWSHEET NOTE
[] [facility]  Outpatient Rehabilitation &  Therapy  [address]  [phone]  F: [phone] [] [facility]  Outpatient Rehabilitation &  Therapy  [address]  P: [phone]  F: [phone] [] [facility]  Outpatient Rehabilitation &  Therapy  [address]  P: [phone]  F: [phone] [x] [facility]  Outpatient Rehabilitation &  Therapy  [address]  P: [phone]  F: [phone] [] [facility]  Outpatient Rehabilitation &  Therapy  [address]   P: [phone]  F: [phone]  [] [facility]  Outpatient Rehabilitation &  Therapy  [address]   P: [phone]  F: [phone] [] [facility]  Outpatient Rehabilitation &  Therapy  [address]  P: [phone]  F: [phone] [] [facility]  Outpatient Rehabilitation &  Therapy  [address]  P: [phone]  F: [phone] [] [facility]  Outpatient Rehabilitation &  Therapy  [address]  P: [phone]  F: [phone]      Physical Therapy Daily Treatment Note    Date:  2024  Patient Name:  Ginny Hernandez    :  1975  MRN: [id number]  Physician:Dr Justen Pollard                              Insurance: BCBS :BCBS; Basim yr; 30/30vs; no auth req; hard max; not comb; 20% coins; 750/0 met ded; 3000/0 met OOP   Medical Diagnosis:   Preoperative Diagnosis:   Right ankle recurrent instability status post lateral ankle ligamentous stabilization (on 3/18/2022)  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     Postoperative Diagnosis:   Same      Procedures Performed:  (2023)  Right ankle lateral ligamentous allograft

## 2024-03-01 ENCOUNTER — HOSPITAL ENCOUNTER (OUTPATIENT)
Dept: PHYSICAL THERAPY | Facility: CLINIC | Age: 49
Setting detail: THERAPIES SERIES
Discharge: HOME | End: 2024-03-01
Payer: COMMERCIAL

## 2024-03-01 PROCEDURE — 97016 VASOPNEUMATIC DEVICE THERAPY: CPT

## 2024-03-01 PROCEDURE — 97110 THERAPEUTIC EXERCISES: CPT

## 2024-03-01 NOTE — PROGRESS NOTES
[] [facility]  Outpatient Rehabilitation &  Therapy  [address]  [phone]  [phone] [] [facility]  Outpatient Rehabilitation &  Therapy  [address]  P: [phone]  F: [phone] [] [facility]  Outpatient Rehabilitation &  Therapy  [address]  P: [phone]  F: [phone] [x] [facility]  Outpatient Rehabilitation &  Therapy  [address]  [phone]  [phone] [] [facility]  Outpatient Rehabilitation &  Therapy  [address]   P: [phone]  F: [phone]  [] [facility]  Outpatient Rehabilitation &  Therapy  [address]  P: [phone]  F: [phone] [] [facility]  Outpatient Rehabilitation &  Therapy  [address]  P: [phone]  F: [phone] [] [facility]  Outpatient Rehabilitation &  Therapy  [address]  P: [phone]  F: [phone] [] [facility]  Outpatient Rehabilitation &  Therapy  [address]  P: [phone]  F: [phone]  [] [facility] Outpatient Rehabilitation &  Therapy  [address]  P: [phone]  F: [phone]     Physical Therapy Progress Note    Date: 3/1/2024      Patient: Ginny Hernandez  : 1975  MRN: [id number]    Physician:Dr Justen Pollard                              Insurance: BCBS :BCBS; Basim yr; 30vs; no auth req; hard max; not comb; 20% coins; 750/0 met ded; 3000/0 met OOP   Medical Diagnosis:   Preoperative Diagnosis:   Right ankle recurrent instability status post lateral ankle ligamentous stabilization (on 3/18/2022)  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     Postoperative

## 2024-03-01 NOTE — FLOWSHEET NOTE
Other:   ROM: 48 PF, 12 DF, 22 EV, 25 INV    Instructions for next treatment:  Continue with current program    Treatment Charges: Mins Units   []  Modalities     [x]  Ther Exercise 45 3   []  Manual Therapy     []  Massage     []  Neuro Re-ed     [x]  Vasocompression 15 1   [] Gait     []  Other     Total Billable time 60 4       Assessment: [] Progressing toward goals.   [] No change.     [x] Other: Initiated therapy session with nustep to provide dynamic warm up prior to MMT and ROM testing. Continued with the above stated therapeutic exercises with the focus on weight bearing stability exercises. Ended session with vasocompression as patient stated that she could tolerate the pressure and cold and she was visibly swollen. Educated patient on compression  stockings for at home use and what was safe and not safe to use and signs to discontinue stockings. Therapist also educated patient to report to the onset of her foot and ankle sweating to her pain management doctor on Monday.   [x] Patient would continue to benefit from skilled physical therapy services in order to:  Increase ankle ROM all planes and hip ext ROM as well as hip and foot/ankle strength to normalize gait     STG: (to be met in 10 treatments)  ? Pain:<3/10 pain R foot and ankle to allow pt to wean from boot   Lowest pain level 4/10   ? ROM:Ankle DF 10 deg or better, 55 deg PF, IN20 EV 15, Hip extension 20 deg to allow for normal gait and ADLs ROM updated: 48 PF, 12 DF, 22 EV, 25 INV - hip extension still limited  ? Strength: 5/5 hip extension and abd strength to aide in mormal LE mechanics 5/5 hip abduction and 4+/5 hip extension  ? Function:Able to walk with lace up brace and shoe community distances with <3/10 pain  Pain level on average when walking 8-9/10   Patient to be independent with home exercise program as demonstrated by performance with correct form without cues. ONGOING     LTG: (to be met in 20 treatments)  Pt able to perform 20

## 2024-03-04 ENCOUNTER — OFFICE VISIT (OUTPATIENT)
Dept: PAIN MANAGEMENT | Age: 49
End: 2024-03-04
Payer: COMMERCIAL

## 2024-03-04 VITALS — BODY MASS INDEX: 43.49 KG/M2 | HEIGHT: 65 IN | WEIGHT: 261 LBS

## 2024-03-04 DIAGNOSIS — M79.2 NEUROPATHIC PAIN: ICD-10-CM

## 2024-03-04 DIAGNOSIS — M25.571 CHRONIC PAIN OF RIGHT ANKLE: Primary | ICD-10-CM

## 2024-03-04 DIAGNOSIS — G89.29 CHRONIC PAIN OF RIGHT ANKLE: Primary | ICD-10-CM

## 2024-03-04 DIAGNOSIS — G89.29 OTHER CHRONIC PAIN: ICD-10-CM

## 2024-03-04 PROCEDURE — 99214 OFFICE O/P EST MOD 30 MIN: CPT | Performed by: PAIN MEDICINE

## 2024-03-04 RX ORDER — PREGABALIN 100 MG/1
100 CAPSULE ORAL 2 TIMES DAILY
Qty: 60 CAPSULE | Refills: 1 | Status: SHIPPED | OUTPATIENT
Start: 2024-03-04 | End: 2024-04-03

## 2024-03-04 NOTE — PROGRESS NOTES
SURGERY Left     to relocate the second ureter    LITHOTRIPSY      WISDOM TOOTH EXTRACTION         No Known Allergies      Current Outpatient Medications:     pregabalin (LYRICA) 100 MG capsule, Take 1 capsule by mouth 2 times daily for 30 days. Max Daily Amount: 200 mg, Disp: 60 capsule, Rfl: 1    naproxen (EC NAPROSYN) 500 MG EC tablet, Take 1 tablet by mouth 2 times daily as needed for Pain, Disp: 60 tablet, Rfl: 0    Handicap Placard MISC, by Does not apply route 2022-10/, Disp: 1 each, Rfl: 0    Handicap Placard MISC, by Does not apply route 3/10/2022-2022 (Patient not taking: Reported on 3/18/2022), Disp: 1 each, Rfl: 0    History reviewed. No pertinent family history.    Social History     Socioeconomic History    Marital status:      Spouse name: Not on file    Number of children: Not on file    Years of education: Not on file    Highest education level: Not on file   Occupational History    Not on file   Tobacco Use    Smoking status: Former     Current packs/day: 0.00     Types: Cigarettes     Quit date: 3/4/2022     Years since quittin.0    Smokeless tobacco: Never   Vaping Use    Vaping Use: Some days   Substance and Sexual Activity    Alcohol use: Yes     Alcohol/week: 10.0 standard drinks of alcohol     Types: 10 Cans of beer per week    Drug use: Never    Sexual activity: Not on file   Other Topics Concern    Not on file   Social History Narrative    Not on file     Social Determinants of Health     Financial Resource Strain: Not on file   Food Insecurity: Not on file   Transportation Needs: Not on file   Physical Activity: Not on file   Stress: Not on file   Social Connections: Not on file   Intimate Partner Violence: Not on file   Housing Stability: Not on file       Review of Systems:  Review of Systems   Musculoskeletal:  Positive for stiffness.   Neurological:  Positive for tingling.       Physical Exam:    Physical Exam    Constitutional:       Appearance: Normal

## 2024-03-05 ENCOUNTER — HOSPITAL ENCOUNTER (OUTPATIENT)
Dept: PHYSICAL THERAPY | Facility: CLINIC | Age: 49
Setting detail: THERAPIES SERIES
Discharge: HOME | End: 2024-03-05
Payer: COMMERCIAL

## 2024-03-05 PROCEDURE — 97016 VASOPNEUMATIC DEVICE THERAPY: CPT

## 2024-03-05 PROCEDURE — 97110 THERAPEUTIC EXERCISES: CPT

## 2024-03-05 NOTE — FLOWSHEET NOTE
[] [facility]  Outpatient Rehabilitation &  Therapy  [address]  [phone]  F: [phone] [] [facility]  Outpatient Rehabilitation &  Therapy  [address]  P: [phone]  F: [phone] [] [facility]  Outpatient Rehabilitation &  Therapy  [address]  P: [phone]  F: [phone] [x] [facility]  Outpatient Rehabilitation &  Therapy  [address]  P: [phone]  F: [phone] [] [facility]  Outpatient Rehabilitation &  Therapy  [address]   P: [phone]  F: [phone]  [] [facility]  Outpatient Rehabilitation &  Therapy  [address]   P: [phone]  F: [phone] [] [facility]  Outpatient Rehabilitation &  Therapy  [address]  P: [phone]  F: [phone] [] [facility]  Outpatient Rehabilitation &  Therapy  [address]  P: [phone]  F: [phone] [] [facility]  Outpatient Rehabilitation &  Therapy  [address]  P: [phone]  F: [phone]      Physical Therapy Daily Treatment Note    Date:  3/5/2024  Patient Name:  Ginny Hernandez    :  1975  MRN: [id number]  Physician:Dr Justen Pollard                              Insurance: BCBS :BCBS; Basim yr; 30/30vs; no auth req; hard max; not comb; 20% coins; 750/0 met ded; 3000/0 met OOP   Medical Diagnosis:   Preoperative Diagnosis:   Right ankle recurrent instability status post lateral ankle ligamentous stabilization (on 3/18/2022)  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     Postoperative Diagnosis:   Same      Procedures Performed:  (2023)  Right ankle lateral ligamentous allograft

## 2024-03-08 ENCOUNTER — HOSPITAL ENCOUNTER (OUTPATIENT)
Dept: PHYSICAL THERAPY | Facility: CLINIC | Age: 49
Setting detail: THERAPIES SERIES
Discharge: HOME | End: 2024-03-08
Payer: COMMERCIAL

## 2024-03-08 NOTE — FLOWSHEET NOTE
[] [facility]  Outpatient Rehabilitation &  Therapy  [address]  [phone]  [phone] [] [facility]  Outpatient Rehabilitation &  Therapy  [address]  P: [phone]  F: [phone] [] [facility]  Outpatient Rehabilitation &  Therapy  [address]  P: [phone]  F: [phone] [x] [facility]  Outpatient Rehabilitation &  Therapy  [address]  [phone]  [phone] [] [facility]  Outpatient Rehabilitation &  Therapy  [address]   P: [phone]  F: [phone]  [] [facility]  Outpatient Rehabilitation &  Therapy  [address]  P: [phone]  F: [phone] [] [facility]  Outpatient Rehabilitation &  Therapy  [address]  P: [phone]  F: [phone] [] [facility]  Outpatient Rehabilitation &  Therapy  [address]  P: [phone]  F: [phone] [] [facility]  Outpatient Rehabilitation &  Therapy  [address]  P: [phone]  F: [phone]  [] [facility] Outpatient Rehabilitation &  Therapy  [address]  P: [phone]  F: [phone]     Therapy Cancel/No Show note    Date: 3/8/2024  Patient: Ginny VELA Enedinaarchana  : 1975  MRN: [id number]    Cancels/No Shows to date: 0    For today's appointment patient:    [x]  Cancelled    [] Rescheduled appointment    [] No-show     Reason given by patient:    [x]  Patient ill    []  Conflicting appointment    [] No transportation      [] Conflict with work    [] No reason given    [] Weather related    [] COVID-19    [] Other:      Comments:        [x] Next appointment was confirmed    Electronically signed by: Liza Domínguez, PTA

## 2024-03-12 ENCOUNTER — HOSPITAL ENCOUNTER (OUTPATIENT)
Dept: PHYSICAL THERAPY | Facility: CLINIC | Age: 49
Setting detail: THERAPIES SERIES
Discharge: HOME | End: 2024-03-12
Payer: COMMERCIAL

## 2024-03-12 ENCOUNTER — TELEPHONE (OUTPATIENT)
Dept: PAIN MANAGEMENT | Age: 49
End: 2024-03-12

## 2024-03-12 PROCEDURE — 97016 VASOPNEUMATIC DEVICE THERAPY: CPT

## 2024-03-12 PROCEDURE — 97110 THERAPEUTIC EXERCISES: CPT

## 2024-03-12 PROCEDURE — 97140 MANUAL THERAPY 1/> REGIONS: CPT

## 2024-03-12 NOTE — TELEPHONE ENCOUNTER
Patient called stating that since increasing lyrica she has been extremely tired and is noticing some nausea. Please advise.

## 2024-03-12 NOTE — FLOWSHEET NOTE
[] [facility]  Outpatient Rehabilitation &  Therapy  [address]  [phone]  F: [phone] [] [facility]  Outpatient Rehabilitation &  Therapy  [address]  P: [phone]  F: [phone] [] [facility]  Outpatient Rehabilitation &  Therapy  [address]  P: [phone]  F: [phone] [x] [facility]  Outpatient Rehabilitation &  Therapy  [address]  P: [phone]  F: [phone] [] [facility]  Outpatient Rehabilitation &  Therapy  [address]   P: [phone]  F: [phone]  [] [facility]  Outpatient Rehabilitation &  Therapy  [address]   P: [phone]  F: [phone] [] [facility]  Outpatient Rehabilitation &  Therapy  [address]  P: [phone]  F: [phone] [] [facility]  Outpatient Rehabilitation &  Therapy  [address]  P: [phone]  F: [phone] [] [facility]  Outpatient Rehabilitation &  Therapy  [address]  P: [phone]  F: [phone]      Physical Therapy Daily Treatment Note    Date:  3/12/2024  Patient Name:  Ginny Hernandez    :  1975  MRN: [id number]  Physician:Dr Justen Pollard                              Insurance: BCBS :BCBS; Basim yr; 30/30vs; no auth req; hard max; not comb; 20% coins; 750/0 met ded; 3000/0 met OOP   Medical Diagnosis:   Preoperative Diagnosis:   Right ankle recurrent instability status post lateral ankle ligamentous stabilization (on 3/18/2022)  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     Postoperative Diagnosis:   Same      Procedures Performed:  (2023)  Right ankle lateral ligamentous allograft

## 2024-03-15 ENCOUNTER — HOSPITAL ENCOUNTER (OUTPATIENT)
Dept: PHYSICAL THERAPY | Facility: CLINIC | Age: 49
Setting detail: THERAPIES SERIES
Discharge: HOME | End: 2024-03-15
Payer: COMMERCIAL

## 2024-03-15 NOTE — FLOWSHEET NOTE
[] [facility]  Outpatient Rehabilitation &  Therapy  [address]  [phone]  [phone] [] [facility]  Outpatient Rehabilitation &  Therapy  [address]  P: [phone]  F: [phone] [] [facility]  Outpatient Rehabilitation &  Therapy  [address]  P: [phone]  F: [phone] [x] [facility]  Outpatient Rehabilitation &  Therapy  [address]  [phone]  [phone] [] [facility]  Outpatient Rehabilitation &  Therapy  [address]   P: [phone]  F: [phone]  [] [facility]  Outpatient Rehabilitation &  Therapy  [address]  P: [phone]  F: [phone] [] [facility]  Outpatient Rehabilitation &  Therapy  [address]  P: [phone]  F: [phone] [] [facility]  Outpatient Rehabilitation &  Therapy  [address]  P: [phone]  F: [phone] [] [facility]  Outpatient Rehabilitation &  Therapy  [address]  P: [phone]  F: [phone]  [] [facility] Outpatient Rehabilitation &  Therapy  [address]  P: [phone]  F: [phone]     Therapy Cancel/No Show note    Date: 3/15/2024  Patient: Ginny VELA Enedinaarchana  : 1975  MRN: [id number]    Cancels/No Shows to date: 0    For today's appointment patient:    [x]  Cancelled    [] Rescheduled appointment    [] No-show     Reason given by patient:    []  Patient ill    []  Conflicting appointment    [] No transportation      [] Conflict with work    [] No reason given    [] Weather related    [] COVID-19    [x] Other:      Comments:  patient stated she had to take care of  today      [x] Next appointment was confirmed    Electronically signed by: Olivia Jennings PT

## 2024-03-19 ENCOUNTER — HOSPITAL ENCOUNTER (OUTPATIENT)
Dept: PHYSICAL THERAPY | Facility: CLINIC | Age: 49
Setting detail: THERAPIES SERIES
Discharge: HOME | End: 2024-03-19
Payer: COMMERCIAL

## 2024-03-19 PROCEDURE — 97016 VASOPNEUMATIC DEVICE THERAPY: CPT

## 2024-03-19 PROCEDURE — 97110 THERAPEUTIC EXERCISES: CPT

## 2024-03-19 NOTE — FLOWSHEET NOTE
[] [facility]  Outpatient Rehabilitation &  Therapy  [address]  [phone]  F: [phone] [] [facility]  Outpatient Rehabilitation &  Therapy  [address]   [address]  P: [phone]  F: [phone] [] [facility]  Outpatient Rehabilitation &  Therapy  [address]  P: [phone]  F: [phone] [x] [facility]  Outpatient Rehabilitation &  Therapy  [address]  P: [phone]  F: [phone] [] [facility]  Outpatient Rehabilitation &  Therapy  [address]   P: [phone]  F: [phone]  [] [facility]  Outpatient Rehabilitation &  Therapy  [address]   P: [phone]  F: [phone] [] [facility]  Outpatient Rehabilitation &  Therapy  [address]  P: [phone]  F: [phone] [] [facility]  Outpatient Rehabilitation &  Therapy  [address]  P: [phone]  F: [phone] [] [facility]  Outpatient Rehabilitation &  Therapy  [address]  P: [phone]  F: [phone]      Physical Therapy Daily Treatment Note    Date:  3/19/2024  Patient Name:  Ginny Hernandez    :  1975  MRN: [id number]  Physician:Dr Justen Pollard                              Insurance: BCBS :BCBS; Basim yr; 30/30vs; no auth req; hard max; not comb; 20% coins; 750/0 met ded; 3000/0 met OOP   Medical Diagnosis:   Preoperative Diagnosis:   Right ankle recurrent instability status post lateral ankle ligamentous stabilization (on 3/18/2022)  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     Postoperative Diagnosis:   Same      Procedures Performed:  (2023)  Right ankle lateral ligamentous allograft

## 2024-03-21 ENCOUNTER — TELEPHONE (OUTPATIENT)
Dept: ORTHOPEDIC SURGERY | Age: 49
End: 2024-03-21

## 2024-03-21 NOTE — TELEPHONE ENCOUNTER
Spoke with patient. Stated that I spoke with Dr. Pollard, and he would like her to wait to go over the results with Dr. Ellis to see if the results from the EMG show anything, and if it could be nerve or mechanical related.     I relayed this to the patient and she understood and had no further questions at this time.

## 2024-03-21 NOTE — TELEPHONE ENCOUNTER
Pt states she underwent an EMG at [facility] 3/20/24 ordered by Dr. Ellis.  This morning she began experiencing shooting pain in the R foot and ankle causing it to feel like it will lock up.  Pt has an appt on Monday with Dr. Ellis and was told he would review the EMG results with her at that time.  Pt is scheduled with Dr. Pollard 4/30/24.  Please call pt with recommendations/advice.

## 2024-03-22 ENCOUNTER — HOSPITAL ENCOUNTER (OUTPATIENT)
Dept: PHYSICAL THERAPY | Facility: CLINIC | Age: 49
Setting detail: THERAPIES SERIES
End: 2024-03-22
Payer: COMMERCIAL

## 2024-03-25 ENCOUNTER — OFFICE VISIT (OUTPATIENT)
Dept: PAIN MANAGEMENT | Age: 49
End: 2024-03-25
Payer: COMMERCIAL

## 2024-03-25 VITALS — HEIGHT: 65 IN | WEIGHT: 261 LBS | BODY MASS INDEX: 43.49 KG/M2

## 2024-03-25 DIAGNOSIS — M25.571 CHRONIC PAIN OF RIGHT ANKLE: Primary | ICD-10-CM

## 2024-03-25 DIAGNOSIS — M79.2 NEUROPATHIC PAIN: ICD-10-CM

## 2024-03-25 DIAGNOSIS — G89.29 OTHER CHRONIC PAIN: ICD-10-CM

## 2024-03-25 DIAGNOSIS — G89.29 CHRONIC PAIN OF RIGHT ANKLE: Primary | ICD-10-CM

## 2024-03-25 PROCEDURE — 99214 OFFICE O/P EST MOD 30 MIN: CPT | Performed by: PAIN MEDICINE

## 2024-03-25 RX ORDER — DULOXETIN HYDROCHLORIDE 30 MG/1
30 CAPSULE, DELAYED RELEASE ORAL DAILY
Qty: 30 CAPSULE | Refills: 1 | Status: SHIPPED | OUTPATIENT
Start: 2024-03-25

## 2024-03-25 NOTE — PROGRESS NOTES
Appearance: Normal appearance.   Pulmonary:      Effort: Pulmonary effort is normal.   Neurological:      Mental Status: He is alert.   Psychiatric:         Attention and Perception: Attention and perception normal.         Mood and Affect: Mood and affect normal.       Record/Diagnostics Review:    As above    Orders:  No orders of the defined types were placed in this encounter.    No orders of the defined types were placed in this encounter.      Assessment:  1. Chronic pain of right ankle    2. Neuropathic pain    3. Other chronic pain        Treatment Plan:  DISCUSSION: Treatment options discussed with patient and all questions answered to patient's satisfaction.  Risks, benefits, and alternatives of treatment discussed.    OARRS Review: Reviewed and acceptable for medications prescribed.  TREATMENT OPTIONS:     Discussed different treatment options including continued conservative care such as physical therapy, chiropractic care, acupuncture.  Also discussed neuromodulation in the form of spinal cord stimulation.  Also discussed surgical evaluation.    Discussed Cymbalta  Does wish to start this, will start 30 mg daily    May be candidate for Spinal Cord stimulation - she is considering this  Will need MRI of the lumbar and thoracic spine for preprocedure planning.  Will need pain psychology clearance.    Continue orthopedic follow-up      Sheriff Rhonda M.D.        I have reviewed the chief complaint and history of present illness (including ROS and PFSH) and vital documentation by my staff and I agree with their documentation and have added where applicable.

## 2024-03-26 ENCOUNTER — HOSPITAL ENCOUNTER (OUTPATIENT)
Dept: PHYSICAL THERAPY | Facility: CLINIC | Age: 49
Setting detail: THERAPIES SERIES
Discharge: HOME | End: 2024-03-26
Payer: COMMERCIAL

## 2024-03-26 PROCEDURE — 97110 THERAPEUTIC EXERCISES: CPT

## 2024-03-26 PROCEDURE — 97016 VASOPNEUMATIC DEVICE THERAPY: CPT

## 2024-03-26 NOTE — FLOWSHEET NOTE
[] [facility]  Outpatient Rehabilitation &  Therapy  [address]  [phone]  F: [phone] [] [facility]  Outpatient Rehabilitation &  Therapy  [address]  P: [phone]  F: [phone] [] [facility]  Outpatient Rehabilitation &  Therapy  [address]  P: [phone]  F: [phone] [x] [facility]  Outpatient Rehabilitation &  Therapy  [address]  P: [phone]  F: [phone] [] [facility]  Outpatient Rehabilitation &  Therapy  [address]   P: [phone]  F: [phone]  [] [facility]  Outpatient Rehabilitation &  Therapy  [address]   P: [phone]  F: [phone] [] [facility]  Outpatient Rehabilitation &  Therapy  [address]  P: [phone]  F: [phone] [] [facility]  Outpatient Rehabilitation &  Therapy  [address]  P: [phone]  F: [phone] [] [facility]  Outpatient Rehabilitation &  Therapy  [address]  P: [phone]  F: [phone]      Physical Therapy Daily Treatment Note    Date:  3/26/2024  Patient Name:  Ginny Hernandez    :  1975  MRN: [id number]  Physician:Dr Justen Pollard                              Insurance: BCBS :BCBS; Basim yr; 30/30vs; no auth req; hard max; not comb; 20% coins; 750/0 met ded; 3000/0 met OOP   Medical Diagnosis:   Preoperative Diagnosis:   Right ankle recurrent instability status post lateral ankle ligamentous stabilization (on 3/18/2022)  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     Postoperative Diagnosis:   Same      Procedures Performed:  (2023)  Right ankle lateral ligamentous allograft

## 2024-04-04 ENCOUNTER — HOSPITAL ENCOUNTER (OUTPATIENT)
Dept: PHYSICAL THERAPY | Facility: CLINIC | Age: 49
Setting detail: THERAPIES SERIES
Discharge: HOME | End: 2024-04-04
Payer: COMMERCIAL

## 2024-04-04 PROCEDURE — 97016 VASOPNEUMATIC DEVICE THERAPY: CPT

## 2024-04-04 PROCEDURE — 97110 THERAPEUTIC EXERCISES: CPT

## 2024-04-04 NOTE — THERAPY DISCHARGE
Diagnosis:   Same      Procedures Performed:  (11/8/2023)  Right ankle lateral ligamentous allograft reconstruction  Right tibiotalar joint arthroplasty (debridement of ankle joint osteophytes)  Right peroneus brevis tenolysis and debridement of low-lying muscle belly  Right peroneus longus tenolysis               Rehab Codes: R26.89, M25.571  Onset date: 11/8/23               Next 's appt.: 4/9 for Dr. Pollard  Visit# / total visits: 20/20                    Cancels/No Shows: 1/2  Date of initial visit: 1/3/2024                Date of final visit: 4/4/2024      Subjective: copied from 4/4 treatment session  Pain:  [x] Yes  [] No  Location: right ankle    Pain Rating: (0-10 scale) 5/10  Pain altered Tx:  [x] No  [] Yes  Action:     Comments: Patient reports that she has not been getting better for a while and her pain is still the same. Dicussed with patient about discharge from therapy and following up with ortho and neuro physicians prior to coming back to therapy if needed. She will be seeing Atul for ortho appointment next Tuesday as a follow up and she reports that she hopes that she gets work release extension forms as she feels that her pain is so great that she cannot work on her ankle.        Objective:  Test Measurements:  R ankle ROM:   45 PF, 16 DF, 22 EV, 32 INV - hip extension still limited    Strength:  4+/5 hip abduction- decreased hip abduction strength from last progress note  4+/5 hip extension   patient unable to perform one single leg heel raise due to pain    Function:  LEFS - 52 limitation    Assessment:  STG: (to be met in 10 treatments)  ? Pain:<3/10 pain R foot and ankle to allow pt to wean from boot  - GOAL NOT MET - Lowest pain level 3/10 when not doing any movement - patient reports she is waking up every 1.5 hours at night from her pain  ? ROM:Ankle DF 10 deg or better, 55 deg PF, IN20 EV 15, Hip extension 20 deg to allow for normal gait and ADLs - GOAL MOSTLY MET - ROM updated: 45

## 2024-04-04 NOTE — FLOWSHEET NOTE
[] [facility]  Outpatient Rehabilitation &  Therapy  [address]  [phone]  F: [phone] [] [facility]  Outpatient Rehabilitation &  Therapy  [address]  P: [phone]  F: [phone] [] [facility]  Outpatient Rehabilitation &  Therapy  [address]  P: [phone]  F: [phone] [x] [facility]  Outpatient Rehabilitation &  Therapy  [address]  P: [phone]  F: [phone] [] [facility]  Outpatient Rehabilitation &  Therapy  [address]   P: [phone]  F: [phone]  [] [facility]  Outpatient Rehabilitation &  Therapy  [address]   P: [phone]  F: [phone] [] [facility]  Outpatient Rehabilitation &  Therapy  [address]  [address]  P: [phone]  F: [phone] [] [facility]  Outpatient Rehabilitation &  Therapy  [address]  P: [phone]  F: [phone] [] [facility]  Outpatient Rehabilitation &  Therapy  [address]  P: [phone]  F: [phone]      Physical Therapy Daily Treatment Note    Date:  2024  Patient Name:  Ginny Hernandez    :  1975  MRN: [id number]  Physician:Dr Justen Pollard                              Insurance: BCBS :BCBS; Basim yr; 30/30vs; no auth req; hard max; not comb; 20% coins; 750/0 met ded; 3000/0 met OOP   Medical Diagnosis:   Preoperative Diagnosis:   Right ankle recurrent instability status post lateral ankle ligamentous stabilization (on 3/18/2022)  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     Postoperative Diagnosis:   Same      Procedures Performed:  (2023)  Right ankle lateral ligamentous allograft

## 2024-04-09 ENCOUNTER — OFFICE VISIT (OUTPATIENT)
Dept: ORTHOPEDIC SURGERY | Age: 49
End: 2024-04-09
Payer: COMMERCIAL

## 2024-04-09 VITALS — HEIGHT: 65 IN | BODY MASS INDEX: 43.49 KG/M2 | RESPIRATION RATE: 15 BRPM | OXYGEN SATURATION: 100 % | WEIGHT: 261 LBS

## 2024-04-09 DIAGNOSIS — M19.071 OSTEOARTHRITIS OF RIGHT ANKLE OR FOOT: ICD-10-CM

## 2024-04-09 DIAGNOSIS — M95.8 OSTEOCHONDRAL DEFECT OF ANKLE: ICD-10-CM

## 2024-04-09 DIAGNOSIS — G89.29 CHRONIC PAIN OF RIGHT ANKLE: ICD-10-CM

## 2024-04-09 DIAGNOSIS — M25.571 CHRONIC PAIN OF RIGHT ANKLE: ICD-10-CM

## 2024-04-09 DIAGNOSIS — M25.371 INSTABILITY OF RIGHT ANKLE JOINT: Primary | ICD-10-CM

## 2024-04-09 DIAGNOSIS — M79.2 NERVE PAIN: Primary | ICD-10-CM

## 2024-04-09 PROCEDURE — 99213 OFFICE O/P EST LOW 20 MIN: CPT | Performed by: ORTHOPAEDIC SURGERY

## 2024-04-09 NOTE — PROGRESS NOTES
[facility] ORTHOPEDICS AND SPORTS MEDICINE  [address]  [place] [id number]  Dept: [phone]    Ambulatory Orthopedic Follow Up Visit    Preoperative Diagnosis:   Right ankle recurrent instability status post lateral ankle ligamentous stabilization (on 3/18/2022)  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     Postoperative Diagnosis:   Same      Procedures Performed:  (11/8/2023)  Right ankle lateral ligamentous allograft reconstruction  Right tibiotalar joint arthroplasty (debridement of ankle joint osteophytes)  Right peroneus brevis tenolysis and debridement of low-lying muscle belly  Right peroneus longus tenolysis       CHIEF COMPLAINT:    Chief Complaint   Patient presents with    Foot Pain     Right          HISTORY OF PRESENT ILLNESS:       The patient is a 48 y.o. female who is being seen for evaluation of the above. Since being seen last, the patient is doing worse in terms of pain, but reports that she has increased her activity level. At today's visit, she is using no brace/assistive device.     History is obtained today from:   [x]  the patient     []  EMR     [x]  one family member/friend    []  multiple family members/friends    [x]  other: I called Dr. Ellis we spoke with the patient's case and treatment options; I called Dr. Arnav Padilla of podiatry and we discussed the patient's case      She is here today with her  to discuss an EMG/NCV ordered by Dr. Ellis of pain management.  At today's visit, she localizes her pain to the right lateral ankle/hindfoot and medial ankle/hindfoot, diffusely, as well as pain radiating into her toes.  She denies any new injury or a sense of her ankle giving out.  She does report that her pain feels like \"nerve pain\" and that it does not

## 2024-04-26 ENCOUNTER — OFFICE VISIT (OUTPATIENT)
Dept: PAIN MANAGEMENT | Age: 49
End: 2024-04-26
Payer: COMMERCIAL

## 2024-04-26 DIAGNOSIS — G89.29 CHRONIC PAIN OF RIGHT ANKLE: Primary | ICD-10-CM

## 2024-04-26 DIAGNOSIS — G89.29 OTHER CHRONIC PAIN: ICD-10-CM

## 2024-04-26 DIAGNOSIS — M25.571 CHRONIC PAIN OF RIGHT ANKLE: Primary | ICD-10-CM

## 2024-04-26 DIAGNOSIS — M79.2 NEUROPATHIC PAIN: ICD-10-CM

## 2024-04-26 PROCEDURE — 99214 OFFICE O/P EST MOD 30 MIN: CPT | Performed by: PAIN MEDICINE

## 2024-04-26 NOTE — PROGRESS NOTES
HPI:     Foot Pain   The pain is present in the right foot and right ankle. This is a chronic problem. The current episode started more than 1 year ago. The problem occurs constantly. The problem has been gradually worsening. The quality of the pain is described as aching, burning, pounding and sharp. The pain is at a severity of 5/10. Associated symptoms include an inability to bear weight and a limited range of motion. The symptoms are aggravated by activity, cold, contact, lying down and standing. She has tried acetaminophen, heat, cold, OTC ointments, NSAIDS, movement and rest for the symptoms.     Multiple ankle surgeries.  EMG with neuropathy.  Could not tolerate Cymbalta.  Has tried Neurontin and Lyrica in the past.  She is going to see a podiatrist.  She is considering spinal cord stimulation.  Has seen orthopedics, nothing further surgical planned.    Pain ranges from a 4/10 to a 10/10 depending on activity.    Patient denies any new neurological symptoms. Nobowel or bladder incontinence, no weakness, and no falling.    Review of OARRS does not show any aberrant prescription behavior.     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    Anxiety     Arthritis     right foot    Depression     managed well with medication    Kidney stone        Past Surgical History:   Procedure Laterality Date    ANKLE ARTHROSCOPY Right 2022    RIGHT ANKLE ARTHROSCOPY performed by Justen Pollard MD at [facility] OR    ANKLE SURGERY Right 2022    RIGHT ANKLE LATERAL LIGAMENT  STABILIZATION  WITH GASTROC-  ARTHREX performed by Justen Pollard MD at [facility] OR    ANKLE SURGERY Right 2023    RIGHT LATERAL ANKLE LIGAMENT ALLOGRAFT  RECONSTRUCTION performed by Justen Pollard MD at [facility] OR     SECTION      x 1     FOOT SURGERY Right 2023    RIGHT FIBULA CYST EXCISION WITH BONE GRAFT performed by Justen Pollard MD at [facility] OR

## 2024-04-30 ENCOUNTER — OFFICE VISIT (OUTPATIENT)
Dept: PAIN MANAGEMENT | Age: 49
End: 2024-04-30

## 2024-04-30 VITALS — OXYGEN SATURATION: 97 % | BODY MASS INDEX: 43.49 KG/M2 | WEIGHT: 261 LBS | HEIGHT: 65 IN | HEART RATE: 102 BPM

## 2024-04-30 DIAGNOSIS — G90.521 COMPLEX REGIONAL PAIN SYNDROME TYPE 1 OF RIGHT LOWER EXTREMITY: ICD-10-CM

## 2024-04-30 DIAGNOSIS — M25.571 CHRONIC PAIN OF RIGHT ANKLE: Primary | ICD-10-CM

## 2024-04-30 DIAGNOSIS — G89.29 CHRONIC PAIN OF RIGHT ANKLE: Primary | ICD-10-CM

## 2024-04-30 DIAGNOSIS — G62.9 NEUROPATHY: ICD-10-CM

## 2024-04-30 ASSESSMENT — ENCOUNTER SYMPTOMS
SORE THROAT: 0
VOMITING: 0
SHORTNESS OF BREATH: 0
DIARRHEA: 0
CONSTIPATION: 0
GASTROINTESTINAL NEGATIVE: 1
CHEST TIGHTNESS: 0
RESPIRATORY NEGATIVE: 1
NAUSEA: 0

## 2024-04-30 NOTE — PROGRESS NOTES
light.  Pupils are equal.   Skin:  Warm and dry.  No rash or cyanosis.   Right foot exam  ROM limited  Bluish discoloration  Hyperesthesia adn allodynia    Assessment & Plan  1. Chronic pain of right ankle    2. Neuropathy    3. Complex regional pain syndrome type 1 of right lower extremity        Orders Placed This Encounter   Procedures    MT PRQ IMPLTJ NEUROSTIMULATOR ELTRD PERIPHERAL NRV      No orders of the defined types were placed in this encounter.           Electronically signed by Marco A Solis MD on 5/5/2024 at 8:25 PM

## 2024-05-15 ENCOUNTER — HOSPITAL ENCOUNTER (OUTPATIENT)
Dept: MRI IMAGING | Age: 49
Discharge: HOME | End: 2024-05-17
Attending: PAIN MEDICINE
Payer: COMMERCIAL

## 2024-05-15 DIAGNOSIS — M79.2 NEUROPATHIC PAIN: ICD-10-CM

## 2024-05-15 DIAGNOSIS — M25.571 CHRONIC PAIN OF RIGHT ANKLE: ICD-10-CM

## 2024-05-15 DIAGNOSIS — G89.29 CHRONIC PAIN OF RIGHT ANKLE: ICD-10-CM

## 2024-05-15 PROCEDURE — 72148 MRI LUMBAR SPINE W/O DYE: CPT

## 2024-05-15 PROCEDURE — 72146 MRI CHEST SPINE W/O DYE: CPT

## 2024-05-20 ENCOUNTER — OFFICE VISIT (OUTPATIENT)
Dept: PAIN MANAGEMENT | Age: 49
End: 2024-05-20
Payer: COMMERCIAL

## 2024-05-20 VITALS — HEIGHT: 65 IN | WEIGHT: 260 LBS | BODY MASS INDEX: 43.32 KG/M2

## 2024-05-20 DIAGNOSIS — G62.9 NEUROPATHY: ICD-10-CM

## 2024-05-20 DIAGNOSIS — M25.571 CHRONIC PAIN OF RIGHT ANKLE: Primary | ICD-10-CM

## 2024-05-20 DIAGNOSIS — G89.29 CHRONIC PAIN OF RIGHT ANKLE: Primary | ICD-10-CM

## 2024-05-20 DIAGNOSIS — M79.2 NEUROPATHIC PAIN: ICD-10-CM

## 2024-05-20 DIAGNOSIS — G90.521 COMPLEX REGIONAL PAIN SYNDROME TYPE 1 OF RIGHT LOWER EXTREMITY: ICD-10-CM

## 2024-05-20 PROCEDURE — 99213 OFFICE O/P EST LOW 20 MIN: CPT | Performed by: NURSE PRACTITIONER

## 2024-05-20 ASSESSMENT — ENCOUNTER SYMPTOMS
CONSTIPATION: 0
COUGH: 0
SHORTNESS OF BREATH: 0

## 2024-05-20 NOTE — H&P (VIEW-ONLY)
appointment made for 4 weeks    I have reviewed the chief complaint and history of present illness (including ROS and PFSH) and vital documentation by my staff and I agree with their documentation and have added where applicable.

## 2024-05-20 NOTE — PROGRESS NOTES
Health     Financial Resource Strain: Not on file   Food Insecurity: Not on file   Transportation Needs: Not on file   Physical Activity: Not on file   Stress: Not on file   Social Connections: Not on file   Intimate Partner Violence: Not on file   Housing Stability: Not on file       Review of Systems:  Review of Systems   Constitutional: Negative for chills and fever.   Cardiovascular:  Negative for chest pain and palpitations.   Respiratory:  Negative for cough and shortness of breath.    Musculoskeletal:  Positive for joint pain.   Gastrointestinal:  Negative for constipation.   Neurological:  Positive for numbness. Negative for disturbances in coordination and loss of balance.       Physical Exam:  Ht 1.651 m (5' 5\")   Wt 117.9 kg (260 lb)   BMI 43.27 kg/m²     Physical Exam  HENT:      Head: Normocephalic.   Pulmonary:      Effort: Pulmonary effort is normal.   Musculoskeletal:      Cervical back: Normal range of motion.      Right ankle: Tenderness present.      Right foot: Tenderness present.   Skin:     General: Skin is warm and dry.   Neurological:      Mental Status: She is alert and oriented to person, place, and time.         Record/Diagnostics Review:    /9/2024 FINDINGS:  Three weightbearing views (AP, Mortise, and Lateral) of the right ankle were obtained in the office today and reviewed, revealing no acute fracture, dislocation, or radioopaque foreign body/tumor. The ankle mortise is maintained with no widening of the clear spaces. Overall alignment is satisfactory.  Soft tissue swelling noted around the ankle.       IMPRESSION:  No acute fracture/dislocation.     Assessment:  Problem List Items Addressed This Visit       Chronic pain of right ankle - Primary    Neuropathy     Other Visit Diagnoses       Complex regional pain syndrome type 1 of right lower extremity        Neuropathic pain                   Treatment Plan:  Continue LDN 1.5 mg QD  Pt is scheduled for PNS 6/5/24  Follow up

## 2024-06-05 ENCOUNTER — HOSPITAL ENCOUNTER (OUTPATIENT)
Dept: PAIN MANAGEMENT | Facility: CLINIC | Age: 49
Discharge: HOME | End: 2024-06-05
Payer: COMMERCIAL

## 2024-06-05 VITALS
HEART RATE: 67 BPM | HEIGHT: 65 IN | SYSTOLIC BLOOD PRESSURE: 135 MMHG | WEIGHT: 260 LBS | OXYGEN SATURATION: 96 % | RESPIRATION RATE: 12 BRPM | DIASTOLIC BLOOD PRESSURE: 72 MMHG | BODY MASS INDEX: 43.32 KG/M2 | TEMPERATURE: 97.7 F

## 2024-06-05 DIAGNOSIS — G62.9 NEUROPATHY: ICD-10-CM

## 2024-06-05 DIAGNOSIS — G89.29 CHRONIC PAIN OF RIGHT ANKLE: Primary | ICD-10-CM

## 2024-06-05 DIAGNOSIS — R52 PAIN MANAGEMENT: ICD-10-CM

## 2024-06-05 DIAGNOSIS — M25.571 CHRONIC PAIN OF RIGHT ANKLE: Primary | ICD-10-CM

## 2024-06-05 PROCEDURE — 64555 IMPLANT NEUROELECTRODES: CPT | Performed by: ANESTHESIOLOGY

## 2024-06-05 PROCEDURE — 6360000002 HC RX W HCPCS: Performed by: ANESTHESIOLOGY

## 2024-06-05 PROCEDURE — 95972 ALYS CPLX SP/PN NPGT W/PRGRM: CPT | Performed by: ANESTHESIOLOGY

## 2024-06-05 PROCEDURE — 76942 ECHO GUIDE FOR BIOPSY: CPT

## 2024-06-05 PROCEDURE — 76942 ECHO GUIDE FOR BIOPSY: CPT | Performed by: ANESTHESIOLOGY

## 2024-06-05 PROCEDURE — C1778 LEAD, NEUROSTIMULATOR: HCPCS

## 2024-06-05 PROCEDURE — 99152 MOD SED SAME PHYS/QHP 5/>YRS: CPT | Performed by: ANESTHESIOLOGY

## 2024-06-05 PROCEDURE — 64555 IMPLANT NEUROELECTRODES: CPT

## 2024-06-05 PROCEDURE — 2500000003 HC RX 250 WO HCPCS: Performed by: ANESTHESIOLOGY

## 2024-06-05 RX ORDER — MIDAZOLAM HYDROCHLORIDE 2 MG/2ML
INJECTION, SOLUTION INTRAMUSCULAR; INTRAVENOUS
Status: COMPLETED | OUTPATIENT
Start: 2024-06-05 | End: 2024-06-05

## 2024-06-05 RX ORDER — FENTANYL CITRATE 50 UG/ML
INJECTION, SOLUTION INTRAMUSCULAR; INTRAVENOUS
Status: COMPLETED | OUTPATIENT
Start: 2024-06-05 | End: 2024-06-05

## 2024-06-05 RX ORDER — LIDOCAINE HYDROCHLORIDE 10 MG/ML
INJECTION, SOLUTION EPIDURAL; INFILTRATION; INTRACAUDAL; PERINEURAL
Status: COMPLETED | OUTPATIENT
Start: 2024-06-05 | End: 2024-06-05

## 2024-06-05 RX ADMIN — FENTANYL CITRATE 50 MCG: 50 INJECTION, SOLUTION INTRAMUSCULAR; INTRAVENOUS at 07:51

## 2024-06-05 RX ADMIN — LIDOCAINE HYDROCHLORIDE 5 ML: 10 INJECTION, SOLUTION EPIDURAL; INFILTRATION; INTRACAUDAL at 07:51

## 2024-06-05 RX ADMIN — MIDAZOLAM HYDROCHLORIDE 1 MG: 1 INJECTION, SOLUTION INTRAMUSCULAR; INTRAVENOUS at 07:51

## 2024-06-05 ASSESSMENT — PAIN SCALES - GENERAL: PAINLEVEL_OUTOF10: 0

## 2024-06-05 ASSESSMENT — PAIN DESCRIPTION - DESCRIPTORS: DESCRIPTORS: STABBING;THROBBING;BURNING

## 2024-06-05 ASSESSMENT — PAIN - FUNCTIONAL ASSESSMENT
PAIN_FUNCTIONAL_ASSESSMENT: 0-10
PAIN_FUNCTIONAL_ASSESSMENT: PREVENTS OR INTERFERES SOME ACTIVE ACTIVITIES AND ADLS

## 2024-06-05 NOTE — OP NOTE
Preoperative diagnosis.    1. Chronic pain of right ankle    2. Neuropathy      Postoperative diagnosis same    Procedure performed  Ultrasound-guided right SCIATIC nerve peripheral nerve stimulator lead implant from [employer]  Complex programming of the stimulator to provide adequate analgesic coverage  Blood loss none  Complications none    Operative note  Patient was seen in the preoperative area  Chart was reviewed  Procedure was discussed in detail  All questions answered  Risk and benefits were discussed with patient  Informed consent was signed    Patient was taken to the procedure room and was placed in supine position  Area over the procedure site was prepped with chlorhexidine and was draped in sterile fashion  IV sedation with Versed and fentanyl was administered  Vitals were recorded in epic    Using a high-frequency linear probe middle of the posterior thigh was a scanned    Targeted nerve was identified     Stimulating needle was then advanced with an in-plane approach using ultrasound guidance from lateral to medial direction to be placed just above the target sciatic nerve.  After the placement of the needle, this stimulating stylette was connected to the IPG and nerve was stimulated to obtain optimal paresthesia and confirm positioning  Stylet was removed    Lead was then gently passed through the needle, target nerve was stimulated with the lead and optimal paresthesia was confirmed over the target area  Once satisfactory paresthesia was obtained, needle was withdrawn over the implanted lead stabilizing the lead at its site  Once the needle was removed, the lead was then directly stimulated again to reconfirm the optimal positioning and paresthesia    Needle entry site was then closed with Dermabond  Lead was secured with sterile dressing    Multiple programs were performed including different frequencies and amplitudes setting to obtain paresthesia with different intensity    Patient

## 2024-06-05 NOTE — DISCHARGE INSTRUCTIONS
Discharge Instructions following Sedation or Anesthesia:  If you have  received  a sedative/anesthetic therefore, you should not consume any alcoholic beverages for minimum of 12 hours.  Do not sign legal documents for 24 hours.  Dizziness, drowsiness, and unsteadiness may occur.  Rest when need to.  Increase diet as tolerated.  Keep up on fluids if diet allows.      *SEE SPRINT INSTRUCTION SHEET PROVIDED BY REPRESENTATIVE      Call [facility] at [phone] if you experience:   Fever, chills or temperature over 100    Vomiting, Headache, persistent stiff neck, nausea, blurred vision   Difficulty in urinating or unable to urinate with 8 hours   Increase in weakness, numbness or loss of function   Increased redness, swelling or drainage at the injection site   Monitor for any signs of infection, such as, redness, swelling, green or yellow foul smelling drainage at injection site. Increased pain or fever.

## 2024-06-05 NOTE — INTERVAL H&P NOTE
Update History & Physical    The patient's History and Physical of May 20, 2024 was reviewed with the patient and I examined the patient. There was no change. The surgical site was confirmed by the patient and me.     Plan: The risks, benefits, expected outcome, and alternative to the recommended procedure have been discussed with the patient. Patient understands and wants to proceed with the procedure.     ASA 3  MP 3    Electronically signed by Marco A Solis MD on 6/5/2024 at 7:46 AM

## 2024-06-13 ENCOUNTER — HOSPITAL ENCOUNTER (OUTPATIENT)
Age: 49
Discharge: HOME | End: 2024-06-15
Payer: COMMERCIAL

## 2024-06-13 ENCOUNTER — HOSPITAL ENCOUNTER (OUTPATIENT)
Dept: PAIN MANAGEMENT | Age: 49
Discharge: HOME | End: 2024-06-13
Payer: COMMERCIAL

## 2024-06-13 ENCOUNTER — HOSPITAL ENCOUNTER (OUTPATIENT)
Dept: GENERAL RADIOLOGY | Age: 49
Discharge: HOME | End: 2024-06-15
Payer: COMMERCIAL

## 2024-06-13 VITALS — WEIGHT: 260 LBS | HEIGHT: 65 IN | BODY MASS INDEX: 43.32 KG/M2

## 2024-06-13 DIAGNOSIS — Z96.9 PRESENCE OF RETAINED HARDWARE: ICD-10-CM

## 2024-06-13 DIAGNOSIS — G62.9 NEUROPATHY: ICD-10-CM

## 2024-06-13 DIAGNOSIS — Z96.9 PRESENCE OF RETAINED HARDWARE: Primary | ICD-10-CM

## 2024-06-13 DIAGNOSIS — G89.29 CHRONIC PAIN OF RIGHT ANKLE: ICD-10-CM

## 2024-06-13 DIAGNOSIS — M25.371 INSTABILITY OF RIGHT ANKLE JOINT: ICD-10-CM

## 2024-06-13 DIAGNOSIS — M25.571 CHRONIC PAIN OF RIGHT ANKLE: ICD-10-CM

## 2024-06-13 PROCEDURE — 73552 X-RAY EXAM OF FEMUR 2/>: CPT

## 2024-06-13 PROCEDURE — 99213 OFFICE O/P EST LOW 20 MIN: CPT

## 2024-06-13 PROCEDURE — 99213 OFFICE O/P EST LOW 20 MIN: CPT | Performed by: ANESTHESIOLOGY

## 2024-06-13 ASSESSMENT — PAIN DESCRIPTION - ORIENTATION: ORIENTATION: LOWER

## 2024-06-13 ASSESSMENT — ENCOUNTER SYMPTOMS
VOMITING: 0
GASTROINTESTINAL NEGATIVE: 1
CONSTIPATION: 0
CHEST TIGHTNESS: 0
NAUSEA: 0
SORE THROAT: 0
RESPIRATORY NEGATIVE: 1
DIARRHEA: 0
BACK PAIN: 1
SHORTNESS OF BREATH: 0

## 2024-06-13 ASSESSMENT — PAIN DESCRIPTION - PAIN TYPE: TYPE: CHRONIC PAIN

## 2024-06-13 ASSESSMENT — PAIN DESCRIPTION - DESCRIPTORS: DESCRIPTORS: ACHING

## 2024-06-13 ASSESSMENT — PAIN DESCRIPTION - LOCATION: LOCATION: BACK

## 2024-06-13 ASSESSMENT — PAIN DESCRIPTION - FREQUENCY: FREQUENCY: CONTINUOUS

## 2024-06-13 ASSESSMENT — PAIN SCALES - GENERAL: PAINLEVEL_OUTOF10: 5

## 2024-06-13 NOTE — PROGRESS NOTES
The patient is a 48 y.o.Non- / non  female.    Chief Complaint   Patient presents with    Back Pain        Back Pain  Associated symptoms include numbness. Pertinent negatives include no chest pain, fever or weakness.       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    Anxiety     Arthritis     right foot    Depression     managed well with medication    Kidney stone         Past Surgical History:   Procedure Laterality Date    ANKLE ARTHROSCOPY Right 2022    RIGHT ANKLE ARTHROSCOPY performed by Justen Pollard MD at [facility] OR    ANKLE SURGERY Right 2022    RIGHT ANKLE LATERAL LIGAMENT  STABILIZATION  WITH GASTROC-  ARTHREX performed by Justen oPllard MD at [facility] OR    ANKLE SURGERY Right 2023    RIGHT LATERAL ANKLE LIGAMENT ALLOGRAFT  RECONSTRUCTION performed by Justen Pollard MD at [facility] OR     SECTION      x 1     FOOT SURGERY Right 2023    RIGHT FIBULA CYST EXCISION WITH BONE GRAFT performed by Justen Pollard MD at [facility] OR    HYSTERECTOMY (CERVIX STATUS UNKNOWN)      KIDNEY SURGERY Left     to relocate the second ureter    LITHOTRIPSY      WISDOM TOOTH EXTRACTION         Social History     Socioeconomic History    Marital status:      Spouse name: None    Number of children: None    Years of education: None    Highest education level: None   Tobacco Use    Smoking status: Former     Current packs/day: 0.00     Types: Cigarettes     Quit date: 3/4/2022     Years since quittin.2    Smokeless tobacco: Never   Vaping Use    Vaping Use: Some days   Substance and

## 2024-06-17 ENCOUNTER — TELEPHONE (OUTPATIENT)
Dept: PAIN MANAGEMENT | Age: 49
End: 2024-06-17

## 2024-06-17 NOTE — TELEPHONE ENCOUNTER
DEYANIRAI:  Pt called having a lot of pain regarding the PNS. Pt got xrays done got a hold of Nandini Burdne PNS rep. She said that she will contact the patient again today. Advised both pt and PNS rep that pt having severe pain to go to ED. Pt refused said didn't have the time.

## 2024-06-25 ENCOUNTER — OFFICE VISIT (OUTPATIENT)
Dept: PAIN MANAGEMENT | Age: 49
End: 2024-06-25
Payer: COMMERCIAL

## 2024-06-25 VITALS — OXYGEN SATURATION: 97 % | HEIGHT: 65 IN | BODY MASS INDEX: 43.32 KG/M2 | WEIGHT: 260 LBS | HEART RATE: 102 BPM

## 2024-06-25 DIAGNOSIS — M25.571 CHRONIC PAIN OF RIGHT ANKLE: Primary | ICD-10-CM

## 2024-06-25 DIAGNOSIS — Z96.9 PRESENCE OF RETAINED HARDWARE: ICD-10-CM

## 2024-06-25 DIAGNOSIS — G89.29 CHRONIC PAIN OF RIGHT ANKLE: Primary | ICD-10-CM

## 2024-06-25 DIAGNOSIS — G62.9 NEUROPATHY: ICD-10-CM

## 2024-06-25 DIAGNOSIS — M25.371 INSTABILITY OF RIGHT ANKLE JOINT: ICD-10-CM

## 2024-06-25 PROCEDURE — 99213 OFFICE O/P EST LOW 20 MIN: CPT | Performed by: ANESTHESIOLOGY

## 2024-06-25 ASSESSMENT — ENCOUNTER SYMPTOMS
VOMITING: 0
BACK PAIN: 0
DIARRHEA: 0
EYES NEGATIVE: 1
COUGH: 0
CONSTIPATION: 0
NAUSEA: 0
SHORTNESS OF BREATH: 0

## 2024-06-25 NOTE — TELEPHONE ENCOUNTER
Please call her today to follow up and update me if PNS is having any issue,   Bring her in if necessary

## 2024-06-25 NOTE — H&P (VIEW-ONLY)
The patient is a 48 y.o.Non- / non  female.    Chief Complaint   Patient presents with    Ankle Pain        HPI    48-year-old female with history of chronic right lower extremity pain diagnosed with ankle and foot issues and had surgeries  Failed different modalities  We did peripheral nerve stimulator implant for right sciatic nerve on     Unfortunately the peripheral nerve stimulator lead was dislodged  She do report more than 70 to 80% improvement in pain with improved activity tolerance during the.  When she was able to use the nerve stimulator    We were not able to reestablish paresthesia  Leads were pulled  Retained fragment was identified  Confirmed with x-ray    Patient have increased right lower extremity pain since removal of the peripheral nerve stimulator  She wished to proceed with reimplantation of peripheral nerve stimulator  Risk and benefit discussed with patient  Will schedule for it    Patient is here today for: discuss procedure  Pain level: 8  Character: BURNING, THROBBING  Radiating: back of calf right side  Weakness or numbness: numbness foot  Aggravating Factors: walking, standing,laying down  Alleviating Factors: rest, ice  Constant or intermitting: constant  Bladder/bowel loss: yes/bladder         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    Anxiety     Arthritis     right foot    Depression     managed well with medication    Kidney stone         Past Surgical History:   Procedure Laterality Date    ANKLE ARTHROSCOPY Right 2022    RIGHT ANKLE ARTHROSCOPY performed by Justen Pollard MD at [facility] OR    ANKLE SURGERY Right 2022    RIGHT ANKLE LATERAL LIGAMENT  STABILIZATION  WITH GASTROC-  ARTHREX performed by Justen Pollard MD at [facility] OR    ANKLE SURGERY Right 2023    RIGHT LATERAL ANKLE LIGAMENT ALLOGRAFT  RECONSTRUCTION performed by Justen Pollard MD at [facility] OR

## 2024-06-25 NOTE — PROGRESS NOTES
%.  Vital signs are normal.  No fever.    Output: Producing urine and producing stool.    HEENT: Normal HEENT exam.    Lungs:  Normal effort and normal respiratory rate.  She is not in respiratory distress.    Heart: Normal rate.    Extremities: Normal range of motion.  There is no deformity.    Neurological: Patient is alert and oriented to person, place and time.  Normal strength.  Patient has normal coordination.    Pupils:  Pupils are equal, round, and reactive to light.  Pupils are equal.   Skin:  Warm and dry.  No rash or cyanosis.   Ankle  Postsurgical scarring  Range of motion limited  Sensitivity to touch  Tenderness to palpation  Swelling visible    Assessment & Plan    1. Chronic pain of right ankle    2. Instability of right ankle joint    3. Neuropathy    4. Presence of retained hardware        Orders Placed This Encounter   Procedures    ME PRQ IMPLTJ NEUROSTIMULATOR ELTRD PERIPHERAL NRV      No orders of the defined types were placed in this encounter.           Electronically signed by Marco A Solis MD on 6/25/2024 at 11:22 AM

## 2024-07-17 ENCOUNTER — HOSPITAL ENCOUNTER (OUTPATIENT)
Dept: PAIN MANAGEMENT | Facility: CLINIC | Age: 49
Discharge: HOME | End: 2024-07-17
Payer: COMMERCIAL

## 2024-07-17 VITALS
WEIGHT: 260 LBS | TEMPERATURE: 97.1 F | HEIGHT: 65 IN | BODY MASS INDEX: 43.32 KG/M2 | HEART RATE: 68 BPM | RESPIRATION RATE: 12 BRPM | OXYGEN SATURATION: 96 % | DIASTOLIC BLOOD PRESSURE: 62 MMHG | SYSTOLIC BLOOD PRESSURE: 124 MMHG

## 2024-07-17 DIAGNOSIS — G89.29 CHRONIC PAIN OF RIGHT ANKLE: ICD-10-CM

## 2024-07-17 DIAGNOSIS — R52 PAIN MANAGEMENT: ICD-10-CM

## 2024-07-17 DIAGNOSIS — Z96.9 PRESENCE OF RETAINED HARDWARE: ICD-10-CM

## 2024-07-17 DIAGNOSIS — G62.9 NEUROPATHY: Primary | ICD-10-CM

## 2024-07-17 DIAGNOSIS — M25.571 CHRONIC PAIN OF RIGHT ANKLE: ICD-10-CM

## 2024-07-17 PROCEDURE — 63650 IMPLANT NEUROELECTRODES: CPT

## 2024-07-17 PROCEDURE — 2500000003 HC RX 250 WO HCPCS: Performed by: ANESTHESIOLOGY

## 2024-07-17 PROCEDURE — 64555 IMPLANT NEUROELECTRODES: CPT | Performed by: ANESTHESIOLOGY

## 2024-07-17 PROCEDURE — 6360000002 HC RX W HCPCS: Performed by: ANESTHESIOLOGY

## 2024-07-17 PROCEDURE — C1778 LEAD, NEUROSTIMULATOR: HCPCS

## 2024-07-17 PROCEDURE — 64555 IMPLANT NEUROELECTRODES: CPT

## 2024-07-17 PROCEDURE — 76942 ECHO GUIDE FOR BIOPSY: CPT | Performed by: ANESTHESIOLOGY

## 2024-07-17 PROCEDURE — 99152 MOD SED SAME PHYS/QHP 5/>YRS: CPT | Performed by: ANESTHESIOLOGY

## 2024-07-17 PROCEDURE — 76942 ECHO GUIDE FOR BIOPSY: CPT

## 2024-07-17 RX ORDER — MIDAZOLAM HYDROCHLORIDE 2 MG/2ML
INJECTION, SOLUTION INTRAMUSCULAR; INTRAVENOUS
Status: COMPLETED | OUTPATIENT
Start: 2024-07-17 | End: 2024-07-17

## 2024-07-17 RX ORDER — LIDOCAINE HYDROCHLORIDE 10 MG/ML
INJECTION, SOLUTION EPIDURAL; INFILTRATION; INTRACAUDAL; PERINEURAL
Status: COMPLETED | OUTPATIENT
Start: 2024-07-17 | End: 2024-07-17

## 2024-07-17 RX ORDER — FENTANYL CITRATE 50 UG/ML
INJECTION, SOLUTION INTRAMUSCULAR; INTRAVENOUS
Status: COMPLETED | OUTPATIENT
Start: 2024-07-17 | End: 2024-07-17

## 2024-07-17 RX ADMIN — FENTANYL CITRATE 50 MCG: 50 INJECTION, SOLUTION INTRAMUSCULAR; INTRAVENOUS at 16:13

## 2024-07-17 RX ADMIN — LIDOCAINE HYDROCHLORIDE 5 ML: 10 INJECTION, SOLUTION EPIDURAL; INFILTRATION; INTRACAUDAL at 16:13

## 2024-07-17 RX ADMIN — MIDAZOLAM HYDROCHLORIDE 2 MG: 1 INJECTION, SOLUTION INTRAMUSCULAR; INTRAVENOUS at 16:13

## 2024-07-17 ASSESSMENT — PAIN - FUNCTIONAL ASSESSMENT
PAIN_FUNCTIONAL_ASSESSMENT: 0-10
PAIN_FUNCTIONAL_ASSESSMENT: 0-10
PAIN_FUNCTIONAL_ASSESSMENT: PREVENTS OR INTERFERES WITH ALL ACTIVE AND SOME PASSIVE ACTIVITIES

## 2024-07-17 ASSESSMENT — PAIN DESCRIPTION - DESCRIPTORS: DESCRIPTORS: CRAMPING;SHOOTING

## 2024-07-17 NOTE — DISCHARGE INSTRUCTIONS
Discharge Instructions following Sedation or Anesthesia:  If you have  received  a sedative/anesthetic therefore, you should not consume any alcoholic beverages for minimum of 12 hours.  Do not sign legal documents for 24 hours.  Dizziness, drowsiness, and unsteadiness may occur.  Rest when need to.  Increase diet as tolerated.  Keep up on fluids if diet allows.      *SEE SPRINT INSTRUCTION SHEET PROVIDED BY REPRESENTATIVE      Call [facility] at [phone] if you experience:   Fever, chills or temperature over 100    Vomiting, Headache, persistent stiff neck, nausea, blurred vision   Difficulty in urinating or unable to urinate with 8 hours   Increase in weakness, numbness or loss of function   Increased redness, swelling or drainage at the injection site   Monitor for any signs of infection, such as, redness, swelling, green or yellow foul smelling drainage at injection site. Increased pain or fever.

## 2024-07-17 NOTE — OP NOTE
Patient tolerated the procedure well  There was no complication  She was taken to the recovery and was again educated about the use of the peripheral nerve stimulator and was discharged home in a stable condition     SEDATION NOTE:     ASA CLASSIFICATION  3  MP   CLASSIFICATION  3     Moderate intravenous conscious sedation was supervised by Dr. Solis  The patient was independently monitored by a Registered Nurse assigned to the Procedure Room  Monitoring included automated blood pressure, continuous EKG, Capnography and continuous pulse oximetry.   The detailed Conscious Record is permanently stored in the Hospital Information System.      The following is the conscious sedation record;  Start Time:  1611  End times:  1633  Duration:  22  minutes  MEDS GIVEN 2 MG VERSED AND 50 MCG FENTANYL

## 2024-07-17 NOTE — INTERVAL H&P NOTE
Update History & Physical    The patient's History and Physical of June 25, 2024 was reviewed with the patient and I examined the patient. There was no change. The surgical site was confirmed by the patient and me.     Plan: The risks, benefits, expected outcome, and alternative to the recommended procedure have been discussed with the patient. Patient understands and wants to proceed with the procedure.     ASA 3  MP 3    Electronically signed by Marco A Solis MD on 7/17/2024 at 4:03 PM

## 2024-09-10 ENCOUNTER — OFFICE VISIT (OUTPATIENT)
Dept: PAIN MANAGEMENT | Age: 49
End: 2024-09-10
Payer: COMMERCIAL

## 2024-09-10 VITALS — HEART RATE: 68 BPM | OXYGEN SATURATION: 96 % | HEIGHT: 65 IN | BODY MASS INDEX: 43.32 KG/M2 | WEIGHT: 260 LBS

## 2024-09-10 DIAGNOSIS — G62.9 NEUROPATHY: ICD-10-CM

## 2024-09-10 DIAGNOSIS — G89.29 CHRONIC PAIN OF RIGHT ANKLE: Primary | ICD-10-CM

## 2024-09-10 DIAGNOSIS — Z96.9 PRESENCE OF RETAINED HARDWARE: ICD-10-CM

## 2024-09-10 DIAGNOSIS — M25.571 CHRONIC PAIN OF RIGHT ANKLE: Primary | ICD-10-CM

## 2024-09-10 PROCEDURE — 99213 OFFICE O/P EST LOW 20 MIN: CPT | Performed by: ANESTHESIOLOGY

## 2024-09-10 ASSESSMENT — ENCOUNTER SYMPTOMS
SHORTNESS OF BREATH: 0
COUGH: 0
NAUSEA: 0
DIARRHEA: 0
VOMITING: 0
CONSTIPATION: 0

## 2024-10-23 ENCOUNTER — HOSPITAL ENCOUNTER (OUTPATIENT)
Age: 49
Setting detail: OUTPATIENT SURGERY
Discharge: HOME | End: 2024-10-23
Attending: STUDENT IN AN ORGANIZED HEALTH CARE EDUCATION/TRAINING PROGRAM | Admitting: STUDENT IN AN ORGANIZED HEALTH CARE EDUCATION/TRAINING PROGRAM
Payer: COMMERCIAL

## 2024-10-23 ENCOUNTER — ANESTHESIA EVENT (OUTPATIENT)
Dept: OPERATING ROOM | Age: 49
End: 2024-10-23
Payer: COMMERCIAL

## 2024-10-23 ENCOUNTER — ANESTHESIA (OUTPATIENT)
Dept: OPERATING ROOM | Age: 49
End: 2024-10-23
Payer: COMMERCIAL

## 2024-10-23 VITALS
SYSTOLIC BLOOD PRESSURE: 112 MMHG | WEIGHT: 263 LBS | RESPIRATION RATE: 20 BRPM | HEART RATE: 74 BPM | DIASTOLIC BLOOD PRESSURE: 71 MMHG | BODY MASS INDEX: 43.82 KG/M2 | OXYGEN SATURATION: 94 % | TEMPERATURE: 97.7 F | HEIGHT: 65 IN

## 2024-10-23 DIAGNOSIS — Z98.890 POSTOPERATIVE STATE: ICD-10-CM

## 2024-10-23 DIAGNOSIS — G89.18 POSTOPERATIVE PAIN: Primary | ICD-10-CM

## 2024-10-23 PROCEDURE — 6370000000 HC RX 637 (ALT 250 FOR IP): Performed by: ANESTHESIOLOGY

## 2024-10-23 PROCEDURE — 6360000002 HC RX W HCPCS: Performed by: STUDENT IN AN ORGANIZED HEALTH CARE EDUCATION/TRAINING PROGRAM

## 2024-10-23 PROCEDURE — 2500000003 HC RX 250 WO HCPCS: Performed by: NURSE ANESTHETIST, CERTIFIED REGISTERED

## 2024-10-23 PROCEDURE — 6360000002 HC RX W HCPCS: Performed by: NURSE ANESTHETIST, CERTIFIED REGISTERED

## 2024-10-23 PROCEDURE — 2709999900 HC NON-CHARGEABLE SUPPLY: Performed by: STUDENT IN AN ORGANIZED HEALTH CARE EDUCATION/TRAINING PROGRAM

## 2024-10-23 PROCEDURE — C9352 NEURAGEN NERVE GUIDE, PER CM: HCPCS | Performed by: STUDENT IN AN ORGANIZED HEALTH CARE EDUCATION/TRAINING PROGRAM

## 2024-10-23 PROCEDURE — 7100000011 HC PHASE II RECOVERY - ADDTL 15 MIN: Performed by: STUDENT IN AN ORGANIZED HEALTH CARE EDUCATION/TRAINING PROGRAM

## 2024-10-23 PROCEDURE — 7100000000 HC PACU RECOVERY - FIRST 15 MIN: Performed by: STUDENT IN AN ORGANIZED HEALTH CARE EDUCATION/TRAINING PROGRAM

## 2024-10-23 PROCEDURE — 2580000003 HC RX 258

## 2024-10-23 PROCEDURE — 3600000012 HC SURGERY LEVEL 2 ADDTL 15MIN: Performed by: STUDENT IN AN ORGANIZED HEALTH CARE EDUCATION/TRAINING PROGRAM

## 2024-10-23 PROCEDURE — C1776 JOINT DEVICE (IMPLANTABLE): HCPCS | Performed by: STUDENT IN AN ORGANIZED HEALTH CARE EDUCATION/TRAINING PROGRAM

## 2024-10-23 PROCEDURE — 7100000001 HC PACU RECOVERY - ADDTL 15 MIN: Performed by: STUDENT IN AN ORGANIZED HEALTH CARE EDUCATION/TRAINING PROGRAM

## 2024-10-23 PROCEDURE — 2500000003 HC RX 250 WO HCPCS: Performed by: STUDENT IN AN ORGANIZED HEALTH CARE EDUCATION/TRAINING PROGRAM

## 2024-10-23 PROCEDURE — 3700000000 HC ANESTHESIA ATTENDED CARE: Performed by: STUDENT IN AN ORGANIZED HEALTH CARE EDUCATION/TRAINING PROGRAM

## 2024-10-23 PROCEDURE — 6360000002 HC RX W HCPCS: Performed by: ANESTHESIOLOGY

## 2024-10-23 PROCEDURE — 7100000010 HC PHASE II RECOVERY - FIRST 15 MIN: Performed by: STUDENT IN AN ORGANIZED HEALTH CARE EDUCATION/TRAINING PROGRAM

## 2024-10-23 PROCEDURE — 3700000001 HC ADD 15 MINUTES (ANESTHESIA): Performed by: STUDENT IN AN ORGANIZED HEALTH CARE EDUCATION/TRAINING PROGRAM

## 2024-10-23 PROCEDURE — 3600000002 HC SURGERY LEVEL 2 BASE: Performed by: STUDENT IN AN ORGANIZED HEALTH CARE EDUCATION/TRAINING PROGRAM

## 2024-10-23 DEVICE — NEURAGEN ADVANCED NERVE GUIDE MATRIX 4MM X 3CM
Type: IMPLANTABLE DEVICE | Site: ANKLE | Status: FUNCTIONAL
Brand: NEURAGEN® 3D

## 2024-10-23 DEVICE — TENOGLIDE TENDON PROTECTOR SHEET 2 IN X 2 IN (5CM X 5CM)
Type: IMPLANTABLE DEVICE | Status: FUNCTIONAL
Brand: TENOGLIDE®

## 2024-10-23 RX ORDER — HYDROMORPHONE HYDROCHLORIDE 1 MG/ML
0.5 INJECTION, SOLUTION INTRAMUSCULAR; INTRAVENOUS; SUBCUTANEOUS EVERY 5 MIN PRN
Status: DISCONTINUED | OUTPATIENT
Start: 2024-10-23 | End: 2024-10-23 | Stop reason: HOSPADM

## 2024-10-23 RX ORDER — PROPOFOL 10 MG/ML
INJECTION, EMULSION INTRAVENOUS
Status: DISCONTINUED | OUTPATIENT
Start: 2024-10-23 | End: 2024-10-23 | Stop reason: SDUPTHER

## 2024-10-23 RX ORDER — ACETAMINOPHEN 500 MG
1000 TABLET ORAL ONCE
Status: COMPLETED | OUTPATIENT
Start: 2024-10-23 | End: 2024-10-23

## 2024-10-23 RX ORDER — KETOROLAC TROMETHAMINE 10 MG/1
10 TABLET, FILM COATED ORAL EVERY 6 HOURS PRN
Qty: 20 TABLET | Refills: 0 | Status: SHIPPED | OUTPATIENT
Start: 2024-10-23 | End: 2025-10-23

## 2024-10-23 RX ORDER — HYDROCODONE BITARTRATE AND ACETAMINOPHEN 5; 325 MG/1; MG/1
1 TABLET ORAL EVERY 6 HOURS PRN
Qty: 28 TABLET | Refills: 0 | Status: SHIPPED | OUTPATIENT
Start: 2024-10-23 | End: 2024-10-30

## 2024-10-23 RX ORDER — SODIUM CHLORIDE 0.9 % (FLUSH) 0.9 %
5-40 SYRINGE (ML) INJECTION PRN
Status: DISCONTINUED | OUTPATIENT
Start: 2024-10-23 | End: 2024-10-23 | Stop reason: HOSPADM

## 2024-10-23 RX ORDER — BUPIVACAINE HYDROCHLORIDE AND EPINEPHRINE 5; 5 MG/ML; UG/ML
INJECTION, SOLUTION EPIDURAL; INTRACAUDAL; PERINEURAL
Status: DISCONTINUED
Start: 2024-10-23 | End: 2024-10-23 | Stop reason: HOSPADM

## 2024-10-23 RX ORDER — GLYCOPYRROLATE 0.2 MG/ML
INJECTION INTRAMUSCULAR; INTRAVENOUS
Status: DISCONTINUED | OUTPATIENT
Start: 2024-10-23 | End: 2024-10-23 | Stop reason: SDUPTHER

## 2024-10-23 RX ORDER — SODIUM CHLORIDE 9 MG/ML
INJECTION, SOLUTION INTRAVENOUS PRN
Status: DISCONTINUED | OUTPATIENT
Start: 2024-10-23 | End: 2024-10-23 | Stop reason: HOSPADM

## 2024-10-23 RX ORDER — NALOXONE HYDROCHLORIDE 0.4 MG/ML
INJECTION, SOLUTION INTRAMUSCULAR; INTRAVENOUS; SUBCUTANEOUS PRN
Status: DISCONTINUED | OUTPATIENT
Start: 2024-10-23 | End: 2024-10-23 | Stop reason: HOSPADM

## 2024-10-23 RX ORDER — FENTANYL CITRATE 50 UG/ML
INJECTION, SOLUTION INTRAMUSCULAR; INTRAVENOUS
Status: DISCONTINUED | OUTPATIENT
Start: 2024-10-23 | End: 2024-10-23 | Stop reason: SDUPTHER

## 2024-10-23 RX ORDER — LIDOCAINE HYDROCHLORIDE 10 MG/ML
1 INJECTION, SOLUTION EPIDURAL; INFILTRATION; INTRACAUDAL; PERINEURAL
Status: DISCONTINUED | OUTPATIENT
Start: 2024-10-24 | End: 2024-10-23 | Stop reason: HOSPADM

## 2024-10-23 RX ORDER — ROCURONIUM BROMIDE 10 MG/ML
INJECTION, SOLUTION INTRAVENOUS
Status: DISCONTINUED | OUTPATIENT
Start: 2024-10-23 | End: 2024-10-23 | Stop reason: SDUPTHER

## 2024-10-23 RX ORDER — SCOLOPAMINE TRANSDERMAL SYSTEM 1 MG/1
1 PATCH, EXTENDED RELEASE TRANSDERMAL ONCE
Status: DISCONTINUED | OUTPATIENT
Start: 2024-10-23 | End: 2024-10-23 | Stop reason: HOSPADM

## 2024-10-23 RX ORDER — GABAPENTIN 100 MG/1
100 CAPSULE ORAL 2 TIMES DAILY
Qty: 180 CAPSULE | Refills: 0 | Status: SHIPPED | OUTPATIENT
Start: 2024-10-23 | End: 2025-01-21

## 2024-10-23 RX ORDER — METOCLOPRAMIDE HYDROCHLORIDE 5 MG/ML
10 INJECTION INTRAMUSCULAR; INTRAVENOUS
Status: DISCONTINUED | OUTPATIENT
Start: 2024-10-23 | End: 2024-10-23 | Stop reason: HOSPADM

## 2024-10-23 RX ORDER — SODIUM CHLORIDE, SODIUM LACTATE, POTASSIUM CHLORIDE, CALCIUM CHLORIDE 600; 310; 30; 20 MG/100ML; MG/100ML; MG/100ML; MG/100ML
INJECTION, SOLUTION INTRAVENOUS CONTINUOUS
Status: DISCONTINUED | OUTPATIENT
Start: 2024-10-23 | End: 2024-10-23 | Stop reason: HOSPADM

## 2024-10-23 RX ORDER — ONDANSETRON 4 MG/1
4 TABLET, FILM COATED ORAL DAILY PRN
Qty: 30 TABLET | Refills: 0 | Status: SHIPPED | OUTPATIENT
Start: 2024-10-23

## 2024-10-23 RX ORDER — TIZANIDINE 2 MG/1
2 TABLET ORAL 3 TIMES DAILY PRN
Qty: 30 TABLET | Refills: 0 | Status: CANCELLED | OUTPATIENT
Start: 2024-10-23

## 2024-10-23 RX ORDER — SODIUM CHLORIDE 9 MG/ML
INJECTION, SOLUTION INTRAVENOUS CONTINUOUS
Status: DISCONTINUED | OUTPATIENT
Start: 2024-10-23 | End: 2024-10-23 | Stop reason: HOSPADM

## 2024-10-23 RX ORDER — ONDANSETRON 2 MG/ML
INJECTION INTRAMUSCULAR; INTRAVENOUS
Status: DISCONTINUED | OUTPATIENT
Start: 2024-10-23 | End: 2024-10-23 | Stop reason: SDUPTHER

## 2024-10-23 RX ORDER — HALOPERIDOL 5 MG/ML
1 INJECTION INTRAMUSCULAR
Status: DISCONTINUED | OUTPATIENT
Start: 2024-10-23 | End: 2024-10-23 | Stop reason: HOSPADM

## 2024-10-23 RX ORDER — SODIUM CHLORIDE 0.9 % (FLUSH) 0.9 %
5-40 SYRINGE (ML) INJECTION EVERY 12 HOURS SCHEDULED
Status: DISCONTINUED | OUTPATIENT
Start: 2024-10-23 | End: 2024-10-23 | Stop reason: HOSPADM

## 2024-10-23 RX ORDER — LIDOCAINE HYDROCHLORIDE AND EPINEPHRINE 10; 10 MG/ML; UG/ML
INJECTION, SOLUTION INFILTRATION; PERINEURAL PRN
Status: DISCONTINUED | OUTPATIENT
Start: 2024-10-23 | End: 2024-10-23 | Stop reason: ALTCHOICE

## 2024-10-23 RX ORDER — DEXAMETHASONE SODIUM PHOSPHATE 10 MG/ML
INJECTION, SOLUTION INTRAMUSCULAR; INTRAVENOUS
Status: DISCONTINUED | OUTPATIENT
Start: 2024-10-23 | End: 2024-10-23 | Stop reason: SDUPTHER

## 2024-10-23 RX ORDER — PROMETHAZINE HYDROCHLORIDE 12.5 MG/1
12.5 TABLET ORAL ONCE
Status: COMPLETED | OUTPATIENT
Start: 2024-10-23 | End: 2024-10-23

## 2024-10-23 RX ORDER — OXYCODONE HYDROCHLORIDE 5 MG/1
5 TABLET ORAL
Status: COMPLETED | OUTPATIENT
Start: 2024-10-23 | End: 2024-10-23

## 2024-10-23 RX ORDER — LIDOCAINE HYDROCHLORIDE 20 MG/ML
INJECTION, SOLUTION EPIDURAL; INFILTRATION; INTRACAUDAL; PERINEURAL
Status: DISCONTINUED | OUTPATIENT
Start: 2024-10-23 | End: 2024-10-23 | Stop reason: SDUPTHER

## 2024-10-23 RX ORDER — FENTANYL CITRATE 50 UG/ML
25 INJECTION, SOLUTION INTRAMUSCULAR; INTRAVENOUS EVERY 5 MIN PRN
Status: DISCONTINUED | OUTPATIENT
Start: 2024-10-23 | End: 2024-10-23 | Stop reason: HOSPADM

## 2024-10-23 RX ORDER — MIDAZOLAM HYDROCHLORIDE 1 MG/ML
INJECTION, SOLUTION INTRAMUSCULAR; INTRAVENOUS
Status: DISCONTINUED | OUTPATIENT
Start: 2024-10-23 | End: 2024-10-23 | Stop reason: SDUPTHER

## 2024-10-23 RX ADMIN — ONDANSETRON 4 MG: 2 INJECTION, SOLUTION INTRAMUSCULAR; INTRAVENOUS at 12:30

## 2024-10-23 RX ADMIN — HYDROMORPHONE HYDROCHLORIDE 0.5 MG: 1 INJECTION, SOLUTION INTRAMUSCULAR; INTRAVENOUS; SUBCUTANEOUS at 14:02

## 2024-10-23 RX ADMIN — DEXAMETHASONE SODIUM PHOSPHATE 10 MG: 10 INJECTION, SOLUTION INTRAMUSCULAR; INTRAVENOUS at 10:45

## 2024-10-23 RX ADMIN — Medication 2000 MG: at 10:54

## 2024-10-23 RX ADMIN — OXYCODONE HYDROCHLORIDE 5 MG: 5 TABLET ORAL at 14:40

## 2024-10-23 RX ADMIN — Medication 30 MG: at 10:56

## 2024-10-23 RX ADMIN — PROMETHAZINE HYDROCHLORIDE 12.5 MG: 12.5 TABLET ORAL at 08:44

## 2024-10-23 RX ADMIN — PROPOFOL 75 MCG/KG/MIN: 10 INJECTION, EMULSION INTRAVENOUS at 11:02

## 2024-10-23 RX ADMIN — ACETAMINOPHEN 1000 MG: 500 TABLET ORAL at 08:44

## 2024-10-23 RX ADMIN — Medication 20 MG: at 12:06

## 2024-10-23 RX ADMIN — SODIUM CHLORIDE: 9 INJECTION, SOLUTION INTRAVENOUS at 08:33

## 2024-10-23 RX ADMIN — FENTANYL CITRATE 50 MCG: 50 INJECTION INTRAMUSCULAR; INTRAVENOUS at 10:45

## 2024-10-23 RX ADMIN — ROCURONIUM BROMIDE 50 MG: 10 INJECTION, SOLUTION INTRAVENOUS at 10:45

## 2024-10-23 RX ADMIN — SUGAMMADEX 250 MG: 100 INJECTION, SOLUTION INTRAVENOUS at 12:48

## 2024-10-23 RX ADMIN — PROPOFOL 150 MG: 10 INJECTION, EMULSION INTRAVENOUS at 10:45

## 2024-10-23 RX ADMIN — HYDROMORPHONE HYDROCHLORIDE 0.5 MG: 1 INJECTION, SOLUTION INTRAMUSCULAR; INTRAVENOUS; SUBCUTANEOUS at 14:11

## 2024-10-23 RX ADMIN — LIDOCAINE HYDROCHLORIDE 40 MG: 20 INJECTION, SOLUTION EPIDURAL; INFILTRATION; INTRACAUDAL; PERINEURAL at 10:45

## 2024-10-23 RX ADMIN — FENTANYL CITRATE 50 MCG: 50 INJECTION INTRAMUSCULAR; INTRAVENOUS at 11:12

## 2024-10-23 RX ADMIN — SODIUM CHLORIDE: 9 INJECTION, SOLUTION INTRAVENOUS at 12:39

## 2024-10-23 RX ADMIN — MIDAZOLAM 2 MG: 1 INJECTION INTRAMUSCULAR; INTRAVENOUS at 10:42

## 2024-10-23 RX ADMIN — GLYCOPYRROLATE 0.1 MG: 0.2 INJECTION INTRAMUSCULAR; INTRAVENOUS at 11:06

## 2024-10-23 ASSESSMENT — PAIN SCALES - GENERAL
PAINLEVEL_OUTOF10: 7
PAINLEVEL_OUTOF10: 8
PAINLEVEL_OUTOF10: 3
PAINLEVEL_OUTOF10: 3

## 2024-10-23 ASSESSMENT — PAIN DESCRIPTION - LOCATION
LOCATION: LEG

## 2024-10-23 ASSESSMENT — PAIN DESCRIPTION - FREQUENCY
FREQUENCY: CONTINUOUS

## 2024-10-23 ASSESSMENT — PAIN DESCRIPTION - ORIENTATION
ORIENTATION: RIGHT;LOWER;POSTERIOR

## 2024-10-23 ASSESSMENT — PAIN - FUNCTIONAL ASSESSMENT: PAIN_FUNCTIONAL_ASSESSMENT: 0-10

## 2024-10-23 ASSESSMENT — PAIN DESCRIPTION - DESCRIPTORS
DESCRIPTORS: OTHER (COMMENT)
DESCRIPTORS: OTHER (COMMENT)
DESCRIPTORS: ACHING
DESCRIPTORS: SQUEEZING
DESCRIPTORS: SQUEEZING

## 2024-10-23 ASSESSMENT — PAIN DESCRIPTION - PAIN TYPE
TYPE: SURGICAL PAIN

## 2024-10-23 NOTE — ANESTHESIA POSTPROCEDURE EVALUATION
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    Osito Phase I: Osito Score: 10    Osito Phase II: Osito Score: 10    Anesthesia Post Evaluation    Patient location during evaluation: PACU  Patient participation: complete - patient participated  Level of consciousness: awake  Airway patency: patent  Nausea & Vomiting: no nausea  Cardiovascular status: blood pressure returned to baseline  Respiratory status: acceptable  Hydration status: euvolemic  Comments: Multimodal analgesia pain management as indicated by procedure  Multimodal analgesia pain management approach  Pain management: adequate    No notable events documented.

## 2024-10-23 NOTE — H&P
History and Physical Update    Pt Name: Ginny Hernandez  MRN: [id number]  YOB: 1975  Date of evaluation: 10/23/2024      [x] I have reviewed the Progress notes found in [facility] dated 10/01/24 by Dr. Arnav Padilla which meets the criteria for an Interval History and Physical note and is attached below.  Procedure:CONDUIT REPAIR PERONEAL SURAL NERVE, CONDUIT REPAIR TIBIAL SURAL NERVE, SUTURE OF NERVE, NEUROLYSIS OF SURAL NERVE W ITH END TO END REPAIR   Diagnosis: Saphenous neuralgia, right   [x] I have examined  Ginny Hernandez and there are no changes to the patient or plans. Denies any recent illness fever or chills. Denies the use of blood thinners. Last ate and drank yesterday prior to midnight.       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    Anxiety     Arthritis     right foot    Depression     managed well with medication    Kidney stone         Past Surgical History:     Past Surgical History:   Procedure Laterality Date    ANKLE ARTHROSCOPY Right 2022    RIGHT ANKLE ARTHROSCOPY performed by Justen Pollard MD at [facility] OR    ANKLE SURGERY Right 2022    RIGHT ANKLE LATERAL LIGAMENT  STABILIZATION  WITH GASTROC-  ARTHREX performed by Justen Pollard MD at [facility] OR    ANKLE SURGERY Right 2023    RIGHT LATERAL ANKLE LIGAMENT ALLOGRAFT  RECONSTRUCTION performed by Justen Pollard MD at [facility] OR     SECTION      x 1     FOOT SURGERY Right 2023    RIGHT FIBULA CYST EXCISION WITH BONE GRAFT performed by Justen Pollard MD at [facility] OR    HYSTERECTOMY (CERVIX STATUS UNKNOWN)      KIDNEY SURGERY Left     to relocate the second ureter    LITHOTRIPSY      WISDOM TOOTH EXTRACTION          Social History:     Tobacco:    reports that she quit smoking about 2 years ago. Her smoking use included cigarettes. She has never used smokeless tobacco.  Alcohol:      reports current alcohol use  Allergy   Active   pregabalin Lyrica CR nausea and vomiting Drug Allergy   Active   gabapentin Gabapentin nausea and vomiting Drug Allergy   Active     REASON FOR VISIT    follow up on right ankle  Social History    Tobacco Use:  Social History  Social History Observation Description Date   Details (start date - stop date) Former Smoker NA - NA     Tobacco Control (Standard)  Social History  Question Answer Notes   Tobacco use: Former smoker       Vital Signs    Vital Signs  Weight 260 lbs 10/01/2024   Height 65 in 10/01/2024   Blood pressure systolic 128 mm Hg 10/01/2024   Blood pressure diastolic 72 mm Hg 10/01/2024   Heart Rate 90 /min 10/01/2024   BMI 43.26 kg/m2 10/01/2024     Encounters    Encounters  Encounter Location Date Provider Diagnosis   [facility] [address] [place] [address] 10/01/2024 Arnav Padilla Neuritis of right sural nerve G57.81     Assessments    Assessments  Encounter Date Diagnosis (ICD Code) Assessment Notes Treatment Notes Treatment Clinical Notes   10/01/2024 Neuritis of right sural nerve (ICD-10 - G57.81)     Extensive discussion had with patient regarding the nature and etiology of this condition. Patient's symptoms have been ongoing despite placement of multiple nerve stimulators. She recently had her nerve stimulator removed. Unfortunately she is also dealing with significant financial hardship due to her previously placed nerve stimulators. We discussed both the surgical and non-surgical treatment options as well as all of the benefits and risks of each. It was discussed in detail with the patient that surgical intervention is recommended given progression of symptoms, worsening pain, failure of conservative treatment, failure of stimulator treatment. Given her previous mixed response to injections we suspect mixed innervation along the course of her peroneal and tibial sural nerve requiring both branches to be addressed during surgical

## 2024-10-23 NOTE — OP NOTE
OP NOTE    PATIENT NAME: Ginny Hernandez  YOB: 1975  -  49 y.o. female  MRN: [id number]  DATE: 10/23/2024  BILLING #: [id number]    Surgeon(s):  Arnav Padilla DPM     ASSISTANTS: Timothy Rodriges PGY3    PRE-OP DIAGNOSIS:   Sural neuritis, right lower extremity  Entrapment of sural nerve, right lower extremity    POST-OP DIAGNOSIS: Same as above.    PROCEDURE:   Conduit assisted repair of the peroneal sural nerve, right lower extremity (CPT 87539)  Conduit assisted repair of the tibial sural nerve, right lower extremity (CPT 28268)  Suture and transposition of peroneal sural nerve, right lower extremity (CPT 27825)  Suture and transposition of tibial sural nerve, right lower extremity (CPT 97431)  Suture and transposition of accessory sural nerve, right lower extremity (CPT 30160)    ANESTHESIA: General, 10 cc 1% lidocaine with epinephrine, regional popliteal block, lower extremity given postoperatively    HEMOSTASIS: Direct pressure, electrocautery    ESTIMATED BLOOD LOSS: Roughly 10 cc.    MATERIALS:   Implant Name Type Inv. Item Serial No.  Lot No. LRB No. Used Action   GUIDE NERVE REP MTRX 4 MMX3 CM STRL NEURAGEN 3D LF - [id number]  GUIDE NERVE REP MTRX 4 MMX3 CM STRL NEURAGEN 3D LF  [employer] GERARD-WD [id number] Right 1 Implanted   GUIDE NERVE REP MTRX 4 MMX3 CM STRL NEURAGEN 3D LF - [id number]  GUIDE NERVE REP MTRX 4 MMX3 CM STRL NEURAGEN 3D LF  [employer] GERARD-WD [id number] Right 1 Implanted       INJECTABLES: See above    SPECIMEN: None  * No specimens in log *    COMPLICATIONS: None    FINDINGS: Peroneal sural, tibial sural and accessory sural branch noted. End to end repair of primary sural nerve branches, end-to-side repair of accessory sural nerve branch please see op note for full detail.    INDICATIONS FOR PROCEDURE: The patient is a 49-year-old female with suspected sural neuritis secondary to sural nerve entrapment within lateral ankle and hindfoot of  provided that every grammatical or spelling mistake has been or will be identified or corrected.  In such instances, actual meaning can be extrapolated by context.Thank you for your understanding.

## 2024-10-23 NOTE — ANESTHESIA PRE PROCEDURE
Department of Anesthesiology  Preprocedure Note       Name:  Ginny Hernandez   Age:  49 y.o.  :  1975                                          MRN:  [id number]         Date:  10/23/2024      Surgeon: Surgeon(s):  Arnav Padilla DPM    Procedure: Procedure(s):  CONDUIT REPAIR PERONEAL SURAL NERVE, CONDUIT REPAIR TIBIAL SURAL NERVE, SUTURE OF NERVE, NEUROLYSIS OF SURAL NERVE W ITH END TO END REPAIR    Medications prior to admission:   Prior to Admission medications    Medication Sig Start Date End Date Taking? Authorizing Provider   Handicap Placard MISC by Does not apply route 2022-10/ 7/19/22   Justen Pollard MD   Handicap Placard MISC by Does not apply route 3/10/2022-2022  Patient not taking: Reported on 9/10/2024 3/9/22   Justen Pollard MD       Current medications:    Current Facility-Administered Medications   Medication Dose Route Frequency Provider Last Rate Last Admin   • [START ON 10/24/2024] lidocaine PF 1 % injection 1 mL  1 mL IntraDERmal Once PRN Duane Perkins P, DO       • 0.9 % sodium chloride infusion   IntraVENous Continuous Duane Perkins,  mL/hr at 10/23/24 0833 New Bag at 10/23/24 0833   • lactated ringers IV soln infusion SOLN   IntraVENous Continuous Duane Perkins P, DO       • sodium chloride flush 0.9 % injection 5-40 mL  5-40 mL IntraVENous 2 times per day Duane Perkins P, DO       • sodium chloride flush 0.9 % injection 5-40 mL  5-40 mL IntraVENous PRN Duane Perkins P, DO       • 0.9 % sodium chloride infusion   IntraVENous PRN Duane Perkins P, DO       • scopolamine (TRANSDERM-SCOP) transdermal patch 1 patch  1 patch TransDERmal Once Esdras Winters MD       • promethazine (PHENERGAN) tablet 12.5 mg  12.5 mg Oral Once Esdras Winters MD       • acetaminophen (TYLENOL) tablet 1,000 mg  1,000 mg Oral Once Esdras Winters MD           Allergies:    Allergies   Allergen Reactions   • Duloxetine Hcl Nausea And Vomiting   • Gabapentin Nausea And Vomiting   •

## 2024-10-23 NOTE — DISCHARGE INSTRUCTIONS
Foot and Ankle Surgery Post Operative Instructions:  You have had a surgical procedure on your foot/ ankle      Fluids and Diet:  Begin with clear liquids, broth, dry toast, and crackers.  If not nauseated then resume your regular pre-operative diet when you are ready  With your history of diabetes, please lower your intake sugar content food as this will negatively impact surgery.    Medications:  Take your prescriptions as directed  You are receiving new prescriptions  You received popliteal block (nerve block in the back of knee)- this should manage your pain for the first 18hrs post operatively  If your pain is not severe then you may take the non-prescription medication that you normally take for aches and pains ie Tylenol and Ibuprofen (alternating), or if severe pain occurs these will serve as additional medication   You may resume your regularly scheduled medications (unless otherwise directed)  If you have a fracture or a surgery that involves placing hardware (screws, plates, joint replacement), avoid the routine use of NSAIDs for about 6 months if possible (due to a risk of delayed bone healing).  If any side effects or adverse reactions occur, discontinue the medication and contact your doctor.  Review the patient drug information that is provided before you take any medication    Ambulation and Activity:  You are advised to go directly home from the hospital  Use assistance device with either crutches, walker, or knee scooter  We recommend knee scooter if possible, you can also obtain these on [employer] for rather affordable and quickly obtainable.  You may not put weight on the operated foot.    Avoid stairs.  Do not lift or move heavy objects  Do not drive until cleared by your physician    Bandage and Wound Care Instructions:  Keep bandage clean and dry  Do NOT remove dressing/ splint  DO NOT get wet  Please use shower cover around leg if you do shower so that dressing does not get wet  Do not

## 2024-10-23 NOTE — PROGRESS NOTES
PERMISSION BY PATIENT GIVEN IN PREOP TO TAKE PICTURES OF SURGERY. WITNESSES GIANCARLO SIMMS RN, MICHAEL HERNANDEZ CRNA AND KANE NORWOOD SRNA

## (undated) DEVICE — GARMENT,MEDLINE,DVT,INT,CALF,MED, GEN2: Brand: MEDLINE

## (undated) DEVICE — C-ARM: Brand: UNBRANDED

## (undated) DEVICE — APPLICATOR MEDICATED 26 CC SOLUTION HI LT ORNG CHLORAPREP

## (undated) DEVICE — BANDAGE COMPR W6INXL12FT SMOOTH FOR LIMB EXSANG ESMARCH

## (undated) DEVICE — DRAPE,REIN 53X77,STERILE: Brand: MEDLINE

## (undated) DEVICE — TOWEL,OR,DSP,ST,BLUE,DLX,XR,4/PK,20PK/CS: Brand: MEDLINE

## (undated) DEVICE — SUTURE VCRL SZ 2-0 L27IN ABSRB UD L26MM CT-2 1/2 CIR J269H

## (undated) DEVICE — SUTURE MONOCRYL SZ 3-0 L27IN ABSRB UD L24MM PS-1 3/8 CIR PRIM Y936H

## (undated) DEVICE — GLOVE SURG SZ 85 L12IN FNGR THK79MIL GRN LTX FREE

## (undated) DEVICE — NEEDLE SPNL 18GA L3.5IN W/ QNCKE SHARPER BVL DURA CLICK

## (undated) DEVICE — STRIP WND CLSR W1 4XL4IN POLYAMIDE MACROPOROUS NONWOVEN H2O

## (undated) DEVICE — STAZ LOWER EXTREMITY: Brand: MEDLINE INDUSTRIES, INC.

## (undated) DEVICE — SPONGE LAP W18XL18IN WHT COT 4 PLY FLD STRUNG RADPQ DISP ST 2 PER PACK

## (undated) DEVICE — SKIN PREP TRAY W/CHG: Brand: MEDLINE INDUSTRIES, INC.

## (undated) DEVICE — BNDG,ELSTC,MATRIX,STRL,6"X5YD,LF,HOOK&LP: Brand: MEDLINE

## (undated) DEVICE — [AGGRESSIVE CUTTER, ARTHROSCOPIC SHAVER BLADE,  DO NOT RESTERILIZE,  DO NOT USE IF PACKAGE IS DAMAGED,  KEEP DRY,  KEEP AWAY FROM SUNLIGHT]: Brand: TPS SMALL-JOINT

## (undated) DEVICE — SUTURE ETHILON SZ 8-0 L5IN NONABSORBABLE BLK L6.5MM BV130-5 2808G

## (undated) DEVICE — SUTURE ETHLN SZ 3-0 L18IN NONABSORBABLE BLK PS-2 L19MM 3/8 1669H

## (undated) DEVICE — BNDG,ELSTC,MATRIX,STRL,4"X5YD,LF,HOOK&LP: Brand: MEDLINE

## (undated) DEVICE — NDL CNTR 40CT FM MAG: Brand: MEDLINE INDUSTRIES, INC.

## (undated) DEVICE — BANDAGE COMPR W6INXL5YD WHT BGE POLY COT M E WRP WV HK AND

## (undated) DEVICE — 3M™ STERI-DRAPE™ U-DRAPE 1015: Brand: STERI-DRAPE™

## (undated) DEVICE — TUBING PMP L16FT MAIN DISP FOR AR-6400 AR-6475

## (undated) DEVICE — PADDING,UNDERCAST,COTTON, 4"X4YD STERILE: Brand: MEDLINE

## (undated) DEVICE — SPLINT QUICK STEP SCOTCHCAST 5 X 30

## (undated) DEVICE — SUTURE ETHBND EXCEL SZ 0 L30IN NONABSORBABLE GRN L26MM CT-2 X412H

## (undated) DEVICE — SYRINGE, LUER LOCK, 10ML: Brand: MEDLINE

## (undated) DEVICE — CONTAINER,SPECIMEN,OR STERILE,4OZ: Brand: MEDLINE

## (undated) DEVICE — PADDING UNDERCAST W4INXL12FT RAYON POLY SYN NONADHESIVE

## (undated) DEVICE — SMARTGOWN SURGICAL GOWN, 3XL, LONG: Brand: CONVERTORS

## (undated) DEVICE — PAD,ABDOMINAL,5"X9",ST,LF,25/BX: Brand: MEDLINE INDUSTRIES, INC.

## (undated) DEVICE — GLOVE SURG SZ 8 CRM LTX FREE POLYISOPRENE POLYMER BEAD ANTI

## (undated) DEVICE — GOWN,AURORA,NON-REINFORCED,2XL: Brand: MEDLINE

## (undated) DEVICE — YANKAUER,FLEXIBLE HANDLE,REGLR CAPACITY: Brand: MEDLINE INDUSTRIES, INC.

## (undated) DEVICE — DRAPE,U/ SHT,SPLIT,PLAS,STERIL: Brand: MEDLINE

## (undated) DEVICE — ZIMMER® STERILE DISPOSABLE TOURNIQUET CUFF WITH PLC, DUAL PORT, SINGLE BLADDER, 34 IN. (86 CM)

## (undated) DEVICE — BLANKET WRM W29.9XL79.1IN UP BODY FORC AIR MISTRAL-AIR

## (undated) DEVICE — DRESSING,GAUZE,XEROFORM,CURAD,5"X9",ST: Brand: CURAD

## (undated) DEVICE — CUSHION PRONEVIEW L HD NK FOAM

## (undated) DEVICE — POUCH INSTR W6.75XL11.5IN FRST 2 PKT ADH FOR ORTH AND

## (undated) DEVICE — GLOVE SURG SZ 7 CRM LTX FREE POLYISOPRENE POLYMER BEAD ANTI

## (undated) DEVICE — PADDING CAST W6INXL4YD COT LO LINTING WYTEX

## (undated) DEVICE — SOLUTION IRRIG 500ML 0.9% SOD CHLO USP POUR PLAS BTL

## (undated) DEVICE — KIT INSTR DRL GUID 1.6/1.35MM GUIDWIRE DISP FIBERTAK DX

## (undated) DEVICE — GAUZE,SPONGE,FLUFF,6"X6.75",STRL,5/TRAY: Brand: MEDLINE

## (undated) DEVICE — C-ARMOR C-ARM EQUIPMENT COVERS CLEAR STERILE UNIVERSAL FIT 12 PER CASE: Brand: C-ARMOR

## (undated) DEVICE — [FULL RADIUS CUTTER, ARTHROSCOPIC SHAVER BLADE, ARTHROSCOPIC SHAVER BLADE,  DO NOT RESTERILIZE,  DO NOT USE IF PACKAGE IS DAMAGED,  KEEP DRY,  KEEP AWAY FROM SUNLIGHT]: Brand: TPS SMALL-JOINT

## (undated) DEVICE — INTENDED TO AID IN THE PASSING OF SUTURES THROUGH BONE AND SOFT TISSUE DURING ORTHOPEDIC SURGERY: Brand: HOFFEE SUTURE RETRIEVER

## (undated) DEVICE — SUTURE PROL SZ 3-0 L18IN NONABSORBABLE BLU L19MM FS-2 3/8 8665G

## (undated) DEVICE — SUTURE VCRL SZ 0 L27IN ABSRB UD L26MM CT-2 1/2 CIR J270H

## (undated) DEVICE — BLADE,CARBON-STEEL,15,STRL,DISPOSABLE,TB: Brand: MEDLINE

## (undated) DEVICE — GOWN,SIRUS,NONRNF,SETINSLV,XL,20/CS: Brand: MEDLINE

## (undated) DEVICE — GLOVE SURG SZ 8 L12IN FNGR THK79MIL GRN LTX FREE

## (undated) DEVICE — DRESSING PETRO W3XL8IN OIL EMUL N ADH GZ KNIT IMPREG CELOS

## (undated) DEVICE — GAUZE,SPONGE,4"X4",16PLY,XRAY,STRL,LF: Brand: MEDLINE

## (undated) DEVICE — SUTURE MCRYL SZ 3-0 L27IN ABSRB UD L26MM SH 1/2 CIR Y416H

## (undated) DEVICE — Device

## (undated) DEVICE — STRAP DISTR NYL FOAM PD NONINVASIVE FOR ANK ARTHRO

## (undated) DEVICE — NEEDLE, QUINCKE, 18GX3.5": Brand: MEDLINE

## (undated) DEVICE — SPONGE LAP W18XL18IN WHT COT 4 PLY FLD STRUNG RADPQ DISP ST

## (undated) DEVICE — CORD,CAUTERY,BIPOLAR,STERILE: Brand: MEDLINE

## (undated) DEVICE — TOWEL,OR,DSP,ST,BLUE,STD,4/PK,20PK/CS: Brand: MEDLINE

## (undated) DEVICE — NANOSCOPE HANDPIECE

## (undated) DEVICE — 3M™ STERI-DRAPE™ INCISE DRAPE 1050 (60CM X 45CM): Brand: STERI-DRAPE™

## (undated) DEVICE — DEVICE SIZING GRAFT FIX 0 1.3 MM KT TENODESIS SUTURETAPE W/ NDL

## (undated) DEVICE — SSC BONE WAX: Brand: SSC BONE WAX

## (undated) DEVICE — PADDING UNDERCAST W6INXL4YD RAYON POLY SYN NONADHESIVE

## (undated) DEVICE — ULTRACLEAN ACCESSORY ELECTRODE, 1 INCH COATED NEEDLE WITH EXTENDED INSULATION: Brand: ULTRACLEAN

## (undated) DEVICE — PADDING CAST W6INXL4YD ST COT RAYON MICROPLEATED HIGHLY

## (undated) DEVICE — 4-PORT MANIFOLD: Brand: NEPTUNE 2

## (undated) DEVICE — 3M™ IOBAN™ 2 ANTIMICROBIAL INCISE DRAPE 6650EZ: Brand: IOBAN™ 2

## (undated) DEVICE — SHEATH ARTHROSCOPIC SURG NANOSCOPE

## (undated) DEVICE — SUTURE SUTTAPE L20IN DIA13MM WHT BLU W 76MM STR NDL LOOP AR7534

## (undated) DEVICE — SUTURE MONOCRYL SZ 4-0 L27IN ABSRB UD L19MM PS-2 1/2 CIR PRIM Y426H

## (undated) DEVICE — GLOVE SURG SZ 75 L12IN FNGR THK79MIL GRN LTX FREE

## (undated) DEVICE — NEEDLE HYPO 25GA L1.5IN BLU POLYPR HUB S STL REG BVL STR

## (undated) DEVICE — GOWN,REINFORCE,POLY,SIRUS,XLNG/XLG: Brand: MEDLINE

## (undated) DEVICE — GLOVE SURG SZ 75 CRM LTX FREE POLYISOPRENE POLYMER BEAD ANTI

## (undated) DEVICE — LOOP VES W13MM THK09MM MINI RED SIL FLD REPELLENT

## (undated) DEVICE — GLOVE ORTHO 8   MSG9480

## (undated) DEVICE — INTENDED FOR TISSUE SEPARATION, AND OTHER PROCEDURES THAT REQUIRE A SHARP SURGICAL BLADE TO PUNCTURE OR CUT.: Brand: BARD-PARKER ® CARBON RIB-BACK BLADES

## (undated) DEVICE — 450 ML BOTTLE OF 0.05% CHLORHEXIDINE GLUCONATE IN 99.95% STERILE WATER FOR IRRIGATION, USP AND APPLICATOR.: Brand: IRRISEPT ANTIMICROBIAL WOUND LAVAGE

## (undated) DEVICE — BLADE SHV L7CM DIA2MM SABRE SM HUB COOLCUT